# Patient Record
Sex: FEMALE | Race: WHITE | NOT HISPANIC OR LATINO | Employment: UNEMPLOYED | URBAN - METROPOLITAN AREA
[De-identification: names, ages, dates, MRNs, and addresses within clinical notes are randomized per-mention and may not be internally consistent; named-entity substitution may affect disease eponyms.]

---

## 2017-09-07 DIAGNOSIS — H92.03 EAR PAIN, BILATERAL: Primary | ICD-10-CM

## 2018-10-24 PROBLEM — M79.89 SOFT TISSUE MASS: Status: ACTIVE | Noted: 2018-10-24

## 2018-11-06 ENCOUNTER — HOSPITAL ENCOUNTER (OUTPATIENT)
Dept: RADIOLOGY | Facility: HOSPITAL | Age: 48
Discharge: HOME | End: 2018-11-06
Attending: NURSE PRACTITIONER
Payer: COMMERCIAL

## 2018-11-06 ENCOUNTER — OFFICE VISIT (OUTPATIENT)
Dept: OTOLARYNGOLOGY | Facility: CLINIC | Age: 48
End: 2018-11-06
Payer: COMMERCIAL

## 2018-11-06 VITALS
HEART RATE: 89 BPM | DIASTOLIC BLOOD PRESSURE: 84 MMHG | SYSTOLIC BLOOD PRESSURE: 134 MMHG | WEIGHT: 234.81 LBS | HEIGHT: 67 IN | BODY MASS INDEX: 36.85 KG/M2

## 2018-11-06 DIAGNOSIS — R42 DISEQUILIBRIUM: ICD-10-CM

## 2018-11-06 DIAGNOSIS — J34.89 SINUS PAIN: Primary | ICD-10-CM

## 2018-11-06 DIAGNOSIS — R51.9 SINUS HEADACHE: ICD-10-CM

## 2018-11-06 DIAGNOSIS — J34.89 SINUS PAIN: ICD-10-CM

## 2018-11-06 PROCEDURE — 70220 X-RAY EXAM OF SINUSES: CPT | Mod: 26,,, | Performed by: RADIOLOGY

## 2018-11-06 PROCEDURE — 3008F BODY MASS INDEX DOCD: CPT | Mod: CPTII,S$GLB,, | Performed by: NURSE PRACTITIONER

## 2018-11-06 PROCEDURE — 99214 OFFICE O/P EST MOD 30 MIN: CPT | Mod: S$GLB,,, | Performed by: NURSE PRACTITIONER

## 2018-11-06 PROCEDURE — 70220 X-RAY EXAM OF SINUSES: CPT | Mod: TC,FY,PO

## 2018-11-06 PROCEDURE — 99999 PR PBB SHADOW E&M-EST. PATIENT-LVL III: CPT | Mod: PBBFAC,,, | Performed by: NURSE PRACTITIONER

## 2018-11-06 RX ORDER — ERGOCALCIFEROL 1.25 MG/1
CAPSULE ORAL
Refills: 3 | COMMUNITY
Start: 2018-10-19 | End: 2021-09-01 | Stop reason: SDUPTHER

## 2018-11-06 NOTE — PROGRESS NOTES
"Subjective:       Patient ID: Siobhan Richards is a 48 y.o. female.    Chief Complaint: Headache; sinus pressure; and Dizziness (" lightheaded")    HPI   Patient states her sinuses were flared up last week, better now except for some continued pressure and headaches. She would like sinus x-rays today.   Patient's chief concern at this point is dizziness ongoing for months. When she looks up, she feels suddenly like she is going to fall over, like an overwhelming panic feeling, like she has to hurry up and grab hold of something or she will fall. Sensation is very quick and relieved by bringing her head back down to level with the floor. No spinning but feels like she is falling. She never feels this with laying back in bed or turning over in bed. She feels it with looking down while walking such as navigating stairs. She is frustrated as this makes her feel like she is an 78-year-old. She describes it as a similar feeling to riding escalator or elevator which she has always had difficulty tolerating.     Review of Systems   Constitutional: Negative.    HENT: Positive for congestion (at times), dental problem, postnasal drip (at night) and sinus pain.         Itchy nose  Frequent throat clearing   Eyes: Negative.    Respiratory: Positive for choking (at times).    Cardiovascular: Negative.    Gastrointestinal: Negative.    Musculoskeletal: Negative.    Skin: Negative.    Neurological: Positive for headaches.   Hematological: Negative.    Psychiatric/Behavioral: Negative.        Objective:      Physical Exam   Constitutional: She is oriented to person, place, and time. Vital signs are normal. She appears well-developed and well-nourished. She is cooperative. She does not appear ill. No distress.   HENT:   Head: Normocephalic and atraumatic.   Right Ear: Hearing, tympanic membrane, external ear and ear canal normal. Tympanic membrane is not erythematous. No middle ear effusion.   Left Ear: Hearing, tympanic " membrane, external ear and ear canal normal. Tympanic membrane is not erythematous.  No middle ear effusion.   Nose: Nose normal. No mucosal edema or rhinorrhea. Right sinus exhibits no maxillary sinus tenderness and no frontal sinus tenderness. Left sinus exhibits no maxillary sinus tenderness and no frontal sinus tenderness.   Mouth/Throat: Uvula is midline, oropharynx is clear and moist and mucous membranes are normal. Mucous membranes are not pale, not dry and not cyanotic. No oral lesions. No oropharyngeal exudate, posterior oropharyngeal edema or posterior oropharyngeal erythema.   Eyes: Conjunctivae, EOM and lids are normal. Pupils are equal, round, and reactive to light. Right eye exhibits no discharge. Left eye exhibits no discharge. No scleral icterus.   Neck: Trachea normal and normal range of motion. Neck supple. No tracheal deviation present. No thyroid mass and no thyromegaly present.   Cardiovascular: Normal rate.   Pulmonary/Chest: Effort normal. No stridor. No respiratory distress. She has no wheezes.   Musculoskeletal: Normal range of motion.   Lymphadenopathy:        Head (right side): No submental, no submandibular, no tonsillar, no preauricular and no posterior auricular adenopathy present.        Head (left side): No submental, no submandibular, no tonsillar, no preauricular and no posterior auricular adenopathy present.     She has no cervical adenopathy.        Right cervical: No superficial cervical and no posterior cervical adenopathy present.       Left cervical: No superficial cervical and no posterior cervical adenopathy present.   Neurological: She is alert and oriented to person, place, and time. She has normal strength. Coordination and gait normal.   Skin: Skin is warm, dry and intact. No lesion and no rash noted. She is not diaphoretic. No cyanosis. No pallor.   Psychiatric: She has a normal mood and affect. Her speech is normal and behavior is normal. Judgment and thought content  normal. Cognition and memory are normal.   Nursing note and vitals reviewed.      Assessment:     Sinus pressure/headaches -- imaging needed to rule out sinusitis    Disequilibrium, rule out vestibular etiology  Plan:     Sinus x-rays today -- will notify patient of results as soon as available    Schedule DHP/VNG for vestibular system diagnostic testing.   If sinuses are negative and if vestibular testing is negative, will need referral to neurology.

## 2018-11-06 NOTE — PATIENT INSTRUCTIONS
Ponaris Nasal Emollient is used for the relief of: nasal congestion due to colds, nasal irritation, allergy exacerbations, nasal crusting. Specifically prepared iodized organic oils of pine, eucalyptus, peppermint, cajeput, and cottonseed. To order Ponaris: ask your pharmacist to order it for you or we carry it in our pharmacy downstairs on the first floor.     Dr. Jesusita Vergara for pain, TMJ, headaches.

## 2018-11-09 ENCOUNTER — CLINICAL SUPPORT (OUTPATIENT)
Dept: AUDIOLOGY | Facility: CLINIC | Age: 48
End: 2018-11-09
Payer: COMMERCIAL

## 2018-11-09 DIAGNOSIS — H91.90 HEARING DIFFICULTY, UNSPECIFIED LATERALITY: Primary | ICD-10-CM

## 2018-11-09 DIAGNOSIS — R42 DIZZINESS: Primary | ICD-10-CM

## 2018-11-09 PROCEDURE — 92557 COMPREHENSIVE HEARING TEST: CPT | Mod: S$GLB,,, | Performed by: AUDIOLOGIST-HEARING AID FITTER

## 2018-11-09 PROCEDURE — 92567 TYMPANOMETRY: CPT | Mod: S$GLB,,, | Performed by: AUDIOLOGIST-HEARING AID FITTER

## 2018-11-09 NOTE — PROGRESS NOTES
Siobhan Richards was seen 11/09/2018 for an audiological evaluation. Patient complains of dizziness. 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    Results reveal normal hearing from 250-8000Hz bilaterally.    Speech Reception Thresholds were  5 dBHL for the right ear and 5 dBHL for the left ear.    Word recognition scores were excellent bilaterally.   Tympanograms were Type A for the right ear and Type A for the left ear.    Audiogram results were reviewed in detail with patient and all questions were answered. Results will be reviewed by ENT at the completion of this note. Recommend hearing testing if problems arise and hearing protection when around loud noises.

## 2018-11-15 ENCOUNTER — CLINICAL SUPPORT (OUTPATIENT)
Dept: AUDIOLOGY | Facility: CLINIC | Age: 48
End: 2018-11-15
Payer: COMMERCIAL

## 2018-11-15 DIAGNOSIS — H81.4 CENTRAL VESTIBULAR VERTIGO: Primary | ICD-10-CM

## 2018-11-15 PROCEDURE — 92540 BASIC VESTIBULAR EVALUATION: CPT | Mod: S$GLB,,, | Performed by: AUDIOLOGIST

## 2018-11-15 PROCEDURE — 92537 CALORIC VSTBLR TEST W/REC: CPT | Mod: S$GLB,,, | Performed by: AUDIOLOGIST

## 2018-11-15 NOTE — PROGRESS NOTES
Referring provider: Macey Drake NP    Siobhan Richards was seen 11/15/2018 for Videonystagmography (VNG) testing.      Dizzy symptoms mostly consist of lightheadedness and disorientation.  It is worse when the lights are out or dim.  She feels this especially when she bends down and then comes up so she has to hold on to something to steady herself. This feeling lasts a few seconds and is better when her head is level to the floor.  She feels like she is riding in an elevator or on an escalator even when she is standing still when this feeling occurs, almost like she is falling or in motion.  She denies true vertigo and does not get dizzy with laying down in bed or rolling over in bed.     She does have a history of migraine headaches and has a Vitamin D deficiency.  Her migraine triggers are smells, weather changes, hormone changes and some foods (i.e., [place] semi-sweet chocolate, etc.) and alcohol.  She takes Topamax at times or Advil, Excedrin or other OTC medications. she also uses peppermint to help. She does not currently see a neurologist for her migraines. No vestibular suppressants, alcohol or caffeine within the past 24 hours. Pt had a normal audiogram on 11/9/18 and has bilateral tinnitus.      VNG Results:   Note: Vertical nystagmus observed during calibration with vision up stimulus  Oculomotor function tests (sinusoidal tracking, saccade and OPKs) were within normal limits and symmetric.   NOTE: Significant catch-up saccades and square wave jerk nystagmus were observed in sinusoidal tracking and saccades test which are abnormal and indicative of central involvement.   Spontaneous nystagmus test revealed square wave jerk nystagmus.   Gaze nystagmus was absent.   Head-shake test revealed square wave jerk nystagmus.   Abdulaziz-Hallpike Left was negative for BPPV.  Abdulaziz-Hallpike Right was negative for BPPV.  Static Positional nystagmus was absent.   Supine: WNL   Head Right: 2 d/s RB nystagmus (<6  d/s is clinically insignificant)    Head Left: WNL   Body Right: WNL    Body Left: WNL   Bi-thermal caloric irrigations revealed a 18% caloric weakness in the left ear, which is within normal limits, and 2% directional preponderance to the left, which is within normal limits.  Fixation suppression following caloric irrigations were normal.   RC: 12 d/s   RW: 17 d/s     d/s   LW: 13 d/s     Impressions:   Normal peripheral function with indication of central involvement.  Abnormal findings were:    1. Square wave jerk nystagmus and significant catch-up saccades during oculomotor testing   2. Vertical nystagmus observed during calibration with vision up stimulus    Recommendations:   1. ENT review of results   2. Neurology consult    Tracings will be scanned into computer.

## 2018-11-16 DIAGNOSIS — H81.4 VERTIGO OF CENTRAL ORIGIN, UNSPECIFIED LATERALITY: Primary | ICD-10-CM

## 2019-02-28 ENCOUNTER — TELEPHONE (OUTPATIENT)
Dept: NEUROLOGY | Facility: CLINIC | Age: 49
End: 2019-02-28

## 2019-02-28 NOTE — TELEPHONE ENCOUNTER
----- Message from Melania Wilcox sent at 2/28/2019  2:28 PM CST -----  Contact: self  Type: Needs Medical Advice    Who Called:  self  Symptoms (please be specific):    How long has patient had these symptoms:    Pharmacy name and phone #:    Best Call Back Number: [phone]  Additional Information: patient would like to know the cost of the Botox for her appointment tomorrow. Please call patient. Thanks!

## 2019-02-28 NOTE — TELEPHONE ENCOUNTER
Returned call and spoke with patient. Patient would like to know how much is the cost of her consult tomorrow. Patient has insurance. Informed patient that I am unable to tell her the cost. Patient would need to speak with the billing department and/or patient access representative. Patient verbalized understanding, but declined number.

## 2019-03-01 ENCOUNTER — OFFICE VISIT (OUTPATIENT)
Dept: NEUROLOGY | Facility: CLINIC | Age: 49
End: 2019-03-01
Payer: COMMERCIAL

## 2019-03-01 ENCOUNTER — HOSPITAL ENCOUNTER (OUTPATIENT)
Dept: RADIOLOGY | Facility: HOSPITAL | Age: 49
Discharge: HOME | End: 2019-03-01
Attending: PSYCHIATRY & NEUROLOGY
Payer: COMMERCIAL

## 2019-03-01 VITALS
BODY MASS INDEX: 35.88 KG/M2 | HEART RATE: 78 BPM | SYSTOLIC BLOOD PRESSURE: 124 MMHG | WEIGHT: 228.63 LBS | DIASTOLIC BLOOD PRESSURE: 79 MMHG | RESPIRATION RATE: 16 BRPM | HEIGHT: 67 IN

## 2019-03-01 DIAGNOSIS — M54.2 CERVICALGIA: ICD-10-CM

## 2019-03-01 DIAGNOSIS — G43.719 INTRACTABLE CHRONIC MIGRAINE WITHOUT AURA AND WITHOUT STATUS MIGRAINOSUS: ICD-10-CM

## 2019-03-01 PROCEDURE — 3008F BODY MASS INDEX DOCD: CPT | Mod: CPTII,S$GLB,, | Performed by: PSYCHIATRY & NEUROLOGY

## 2019-03-01 PROCEDURE — 72052 X-RAY EXAM NECK SPINE 6/>VWS: CPT | Mod: 26,,, | Performed by: RADIOLOGY

## 2019-03-01 PROCEDURE — 99205 OFFICE O/P NEW HI 60 MIN: CPT | Mod: S$GLB,,, | Performed by: PSYCHIATRY & NEUROLOGY

## 2019-03-01 PROCEDURE — 99999 PR PBB SHADOW E&M-EST. PATIENT-LVL III: ICD-10-PCS | Mod: PBBFAC,,, | Performed by: PSYCHIATRY & NEUROLOGY

## 2019-03-01 PROCEDURE — 99205 PR OFFICE/OUTPT VISIT, NEW, LEVL V, 60-74 MIN: ICD-10-PCS | Mod: S$GLB,,, | Performed by: PSYCHIATRY & NEUROLOGY

## 2019-03-01 PROCEDURE — 99999 PR PBB SHADOW E&M-EST. PATIENT-LVL III: CPT | Mod: PBBFAC,,, | Performed by: PSYCHIATRY & NEUROLOGY

## 2019-03-01 PROCEDURE — 72052 X-RAY EXAM NECK SPINE 6/>VWS: CPT | Mod: TC,FY,PO

## 2019-03-01 PROCEDURE — 72052 XR CERVICAL SPINE 5 VIEW WITH FLEX AND EXT: ICD-10-PCS | Mod: 26,,, | Performed by: RADIOLOGY

## 2019-03-01 PROCEDURE — 3008F PR BODY MASS INDEX (BMI) DOCUMENTED: ICD-10-PCS | Mod: CPTII,S$GLB,, | Performed by: PSYCHIATRY & NEUROLOGY

## 2019-03-01 RX ORDER — PROCHLORPERAZINE MALEATE 10 MG
10 TABLET ORAL EVERY 6 HOURS PRN
Qty: 60 TABLET | Refills: 11 | Status: SHIPPED | OUTPATIENT
Start: 2019-03-01 | End: 2021-03-23

## 2019-03-01 RX ORDER — SUMATRIPTAN SUCCINATE 4 MG/.5ML
4 INJECTION, SOLUTION SUBCUTANEOUS
Qty: 2 ML | Refills: 11 | Status: SHIPPED | OUTPATIENT
Start: 2019-03-01 | End: 2021-03-23

## 2019-03-01 RX ORDER — FUROSEMIDE 20 MG/1
TABLET ORAL
Refills: 12 | COMMUNITY
Start: 2019-01-21

## 2019-03-01 RX ORDER — VENLAFAXINE HYDROCHLORIDE 150 MG/1
150 CAPSULE, EXTENDED RELEASE ORAL DAILY
Qty: 30 CAPSULE | Refills: 11 | Status: SHIPPED | OUTPATIENT
Start: 2019-03-01 | End: 2021-03-23

## 2019-03-01 RX ORDER — RIZATRIPTAN BENZOATE 10 MG/1
TABLET ORAL
Qty: 18 TABLET | Refills: 11 | Status: SHIPPED | OUTPATIENT
Start: 2019-03-01 | End: 2021-03-23

## 2019-03-01 RX ORDER — VENLAFAXINE HYDROCHLORIDE 37.5 MG/1
CAPSULE, EXTENDED RELEASE ORAL
Qty: 21 CAPSULE | Refills: 0 | Status: SHIPPED | OUTPATIENT
Start: 2019-03-01 | End: 2021-03-23

## 2019-03-01 NOTE — PATIENT INSTRUCTIONS
TESTING:  -- cervical x ray     REFERRALS:  -- none     PREVENTION (use daily regardless of headache):  -- begin venlafaxine XR 37.5mg in the AM and raise according to the chart   -- next line would be Emgality (monthly injection) or Botox     AS-NEEDED TREATMENT (use total no more than 10 days per month unless otherwise stated):  -- for very fast onset migraines, take Imitrex shot, may repeat once in 1 hour  -- for all other headaches including sinus symptoms, take rizatriptan (maxalt) at onset, may repeat every 2 hours for max 3 in one day   -- may not use imitrex and maxalt in the same 24 hour period (if scalp tender, then take it with aleve or ibuprofen)  -- for headaches you are unsure on, take compazine (nausea pill that works for headaches) - may use daily       ------------------------------------------------------------------------------------------------------------------------------------------------------------------------------      Venlafaxine Long-Acting (Effexor XR) for Headaches    Venlafaxine is an antidepressant medication of the SNRI family (as opposed to the more commonly known SSRIs). It is a useful migraine preventive, although it is not FDA-approved for this purpose (it is approved for anxiety, depression, panic, and social phobia). The drug may also be helpful in certain other painful conditions. It is available as an inexpensive short-acting generic, but we do not recommend this formulation, because missing a single dose can sometimes cause unpleasant withdrawal symptoms. We therefore prescribe the long-acting capsules or tablets.    The drug should be taken in the morning, to reduce the chance of insomnia.  It is often better tolerated than the older tricyclic antidepressants (TCAs, including nortriptyline, amitriptyline, imipramine, and doxepin), without much in the way of weight-gain or sedation. Severe side effects are uncommon, but milder and more frequent ones  include:   Constipation   Dry mouth   Loss of appetite / weight loss / GI upset   Insomnia   Tremor   Increased blood pressure   Sweating and hot flashes   Sexual side effects   Yawning   Teeth grinding   Dizziness   It can sometimes cause a nagging headache   It can sometimes worsen anxiety   Suicidal thoughts (rare; a problem with all antidepressants)    There is a theoretical interaction of venlafaxine (and most other antidepressants) with the prescription migraine attack medications known as triptans. This interaction--called the serotonin syndrome--occurs extremely rarely, if at all (see separate handout) and ranges from barely noticeable to quite serious. Symptoms include fever, racing heart, tremulousness, sweating, twitching, agitation and confusion.     The drug is increased gradually, with most patients eventually reaching 150mg (some do well on 75mg, others require higher doses, up to 300mg). If you feel that 150mg is too strong, contact us to drop back down to 75mg.    Venlafaxine Long-Acting (Effexor XR)   Week # Morning Dose   1 37.5mg   2 37.5mg + 37.5mg = 75mg   3 and after 150mg     Like all headache prevention drugs, once a good dose is reached it can take several weeks before an effect is noticed. Benefits can increase over time. Stick with it as long as the side effects are tolerable.    If you need to stop the medicine and you have been taking 150mg or more we typically recommend gradually decreasing the drug rather than stopping cold turkey to avoid common and unpleasant withdrawal symptoms, including zapping feelings and dizziness.

## 2019-03-01 NOTE — PROGRESS NOTES
"Date of service: 3/1/2019  Referring provider: Macey Drake    Subjective:      Chief complaint: Migraine       Patient ID: Siobhan Richards is a 48 y.o. lady referred for the evaluation of migraine     History of Present Illness    ORIGINAL HEADACHE HISTORY - 3/1/19  Age at onset and course over time: headaches since childhood, worse with puberty, ovulation, and childbirth. She has 3 different headache types. One is triggered by smell and is acute, unilateral, with nausea and vomiting, resolves after a couple of hours (very quick progression). 2nd is one that occurs upon waking, from occipital to eye, lastrs one day, photo and nausea and vomiting but not always. She associated this with a lipoma that she had removed. This occurs 2-3 times per month. Third headache is a "sinus headache" that occurs with weather changes, sometimes escalates to the 2nd type, this is the most frequent type. She was referred by ENT. She also has dizziness but this is intermittent. She feels off balance. Gets dizzy when looking up 9 (in [employer]), sec to minutes. No other types of dizziness.     She is pain free 10 days a month    Diagnosed with erosive gastritis 12/18/18     Location: unilateral eye, neck, left more than right   Quality:  [x] pressure [] tight [x] throbbing [] sharp [] stabbing   Severity: 1 to 10  Duration: hours to days, lately longer   Frequency: 15 to 20 headache days per month and rapid 2-3 escalations per month    Headaches awaken at night?: 2 per month   Worst time of day:  Depends on the headache   Associated with: [x] photophobia [x]  phonophobia [x] osmophobia [x] blurred vision  [x] double vision [] loss of appetite [x] nausea [x] vomiting [x] dizziness [x] vertigo  [] tinnitus [] irritability [x] sinus pressure [] problems with concentration   [x] neck tightness   Alleviated by:  [x] sleep [x] darkness [x] massage [] heat [] ice [x] medication  Exacerbated by:  [x] fatigue [x] light [] noise [x] " smells [] coughing [] sneezing  [] bending over [] ovulation [] menses [] alcohol [] change in weather []  stress  Ipsilateral autonomic: [] nasal congestion [] lacrimation [] ptosis [] injection [] edema [] foreign body sensation [] ear fullness   ICP:   Sleep habits: falls asleep OK but has a hard time staying asleep (teenager related)   Caffeine intake: 1-2   Gyn status (if female):     Current acute treatment - previously 3 days per week; now approximately once a week   -- advil   -- excedrin  -- sudafed  -- massager     Current prevention:  -- topiramate 50mg daily since 2016 - has lead to some improvement (100mg made her foggy), paresthesias   (vit D)     Previously tried/failed acute treatment - would take too late   -- sumatriptan  -- zomig     Previously tried/failed preventative treatment:  -- none     Review of patient's allergies indicates:  No Known Allergies  Current Outpatient Medications   Medication Sig Dispense Refill    aspirin-acetaminophen-caffeine 250-250-65 mg (EXCEDRIN MIGRAINE) 250-250-65 mg per tablet Excedrin Migraine   prn      furosemide (LASIX) 20 MG tablet TAKE 1 TABLET ONCE A DAY AS NEEDED SWELLING  12    pantoprazole (PROTONIX) 40 MG tablet Take 40 mg by mouth once daily.  11    phentermine (ADIPEX-P) 37.5 mg tablet Take 37.5 mg by mouth once daily.   0    pravastatin (PRAVACHOL) 20 MG tablet Take 40 mg by mouth once daily.   3    topiramate (TOPAMAX) 50 MG tablet Take 50 mg by mouth once daily.  3    VITAMIN D2 50,000 unit capsule TAKE ONE CAPSULE BY MOUTH ONE TIME PER WEEK  3    prochlorperazine (COMPAZINE) 10 MG tablet Take 1 tablet (10 mg total) by mouth every 6 (six) hours as needed (migraine or nausea). 60 tablet 11    rizatriptan (MAXALT) 10 MG tablet 1 tab PO PRN migraine. May repeat every 2 hours for max 3 tabs in 24 hours. Use no more than 10 days per month. 18 tablet 11    SUMAtriptan succinate (IMITREX) 4 mg/0.5 mL PnIj Inject 4 mg into the skin every hour as  needed (severe migraine.). No more than 2 in one day, 2 days per week 2 mL 11    venlafaxine (EFFEXOR-XR) 150 MG Cp24 Take 1 capsule (150 mg total) by mouth once daily. 30 capsule 11    venlafaxine (EFFEXOR-XR) 37.5 MG 24 hr capsule 1 tab PO in AM x 1 week, then 2 tab PO in AM x 1 week 21 capsule 0     No current facility-administered medications for this visit.        Past Medical History  Past Medical History:   Diagnosis Date    Allergy     Arthritis     Dizziness     Headache(784.0)     Hyperlipidemia     Obesity     PONV (postoperative nausea and vomiting)     Rash     Sinusitis     Snoring        Past Surgical History  Past Surgical History:   Procedure Laterality Date    BREAST BIOPSY Right 2009    core bx.    BREAST BIOPSY Right 2009    removal of duct     SECTION      CHOLECYSTECTOMY      EXCISION, LIPOMA - Left Scapula Left 10/24/2018    Performed by Smith [facility]yon [facility]       Family History  Family History   Problem Relation Age of Onset    Heart disease Mother     Lung cancer Father     Thyroid cancer Sister     Heart attack Brother        Social History  Social History     Socioeconomic History    Marital status:      Spouse name: Not on file    Number of children: Not on file    Years of education: Not on file    Highest education level: Not on file   Social Needs    Financial resource strain: Not on file    Food insecurity - worry: Not on file    Food insecurity - inability: Not on file    Transportation needs - medical: Not on file    Transportation needs - non-medical: Not on file   Occupational History    Not on file   Tobacco Use    Smoking status: Former Smoker    Smokeless tobacco: Never Used   Substance and Sexual Activity    Alcohol use: No    Drug use: No    Sexual activity: Not on file   Other Topics Concern    Not on file   Social History Narrative    Not on file        Review of Systems  14-point review of systems as  follows:   No check darren indicates NEGATIVE response   Constitutional: [] weight loss, [] change to appetite   Eyes: [] change in vision, [] double vision   Ears, nose, mouth, throat: [] frequent nose bleeds, [x] ringing in the ears   Respiratory: [] cough, [] wheezing   Cardiovascular: [] chest pain, [] palpitations   Gastrointestinal: [] jaundice, [] nausea/vomiting   Genitourinary: [] incontinence, [] burning with urination   Hematologic/lymphatic: [] easy bruising/bleeding, [] night sweats   Neurological: [x] numbness, [x] weakness   Endocrine: [x] fatigue, [] heat/cold intolerance   Allergy/Immunologic: [] fevers, [] chills   Musculoskeletal: [] muscle pain, [x] joint pain   Psychiatric: [] thoughts of harming self/others, [] depression   Integumentary: [] rashes, [] sores that do not heal     Objective:        Vitals:    03/01/19 0801   BP: 124/79   Pulse: 78   Resp: 16     Body mass index is 35.81 kg/m².    Constitutional: appears in no acute distress, well-developed, well-nourished     Eyes: normal conjunctiva, PERRLA, optic discs are flat and sharp bilaterally     Ears, nose, mouth, throat: external appearance of ears and nose normal, hearing intact     Cardiovascular: regular rate and rhythm, no murmurs appreciated    Respiratory: unlabored respirations, breath sounds normal bilaterally    Gastrointestinal: no visible abdominal masses, no guarding, no visible hernia    Musculoskeletal: normal tone in all four extremities. No atrophy. No abnormal movements. No pronator drift. No orbit. Symmetric finger tapping. Normal gait and station. Digits and nails normal.      Spine:   CERVICAL SPINE:  ROM: normal   MUSCLE SPASM: mild bilateral   FACET LOADING: bilateral   SPURLING: no  PERRI / LEIGHTON tender: no     Psychiatric: normal judgment and insight. Oriented to person, place, and time.     Neurologic:   Cortical functions: recent and remote memory intact, normal attention span and concentration, speech fluent,  adequate fund of knowledge   Cranial nerves: visual fields full, PERRLA, EOMI, symmetric facial strength, hearing intact, palate elevates symmetrically, shoulder shrug 5/5, tongue protrudes midline   Reflexes: 2+ in the upper and lower extremities, no Jorge  Sensation: intact to temperature throughout   Coordination: normal finger to nose    Data Review:     I have personally reviewed the referring provider's notes, labs, & imaging made available to me today.      RADIOLOGY STUDIES:   I have personally reviewed the pertinent images performed.     Patient has a history of normal brain MRI    No results found for this or any previous visit.    Lab Results   Component Value Date    HGBA1C 5.1 10/24/2018       No results found for: WBC, HGB, HCT, MCV, PLT    No results found for: TSH        Assessment & Plan:       Problem List Items Addressed This Visit        Neuro    Intractable chronic migraine without aura and without status migrainosus    Overview     Strong family history of migraine onset in childhood with expected flares during typical hormonal milestones.  Gradually progressive to chronic migraine over time.  Very typical symptoms of weather sensitivity, osmophobia, nausea, light and noise sensitivity.  We discussed that all three headache types including the sinus headaches are actually migrainous in nature.  There was previously a tendency towards medication overuse with nonsteroidals but due to the development of erosive gastritis she has stopped overuse.  Triptans never worked because they were taken too late.    At this point she is only on topiramate prevention which is largely ineffective.  Higher doses were not tolerated due to mental fog.  I advised either beginning venlafaxine which is the only weight neutral option we have left versus a CGRP antagonist.  At this point we elected to start venlafaxine.  If this is failed week and then pursue Emgality or Botox depending on her choice.    As  occasionally she will have a headache with very rapid escalation upon waking up we will need to do injectable Imitrex.  On all other moderate days I would like her to treat with rizatriptan 1st to have a migraine specific therapy.  I have also provided Compazine to take early when she is not sure if migraines will escalate or not.         Relevant Medications    venlafaxine (EFFEXOR-XR) 37.5 MG 24 hr capsule    venlafaxine (EFFEXOR-XR) 150 MG Cp24    rizatriptan (MAXALT) 10 MG tablet    prochlorperazine (COMPAZINE) 10 MG tablet    SUMAtriptan succinate (IMITREX) 4 mg/0.5 mL PnIj       Orthopedic    Cervicalgia    Overview     Left more than right axial neck pain especially overnight.  Facet loading on exam.  Obtain x-ray.  I think bulk of her neck pain will improve if her migraines are better controlled but we may need to consider trigger points or CMBB in the future         Relevant Orders    X-Ray Cervical Spine 5 View W Flex Extxt              TESTING:  -- cervical x ray     REFERRALS:  -- none     PREVENTION (use daily regardless of headache):  -- begin venlafaxine XR 37.5mg in the AM and raise according to the chart   -- next line would be Emgality (monthly injection) or Botox     AS-NEEDED TREATMENT (use total no more than 10 days per month unless otherwise stated):  -- for very fast onset migraines, take Imitrex shot, may repeat once in 1 hour  -- for all other headaches including sinus symptoms, take rizatriptan (maxalt) at onset, may repeat every 2 hours for max 3 in one day   -- may not use imitrex and maxalt in the same 24 hour period (if scalp tender, then take it with aleve or ibuprofen)  -- for headaches you are unsure on, take compazine (nausea pill that works for headaches) - may use daily     Follow-up in about 2 months (around 5/1/2019).     A total of 60 minutes were spent face to face with the patient and more than half of this time was spent coordinating care and/or counseling.       Jesusita CORMIER  MD Urs[place]

## 2019-03-01 NOTE — LETTER
March 1, 2019      Macey Drake, ALEJANDRA  [address] [facility] [address] [place] [address]           [facility] [place]  [address] [facility] [address] [place] [address]  Phone: [phone]  Fax: [phone]          Patient: Siobhan Richards   MR Number: [id number]   YOB: 1970   Date of Visit: 3/1/2019       Dear Macey Drake:    Thank you for referring Siobhan Richards to me for evaluation. Attached you will find relevant portions of my assessment and plan of care.    If you have questions, please do not hesitate to call me. I look forward to following Siobhan Richards along with you.    Sincerely,    Jesusita Vergara MD    Enclosure  CC:  No Recipients    If you would like to receive this communication electronically, please contact [email or web address] or [phone] to request more information on [employer] Link access.    For providers and/or their staff who would like to refer a patient to [facility], please contact us through our one-stop-shop provider referral line, [facility], at [phone].    If you feel you have received this communication in error or would no longer like to receive these types of communications, please e-mail [email or web address]

## 2019-04-23 PROBLEM — S82.851A CLOSED TRIMALLEOLAR FRACTURE OF RIGHT ANKLE: Status: ACTIVE | Noted: 2019-04-23

## 2019-05-08 ENCOUNTER — TELEPHONE (OUTPATIENT)
Dept: NEUROLOGY | Facility: CLINIC | Age: 49
End: 2019-05-08

## 2019-05-08 NOTE — TELEPHONE ENCOUNTER
----- Message from Carole Malagon sent at 5/8/2019  2:30 PM CDT -----  Contact: Patient  Type: Needs Medical Advice    Who Called:  Patient  Symptoms (please be specific):  na  How long has patient had these symptoms:  harman  Pharmacy name and phone #:  [facility]  Best Call Back Number: [phone] (home)    Additional Information: Patient states that she cancelled her appointment on 5/7/19, states that she would like to have the missed appointment removed and replaced with cancelled. Requesting a call to discuss, would like to verify that there is no additional charge. Please call to advise, thank you!

## 2019-05-08 NOTE — TELEPHONE ENCOUNTER
Returned call and spoke with patient. All questions and concerns addressed. Patient verbalized understanding and will call back once her foot in healed.

## 2019-10-03 ENCOUNTER — TELEPHONE (OUTPATIENT)
Dept: OTOLARYNGOLOGY | Facility: CLINIC | Age: 49
End: 2019-10-03

## 2019-10-03 NOTE — TELEPHONE ENCOUNTER
----- Message from Helena Chapman sent at 10/3/2019 10:53 AM CDT -----  Contact: [phone]  Patient requesting same day appointment due to left ear pain.   Please call patient at [phone].   Thanks!

## 2019-10-03 NOTE — TELEPHONE ENCOUNTER
S/w pt that states she is having ear and jaw pain, would like to be seen today. Advised pt Macey has nothing available until 10/14/19. Booked appt, placed pt on wait list. Pt confirmed.

## 2019-11-13 LAB — NONINV COLON CA DNA+OCC BLD SCRN STL QL: NEGATIVE

## 2020-11-16 LAB
HUMAN PAPILLOMAVIRUS (HPV): NORMAL
PAP SMEAR: NORMAL

## 2021-03-23 ENCOUNTER — OFFICE VISIT (OUTPATIENT)
Dept: FAMILY MEDICINE | Facility: CLINIC | Age: 51
End: 2021-03-23
Payer: COMMERCIAL

## 2021-03-23 ENCOUNTER — TELEPHONE (OUTPATIENT)
Dept: FAMILY MEDICINE | Facility: CLINIC | Age: 51
End: 2021-03-23

## 2021-03-23 VITALS
OXYGEN SATURATION: 96 % | WEIGHT: 240 LBS | HEART RATE: 90 BPM | BODY MASS INDEX: 37.67 KG/M2 | HEIGHT: 67 IN | SYSTOLIC BLOOD PRESSURE: 118 MMHG | DIASTOLIC BLOOD PRESSURE: 84 MMHG | TEMPERATURE: 98 F

## 2021-03-23 DIAGNOSIS — Z00.00 WELLNESS EXAMINATION: ICD-10-CM

## 2021-03-23 DIAGNOSIS — E78.2 MIXED HYPERLIPIDEMIA: Primary | ICD-10-CM

## 2021-03-23 DIAGNOSIS — M83.9 VITAMIN D DEFICIENT OSTEOMALACIA: ICD-10-CM

## 2021-03-23 DIAGNOSIS — E66.9 OBESITY (BMI 30-39.9): ICD-10-CM

## 2021-03-23 DIAGNOSIS — E88.810 METABOLIC SYNDROME: ICD-10-CM

## 2021-03-23 DIAGNOSIS — F98.8 ADD (ATTENTION DEFICIT DISORDER) WITHOUT HYPERACTIVITY: ICD-10-CM

## 2021-03-23 DIAGNOSIS — G43.909 MIGRAINE WITHOUT STATUS MIGRAINOSUS, NOT INTRACTABLE, UNSPECIFIED MIGRAINE TYPE: ICD-10-CM

## 2021-03-23 PROCEDURE — 99999 PR PBB SHADOW E&M-EST. PATIENT-LVL III: ICD-10-PCS | Mod: PBBFAC,,, | Performed by: INTERNAL MEDICINE

## 2021-03-23 PROCEDURE — 99999 PR PBB SHADOW E&M-EST. PATIENT-LVL III: CPT | Mod: PBBFAC,,, | Performed by: INTERNAL MEDICINE

## 2021-03-23 PROCEDURE — 99214 PR OFFICE/OUTPT VISIT, EST, LEVL IV, 30-39 MIN: ICD-10-PCS | Mod: S$GLB,,, | Performed by: INTERNAL MEDICINE

## 2021-03-23 PROCEDURE — 99214 OFFICE O/P EST MOD 30 MIN: CPT | Mod: S$GLB,,, | Performed by: INTERNAL MEDICINE

## 2021-03-23 PROCEDURE — 3008F BODY MASS INDEX DOCD: CPT | Mod: CPTII,S$GLB,, | Performed by: INTERNAL MEDICINE

## 2021-03-23 PROCEDURE — 3008F PR BODY MASS INDEX (BMI) DOCUMENTED: ICD-10-PCS | Mod: CPTII,S$GLB,, | Performed by: INTERNAL MEDICINE

## 2021-03-23 RX ORDER — TOPIRAMATE 50 MG/1
50 TABLET, FILM COATED ORAL DAILY
Qty: 90 TABLET | Refills: 2 | Status: SHIPPED | OUTPATIENT
Start: 2021-03-23 | End: 2021-06-17 | Stop reason: SDUPTHER

## 2021-03-23 RX ORDER — CYCLOBENZAPRINE HCL 10 MG
10 TABLET ORAL 3 TIMES DAILY PRN
Qty: 90 TABLET | Refills: 0 | Status: SHIPPED | OUTPATIENT
Start: 2021-03-23 | End: 2021-04-02

## 2021-03-23 RX ORDER — PHENTERMINE HYDROCHLORIDE 37.5 MG/1
37.5 TABLET ORAL DAILY
Qty: 30 TABLET | Refills: 0 | Status: SHIPPED | OUTPATIENT
Start: 2021-03-23 | End: 2021-06-17 | Stop reason: SDUPTHER

## 2021-03-23 RX ORDER — ACETAMINOPHEN 160 MG/5ML
SUSPENSION, ORAL (FINAL DOSE FORM) ORAL
COMMUNITY
Start: 2020-12-01

## 2021-03-23 RX ORDER — PANTOPRAZOLE SODIUM 40 MG/1
40 TABLET, DELAYED RELEASE ORAL DAILY
Qty: 90 TABLET | Refills: 3 | Status: SHIPPED | OUTPATIENT
Start: 2021-03-23 | End: 2022-06-26

## 2021-03-30 DIAGNOSIS — Z12.11 COLON CANCER SCREENING: ICD-10-CM

## 2021-04-06 ENCOUNTER — PATIENT MESSAGE (OUTPATIENT)
Dept: ADMINISTRATIVE | Facility: HOSPITAL | Age: 51
End: 2021-04-06

## 2021-04-16 ENCOUNTER — PATIENT OUTREACH (OUTPATIENT)
Dept: ADMINISTRATIVE | Facility: HOSPITAL | Age: 51
End: 2021-04-16

## 2021-04-16 ENCOUNTER — TELEPHONE (OUTPATIENT)
Dept: FAMILY MEDICINE | Facility: CLINIC | Age: 51
End: 2021-04-16

## 2021-04-16 DIAGNOSIS — E78.2 MIXED HYPERLIPIDEMIA: ICD-10-CM

## 2021-04-16 DIAGNOSIS — M83.9 VITAMIN D DEFICIENT OSTEOMALACIA: Primary | ICD-10-CM

## 2021-04-16 DIAGNOSIS — Z00.00 WELLNESS EXAMINATION: ICD-10-CM

## 2021-05-06 ENCOUNTER — PATIENT MESSAGE (OUTPATIENT)
Dept: RESEARCH | Facility: HOSPITAL | Age: 51
End: 2021-05-06

## 2021-06-17 ENCOUNTER — OFFICE VISIT (OUTPATIENT)
Dept: FAMILY MEDICINE | Facility: CLINIC | Age: 51
End: 2021-06-17
Payer: COMMERCIAL

## 2021-06-17 VITALS
DIASTOLIC BLOOD PRESSURE: 80 MMHG | WEIGHT: 238.13 LBS | HEIGHT: 67 IN | BODY MASS INDEX: 37.37 KG/M2 | SYSTOLIC BLOOD PRESSURE: 128 MMHG

## 2021-06-17 DIAGNOSIS — E78.2 MIXED HYPERLIPIDEMIA: Primary | ICD-10-CM

## 2021-06-17 DIAGNOSIS — M83.9 VITAMIN D DEFICIENT OSTEOMALACIA: ICD-10-CM

## 2021-06-17 DIAGNOSIS — R73.02 GLUCOSE INTOLERANCE (IMPAIRED GLUCOSE TOLERANCE): ICD-10-CM

## 2021-06-17 DIAGNOSIS — G43.909 MIGRAINE WITHOUT STATUS MIGRAINOSUS, NOT INTRACTABLE, UNSPECIFIED MIGRAINE TYPE: ICD-10-CM

## 2021-06-17 DIAGNOSIS — E88.810 METABOLIC SYNDROME: ICD-10-CM

## 2021-06-17 PROCEDURE — 99214 OFFICE O/P EST MOD 30 MIN: CPT | Mod: S$GLB,,, | Performed by: INTERNAL MEDICINE

## 2021-06-17 PROCEDURE — 3008F PR BODY MASS INDEX (BMI) DOCUMENTED: ICD-10-PCS | Mod: CPTII,S$GLB,, | Performed by: INTERNAL MEDICINE

## 2021-06-17 PROCEDURE — 3008F BODY MASS INDEX DOCD: CPT | Mod: CPTII,S$GLB,, | Performed by: INTERNAL MEDICINE

## 2021-06-17 PROCEDURE — 1126F AMNT PAIN NOTED NONE PRSNT: CPT | Mod: S$GLB,,, | Performed by: INTERNAL MEDICINE

## 2021-06-17 PROCEDURE — 1126F PR PAIN SEVERITY QUANTIFIED, NO PAIN PRESENT: ICD-10-PCS | Mod: S$GLB,,, | Performed by: INTERNAL MEDICINE

## 2021-06-17 PROCEDURE — 99999 PR PBB SHADOW E&M-EST. PATIENT-LVL III: ICD-10-PCS | Mod: PBBFAC,,, | Performed by: INTERNAL MEDICINE

## 2021-06-17 PROCEDURE — 99999 PR PBB SHADOW E&M-EST. PATIENT-LVL III: CPT | Mod: PBBFAC,,, | Performed by: INTERNAL MEDICINE

## 2021-06-17 PROCEDURE — 99214 PR OFFICE/OUTPT VISIT, EST, LEVL IV, 30-39 MIN: ICD-10-PCS | Mod: S$GLB,,, | Performed by: INTERNAL MEDICINE

## 2021-06-17 RX ORDER — TOPIRAMATE 50 MG/1
50 TABLET, FILM COATED ORAL 2 TIMES DAILY
Qty: 180 TABLET | Refills: 2 | Status: SHIPPED | OUTPATIENT
Start: 2021-06-17 | End: 2022-04-07

## 2021-06-17 RX ORDER — PHENTERMINE HYDROCHLORIDE 37.5 MG/1
37.5 TABLET ORAL DAILY
Qty: 30 TABLET | Refills: 0 | Status: SHIPPED | OUTPATIENT
Start: 2021-06-17 | End: 2021-07-15 | Stop reason: SDUPTHER

## 2021-06-17 RX ORDER — METFORMIN HYDROCHLORIDE 500 MG/1
500 TABLET, EXTENDED RELEASE ORAL
Qty: 30 TABLET | Refills: 1 | Status: SHIPPED | OUTPATIENT
Start: 2021-06-17 | End: 2021-10-28

## 2021-06-28 PROBLEM — R73.02 GLUCOSE INTOLERANCE (IMPAIRED GLUCOSE TOLERANCE): Status: ACTIVE | Noted: 2021-06-28

## 2021-07-07 ENCOUNTER — PATIENT MESSAGE (OUTPATIENT)
Dept: ADMINISTRATIVE | Facility: HOSPITAL | Age: 51
End: 2021-07-07

## 2021-07-15 ENCOUNTER — OFFICE VISIT (OUTPATIENT)
Dept: FAMILY MEDICINE | Facility: CLINIC | Age: 51
End: 2021-07-15
Payer: COMMERCIAL

## 2021-07-15 VITALS
SYSTOLIC BLOOD PRESSURE: 118 MMHG | HEIGHT: 67 IN | DIASTOLIC BLOOD PRESSURE: 74 MMHG | WEIGHT: 225.44 LBS | BODY MASS INDEX: 35.38 KG/M2

## 2021-07-15 DIAGNOSIS — E88.810 METABOLIC SYNDROME: ICD-10-CM

## 2021-07-15 DIAGNOSIS — R73.02 GLUCOSE INTOLERANCE (IMPAIRED GLUCOSE TOLERANCE): ICD-10-CM

## 2021-07-15 DIAGNOSIS — E78.2 MIXED HYPERLIPIDEMIA: ICD-10-CM

## 2021-07-15 DIAGNOSIS — I83.893 VARICOSE VEINS OF BOTH LEGS WITH EDEMA: Primary | ICD-10-CM

## 2021-07-15 DIAGNOSIS — E66.9 OBESITY (BMI 30-39.9): ICD-10-CM

## 2021-07-15 PROCEDURE — 3008F PR BODY MASS INDEX (BMI) DOCUMENTED: ICD-10-PCS | Mod: CPTII,S$GLB,, | Performed by: INTERNAL MEDICINE

## 2021-07-15 PROCEDURE — 99213 OFFICE O/P EST LOW 20 MIN: CPT | Mod: S$GLB,,, | Performed by: INTERNAL MEDICINE

## 2021-07-15 PROCEDURE — 99213 PR OFFICE/OUTPT VISIT, EST, LEVL III, 20-29 MIN: ICD-10-PCS | Mod: S$GLB,,, | Performed by: INTERNAL MEDICINE

## 2021-07-15 PROCEDURE — 99999 PR PBB SHADOW E&M-EST. PATIENT-LVL III: CPT | Mod: PBBFAC,,, | Performed by: INTERNAL MEDICINE

## 2021-07-15 PROCEDURE — 1126F AMNT PAIN NOTED NONE PRSNT: CPT | Mod: S$GLB,,, | Performed by: INTERNAL MEDICINE

## 2021-07-15 PROCEDURE — 1126F PR PAIN SEVERITY QUANTIFIED, NO PAIN PRESENT: ICD-10-PCS | Mod: S$GLB,,, | Performed by: INTERNAL MEDICINE

## 2021-07-15 PROCEDURE — 99999 PR PBB SHADOW E&M-EST. PATIENT-LVL III: ICD-10-PCS | Mod: PBBFAC,,, | Performed by: INTERNAL MEDICINE

## 2021-07-15 PROCEDURE — 3008F BODY MASS INDEX DOCD: CPT | Mod: CPTII,S$GLB,, | Performed by: INTERNAL MEDICINE

## 2021-07-15 RX ORDER — PHENTERMINE HYDROCHLORIDE 37.5 MG/1
37.5 TABLET ORAL DAILY
Qty: 30 TABLET | Refills: 0 | Status: SHIPPED | OUTPATIENT
Start: 2021-07-15 | End: 2021-09-03 | Stop reason: SDUPTHER

## 2021-09-01 ENCOUNTER — PATIENT MESSAGE (OUTPATIENT)
Dept: FAMILY MEDICINE | Facility: CLINIC | Age: 51
End: 2021-09-01

## 2021-09-01 RX ORDER — PHENTERMINE HYDROCHLORIDE 37.5 MG/1
37.5 TABLET ORAL DAILY
Qty: 30 TABLET | Refills: 0 | Status: CANCELLED | OUTPATIENT
Start: 2021-09-01

## 2021-09-01 RX ORDER — ERGOCALCIFEROL 1.25 MG/1
CAPSULE ORAL
Qty: 12 CAPSULE | Refills: 0 | Status: SHIPPED | OUTPATIENT
Start: 2021-09-01 | End: 2021-09-03 | Stop reason: SDUPTHER

## 2021-09-02 ENCOUNTER — PATIENT MESSAGE (OUTPATIENT)
Dept: FAMILY MEDICINE | Facility: CLINIC | Age: 51
End: 2021-09-02

## 2021-09-03 ENCOUNTER — PATIENT MESSAGE (OUTPATIENT)
Dept: FAMILY MEDICINE | Facility: CLINIC | Age: 51
End: 2021-09-03

## 2021-09-03 RX ORDER — ERGOCALCIFEROL 1.25 MG/1
CAPSULE ORAL
Qty: 12 CAPSULE | Refills: 0 | Status: SHIPPED | OUTPATIENT
Start: 2021-09-03 | End: 2021-11-21 | Stop reason: SDUPTHER

## 2021-09-03 RX ORDER — PHENTERMINE HYDROCHLORIDE 37.5 MG/1
37.5 TABLET ORAL DAILY
Qty: 30 TABLET | Refills: 0 | Status: SHIPPED | OUTPATIENT
Start: 2021-09-03 | End: 2021-09-27 | Stop reason: SDUPTHER

## 2021-09-09 ENCOUNTER — PATIENT MESSAGE (OUTPATIENT)
Dept: FAMILY MEDICINE | Facility: CLINIC | Age: 51
End: 2021-09-09

## 2021-09-10 ENCOUNTER — PATIENT OUTREACH (OUTPATIENT)
Dept: ADMINISTRATIVE | Facility: HOSPITAL | Age: 51
End: 2021-09-10

## 2021-09-22 ENCOUNTER — PATIENT MESSAGE (OUTPATIENT)
Dept: FAMILY MEDICINE | Facility: CLINIC | Age: 51
End: 2021-09-22

## 2021-09-23 ENCOUNTER — PATIENT MESSAGE (OUTPATIENT)
Dept: FAMILY MEDICINE | Facility: CLINIC | Age: 51
End: 2021-09-23

## 2021-09-27 ENCOUNTER — OFFICE VISIT (OUTPATIENT)
Dept: FAMILY MEDICINE | Facility: CLINIC | Age: 51
End: 2021-09-27
Payer: COMMERCIAL

## 2021-09-27 VITALS
DIASTOLIC BLOOD PRESSURE: 74 MMHG | HEIGHT: 67 IN | HEART RATE: 76 BPM | WEIGHT: 211.31 LBS | BODY MASS INDEX: 33.17 KG/M2 | SYSTOLIC BLOOD PRESSURE: 112 MMHG

## 2021-09-27 DIAGNOSIS — E88.810 METABOLIC SYNDROME: ICD-10-CM

## 2021-09-27 DIAGNOSIS — G43.909 MIGRAINE WITHOUT STATUS MIGRAINOSUS, NOT INTRACTABLE, UNSPECIFIED MIGRAINE TYPE: ICD-10-CM

## 2021-09-27 DIAGNOSIS — R73.02 GLUCOSE INTOLERANCE (IMPAIRED GLUCOSE TOLERANCE): ICD-10-CM

## 2021-09-27 DIAGNOSIS — Z00.00 WELLNESS EXAMINATION: ICD-10-CM

## 2021-09-27 DIAGNOSIS — E78.2 MIXED HYPERLIPIDEMIA: Primary | ICD-10-CM

## 2021-09-27 PROCEDURE — 99214 OFFICE O/P EST MOD 30 MIN: CPT | Mod: S$GLB,,, | Performed by: INTERNAL MEDICINE

## 2021-09-27 PROCEDURE — 3008F PR BODY MASS INDEX (BMI) DOCUMENTED: ICD-10-PCS | Mod: CPTII,S$GLB,, | Performed by: INTERNAL MEDICINE

## 2021-09-27 PROCEDURE — 99999 PR PBB SHADOW E&M-EST. PATIENT-LVL IV: ICD-10-PCS | Mod: PBBFAC,,, | Performed by: INTERNAL MEDICINE

## 2021-09-27 PROCEDURE — 3078F DIAST BP <80 MM HG: CPT | Mod: CPTII,S$GLB,, | Performed by: INTERNAL MEDICINE

## 2021-09-27 PROCEDURE — 1160F PR REVIEW ALL MEDS BY PRESCRIBER/CLIN PHARMACIST DOCUMENTED: ICD-10-PCS | Mod: CPTII,S$GLB,, | Performed by: INTERNAL MEDICINE

## 2021-09-27 PROCEDURE — 3008F BODY MASS INDEX DOCD: CPT | Mod: CPTII,S$GLB,, | Performed by: INTERNAL MEDICINE

## 2021-09-27 PROCEDURE — 99999 PR PBB SHADOW E&M-EST. PATIENT-LVL IV: CPT | Mod: PBBFAC,,, | Performed by: INTERNAL MEDICINE

## 2021-09-27 PROCEDURE — 1159F MED LIST DOCD IN RCRD: CPT | Mod: CPTII,S$GLB,, | Performed by: INTERNAL MEDICINE

## 2021-09-27 PROCEDURE — 1159F PR MEDICATION LIST DOCUMENTED IN MEDICAL RECORD: ICD-10-PCS | Mod: CPTII,S$GLB,, | Performed by: INTERNAL MEDICINE

## 2021-09-27 PROCEDURE — 3074F PR MOST RECENT SYSTOLIC BLOOD PRESSURE < 130 MM HG: ICD-10-PCS | Mod: CPTII,S$GLB,, | Performed by: INTERNAL MEDICINE

## 2021-09-27 PROCEDURE — 3074F SYST BP LT 130 MM HG: CPT | Mod: CPTII,S$GLB,, | Performed by: INTERNAL MEDICINE

## 2021-09-27 PROCEDURE — 99214 PR OFFICE/OUTPT VISIT, EST, LEVL IV, 30-39 MIN: ICD-10-PCS | Mod: S$GLB,,, | Performed by: INTERNAL MEDICINE

## 2021-09-27 PROCEDURE — 1160F RVW MEDS BY RX/DR IN RCRD: CPT | Mod: CPTII,S$GLB,, | Performed by: INTERNAL MEDICINE

## 2021-09-27 PROCEDURE — 3078F PR MOST RECENT DIASTOLIC BLOOD PRESSURE < 80 MM HG: ICD-10-PCS | Mod: CPTII,S$GLB,, | Performed by: INTERNAL MEDICINE

## 2021-09-27 RX ORDER — PHENTERMINE HYDROCHLORIDE 37.5 MG/1
37.5 TABLET ORAL DAILY
Qty: 30 TABLET | Refills: 0 | Status: SHIPPED | OUTPATIENT
Start: 2021-09-27 | End: 2021-10-28 | Stop reason: SDUPTHER

## 2021-10-14 ENCOUNTER — PATIENT MESSAGE (OUTPATIENT)
Dept: FAMILY MEDICINE | Facility: CLINIC | Age: 51
End: 2021-10-14
Payer: COMMERCIAL

## 2021-10-28 ENCOUNTER — OFFICE VISIT (OUTPATIENT)
Dept: FAMILY MEDICINE | Facility: CLINIC | Age: 51
End: 2021-10-28
Payer: COMMERCIAL

## 2021-10-28 VITALS
HEART RATE: 78 BPM | BODY MASS INDEX: 33.17 KG/M2 | DIASTOLIC BLOOD PRESSURE: 86 MMHG | OXYGEN SATURATION: 98 % | SYSTOLIC BLOOD PRESSURE: 114 MMHG | HEIGHT: 67 IN | WEIGHT: 211.31 LBS

## 2021-10-28 DIAGNOSIS — G43.909 MIGRAINE WITHOUT STATUS MIGRAINOSUS, NOT INTRACTABLE, UNSPECIFIED MIGRAINE TYPE: ICD-10-CM

## 2021-10-28 DIAGNOSIS — Z00.00 WELLNESS EXAMINATION: Primary | ICD-10-CM

## 2021-10-28 DIAGNOSIS — E88.810 METABOLIC SYNDROME: ICD-10-CM

## 2021-10-28 DIAGNOSIS — E55.9 VITAMIN D DEFICIENCY: ICD-10-CM

## 2021-10-28 DIAGNOSIS — R73.02 GLUCOSE INTOLERANCE (IMPAIRED GLUCOSE TOLERANCE): ICD-10-CM

## 2021-10-28 DIAGNOSIS — E66.9 OBESITY (BMI 30-39.9): ICD-10-CM

## 2021-10-28 DIAGNOSIS — E83.52 SERUM CALCIUM ELEVATED: ICD-10-CM

## 2021-10-28 DIAGNOSIS — E78.2 MIXED HYPERLIPIDEMIA: ICD-10-CM

## 2021-10-28 DIAGNOSIS — R79.89 ELEVATED TSH: ICD-10-CM

## 2021-10-28 PROCEDURE — 1160F RVW MEDS BY RX/DR IN RCRD: CPT | Mod: CPTII,S$GLB,, | Performed by: INTERNAL MEDICINE

## 2021-10-28 PROCEDURE — 99999 PR PBB SHADOW E&M-EST. PATIENT-LVL III: ICD-10-PCS | Mod: PBBFAC,,, | Performed by: INTERNAL MEDICINE

## 2021-10-28 PROCEDURE — 99396 PR PREVENTIVE VISIT,EST,40-64: ICD-10-PCS | Mod: 25,S$GLB,, | Performed by: INTERNAL MEDICINE

## 2021-10-28 PROCEDURE — 90471 IMMUNIZATION ADMIN: CPT | Mod: S$GLB,,, | Performed by: INTERNAL MEDICINE

## 2021-10-28 PROCEDURE — 3074F PR MOST RECENT SYSTOLIC BLOOD PRESSURE < 130 MM HG: ICD-10-PCS | Mod: CPTII,S$GLB,, | Performed by: INTERNAL MEDICINE

## 2021-10-28 PROCEDURE — 1159F PR MEDICATION LIST DOCUMENTED IN MEDICAL RECORD: ICD-10-PCS | Mod: CPTII,S$GLB,, | Performed by: INTERNAL MEDICINE

## 2021-10-28 PROCEDURE — 99396 PREV VISIT EST AGE 40-64: CPT | Mod: 25,S$GLB,, | Performed by: INTERNAL MEDICINE

## 2021-10-28 PROCEDURE — 3044F HG A1C LEVEL LT 7.0%: CPT | Mod: CPTII,S$GLB,, | Performed by: INTERNAL MEDICINE

## 2021-10-28 PROCEDURE — 90686 IIV4 VACC NO PRSV 0.5 ML IM: CPT | Mod: S$GLB,,, | Performed by: INTERNAL MEDICINE

## 2021-10-28 PROCEDURE — 90686 FLU VACCINE (QUAD) GREATER THAN OR EQUAL TO 3YO PRESERVATIVE FREE IM: ICD-10-PCS | Mod: S$GLB,,, | Performed by: INTERNAL MEDICINE

## 2021-10-28 PROCEDURE — 3074F SYST BP LT 130 MM HG: CPT | Mod: CPTII,S$GLB,, | Performed by: INTERNAL MEDICINE

## 2021-10-28 PROCEDURE — 90471 FLU VACCINE (QUAD) GREATER THAN OR EQUAL TO 3YO PRESERVATIVE FREE IM: ICD-10-PCS | Mod: S$GLB,,, | Performed by: INTERNAL MEDICINE

## 2021-10-28 PROCEDURE — 3079F DIAST BP 80-89 MM HG: CPT | Mod: CPTII,S$GLB,, | Performed by: INTERNAL MEDICINE

## 2021-10-28 PROCEDURE — 99999 PR PBB SHADOW E&M-EST. PATIENT-LVL III: CPT | Mod: PBBFAC,,, | Performed by: INTERNAL MEDICINE

## 2021-10-28 PROCEDURE — 1160F PR REVIEW ALL MEDS BY PRESCRIBER/CLIN PHARMACIST DOCUMENTED: ICD-10-PCS | Mod: CPTII,S$GLB,, | Performed by: INTERNAL MEDICINE

## 2021-10-28 PROCEDURE — 3044F PR MOST RECENT HEMOGLOBIN A1C LEVEL <7.0%: ICD-10-PCS | Mod: CPTII,S$GLB,, | Performed by: INTERNAL MEDICINE

## 2021-10-28 PROCEDURE — 3008F BODY MASS INDEX DOCD: CPT | Mod: CPTII,S$GLB,, | Performed by: INTERNAL MEDICINE

## 2021-10-28 PROCEDURE — 1159F MED LIST DOCD IN RCRD: CPT | Mod: CPTII,S$GLB,, | Performed by: INTERNAL MEDICINE

## 2021-10-28 PROCEDURE — 3008F PR BODY MASS INDEX (BMI) DOCUMENTED: ICD-10-PCS | Mod: CPTII,S$GLB,, | Performed by: INTERNAL MEDICINE

## 2021-10-28 PROCEDURE — 3079F PR MOST RECENT DIASTOLIC BLOOD PRESSURE 80-89 MM HG: ICD-10-PCS | Mod: CPTII,S$GLB,, | Performed by: INTERNAL MEDICINE

## 2021-10-28 RX ORDER — PRAVASTATIN SODIUM 20 MG/1
40 TABLET ORAL DAILY
Qty: 90 TABLET | Refills: 3 | Status: SHIPPED | OUTPATIENT
Start: 2021-10-28 | End: 2021-10-28

## 2021-10-28 RX ORDER — PRAVASTATIN SODIUM 40 MG/1
40 TABLET ORAL DAILY
Qty: 90 TABLET | Refills: 3 | Status: SHIPPED | OUTPATIENT
Start: 2021-10-28 | End: 2023-01-17

## 2021-10-28 RX ORDER — LEVOTHYROXINE SODIUM 25 UG/1
25 TABLET ORAL
Qty: 90 TABLET | Refills: 1 | Status: SHIPPED | OUTPATIENT
Start: 2021-10-28 | End: 2022-04-28

## 2021-10-28 RX ORDER — PHENTERMINE HYDROCHLORIDE 37.5 MG/1
37.5 TABLET ORAL DAILY
Qty: 30 TABLET | Refills: 0 | Status: SHIPPED | OUTPATIENT
Start: 2021-10-28 | End: 2022-01-27 | Stop reason: SDUPTHER

## 2021-11-01 PROBLEM — E55.9 VITAMIN D DEFICIENCY: Status: ACTIVE | Noted: 2021-11-01

## 2021-11-04 ENCOUNTER — PATIENT MESSAGE (OUTPATIENT)
Dept: FAMILY MEDICINE | Facility: CLINIC | Age: 51
End: 2021-11-04
Payer: COMMERCIAL

## 2021-11-08 ENCOUNTER — PATIENT MESSAGE (OUTPATIENT)
Dept: FAMILY MEDICINE | Facility: CLINIC | Age: 51
End: 2021-11-08
Payer: COMMERCIAL

## 2021-12-01 ENCOUNTER — TELEPHONE (OUTPATIENT)
Dept: OTOLARYNGOLOGY | Facility: CLINIC | Age: 51
End: 2021-12-01
Payer: COMMERCIAL

## 2021-12-05 ENCOUNTER — PATIENT MESSAGE (OUTPATIENT)
Dept: OTOLARYNGOLOGY | Facility: CLINIC | Age: 51
End: 2021-12-05
Payer: COMMERCIAL

## 2021-12-06 ENCOUNTER — TELEPHONE (OUTPATIENT)
Dept: OTOLARYNGOLOGY | Facility: CLINIC | Age: 51
End: 2021-12-06
Payer: COMMERCIAL

## 2021-12-07 ENCOUNTER — OFFICE VISIT (OUTPATIENT)
Dept: OTOLARYNGOLOGY | Facility: CLINIC | Age: 51
End: 2021-12-07
Payer: COMMERCIAL

## 2021-12-07 VITALS — WEIGHT: 211.88 LBS | BODY MASS INDEX: 33.26 KG/M2 | HEIGHT: 67 IN

## 2021-12-07 DIAGNOSIS — G43.109 VERTIGINOUS MIGRAINE: ICD-10-CM

## 2021-12-07 DIAGNOSIS — J30.9 ALLERGIC RHINITIS, UNSPECIFIED SEASONALITY, UNSPECIFIED TRIGGER: Primary | ICD-10-CM

## 2021-12-07 DIAGNOSIS — H69.91 DYSFUNCTION OF RIGHT EUSTACHIAN TUBE: ICD-10-CM

## 2021-12-07 PROCEDURE — 99203 PR OFFICE/OUTPT VISIT, NEW, LEVL III, 30-44 MIN: ICD-10-PCS | Mod: S$GLB,,, | Performed by: NURSE PRACTITIONER

## 2021-12-07 PROCEDURE — 99999 PR PBB SHADOW E&M-EST. PATIENT-LVL IV: CPT | Mod: PBBFAC,,, | Performed by: NURSE PRACTITIONER

## 2021-12-07 PROCEDURE — 99203 OFFICE O/P NEW LOW 30 MIN: CPT | Mod: S$GLB,,, | Performed by: NURSE PRACTITIONER

## 2021-12-07 PROCEDURE — 99999 PR PBB SHADOW E&M-EST. PATIENT-LVL IV: ICD-10-PCS | Mod: PBBFAC,,, | Performed by: NURSE PRACTITIONER

## 2021-12-07 RX ORDER — FLUTICASONE PROPIONATE 50 MCG
1 SPRAY, SUSPENSION (ML) NASAL 2 TIMES DAILY
Qty: 16 G | Refills: 12 | Status: SHIPPED | OUTPATIENT
Start: 2021-12-07 | End: 2022-12-07

## 2021-12-08 ENCOUNTER — TELEPHONE (OUTPATIENT)
Dept: NEUROLOGY | Facility: CLINIC | Age: 51
End: 2021-12-08
Payer: COMMERCIAL

## 2021-12-27 ENCOUNTER — PATIENT MESSAGE (OUTPATIENT)
Dept: FAMILY MEDICINE | Facility: CLINIC | Age: 51
End: 2021-12-27
Payer: COMMERCIAL

## 2022-01-05 ENCOUNTER — PATIENT MESSAGE (OUTPATIENT)
Dept: FAMILY MEDICINE | Facility: CLINIC | Age: 52
End: 2022-01-05
Payer: COMMERCIAL

## 2022-01-27 ENCOUNTER — OFFICE VISIT (OUTPATIENT)
Dept: FAMILY MEDICINE | Facility: CLINIC | Age: 52
End: 2022-01-27
Payer: COMMERCIAL

## 2022-01-27 VITALS
DIASTOLIC BLOOD PRESSURE: 84 MMHG | HEIGHT: 67 IN | RESPIRATION RATE: 18 BRPM | BODY MASS INDEX: 33.94 KG/M2 | HEART RATE: 83 BPM | SYSTOLIC BLOOD PRESSURE: 136 MMHG | WEIGHT: 216.25 LBS

## 2022-01-27 DIAGNOSIS — E88.810 METABOLIC SYNDROME: ICD-10-CM

## 2022-01-27 DIAGNOSIS — E66.9 OBESITY (BMI 30-39.9): ICD-10-CM

## 2022-01-27 DIAGNOSIS — Z86.16 HISTORY OF COVID-19: ICD-10-CM

## 2022-01-27 DIAGNOSIS — F98.8 ADD (ATTENTION DEFICIT DISORDER) WITHOUT HYPERACTIVITY: ICD-10-CM

## 2022-01-27 DIAGNOSIS — G43.909 MIGRAINE WITHOUT STATUS MIGRAINOSUS, NOT INTRACTABLE, UNSPECIFIED MIGRAINE TYPE: Primary | ICD-10-CM

## 2022-01-27 PROCEDURE — 3008F BODY MASS INDEX DOCD: CPT | Mod: CPTII,S$GLB,, | Performed by: INTERNAL MEDICINE

## 2022-01-27 PROCEDURE — 3079F DIAST BP 80-89 MM HG: CPT | Mod: CPTII,S$GLB,, | Performed by: INTERNAL MEDICINE

## 2022-01-27 PROCEDURE — 3075F SYST BP GE 130 - 139MM HG: CPT | Mod: CPTII,S$GLB,, | Performed by: INTERNAL MEDICINE

## 2022-01-27 PROCEDURE — 1159F MED LIST DOCD IN RCRD: CPT | Mod: CPTII,S$GLB,, | Performed by: INTERNAL MEDICINE

## 2022-01-27 PROCEDURE — 99999 PR PBB SHADOW E&M-EST. PATIENT-LVL IV: ICD-10-PCS | Mod: PBBFAC,,, | Performed by: INTERNAL MEDICINE

## 2022-01-27 PROCEDURE — 1160F RVW MEDS BY RX/DR IN RCRD: CPT | Mod: CPTII,S$GLB,, | Performed by: INTERNAL MEDICINE

## 2022-01-27 PROCEDURE — 3008F PR BODY MASS INDEX (BMI) DOCUMENTED: ICD-10-PCS | Mod: CPTII,S$GLB,, | Performed by: INTERNAL MEDICINE

## 2022-01-27 PROCEDURE — 99214 OFFICE O/P EST MOD 30 MIN: CPT | Mod: S$GLB,,, | Performed by: INTERNAL MEDICINE

## 2022-01-27 PROCEDURE — 1160F PR REVIEW ALL MEDS BY PRESCRIBER/CLIN PHARMACIST DOCUMENTED: ICD-10-PCS | Mod: CPTII,S$GLB,, | Performed by: INTERNAL MEDICINE

## 2022-01-27 PROCEDURE — 99999 PR PBB SHADOW E&M-EST. PATIENT-LVL IV: CPT | Mod: PBBFAC,,, | Performed by: INTERNAL MEDICINE

## 2022-01-27 PROCEDURE — 99214 PR OFFICE/OUTPT VISIT, EST, LEVL IV, 30-39 MIN: ICD-10-PCS | Mod: S$GLB,,, | Performed by: INTERNAL MEDICINE

## 2022-01-27 PROCEDURE — 3075F PR MOST RECENT SYSTOLIC BLOOD PRESS GE 130-139MM HG: ICD-10-PCS | Mod: CPTII,S$GLB,, | Performed by: INTERNAL MEDICINE

## 2022-01-27 PROCEDURE — 3079F PR MOST RECENT DIASTOLIC BLOOD PRESSURE 80-89 MM HG: ICD-10-PCS | Mod: CPTII,S$GLB,, | Performed by: INTERNAL MEDICINE

## 2022-01-27 PROCEDURE — 1159F PR MEDICATION LIST DOCUMENTED IN MEDICAL RECORD: ICD-10-PCS | Mod: CPTII,S$GLB,, | Performed by: INTERNAL MEDICINE

## 2022-01-27 RX ORDER — NEOMYCIN SULFATE, POLYMYXIN B SULFATE AND HYDROCORTISONE 10; 3.5; 1 MG/ML; MG/ML; [USP'U]/ML
3 SUSPENSION/ DROPS AURICULAR (OTIC) 3 TIMES DAILY
Qty: 10 ML | Refills: 1 | Status: SHIPPED | OUTPATIENT
Start: 2022-01-27 | End: 2022-10-18

## 2022-01-27 RX ORDER — PHENTERMINE HYDROCHLORIDE 37.5 MG/1
37.5 TABLET ORAL DAILY
Qty: 30 TABLET | Refills: 0 | Status: SHIPPED | OUTPATIENT
Start: 2022-01-27 | End: 2022-05-11 | Stop reason: SDUPTHER

## 2022-01-27 RX ORDER — METHYLPHENIDATE HYDROCHLORIDE 20 MG/1
20 TABLET ORAL DAILY
Qty: 30 TABLET | Refills: 0 | Status: SHIPPED | OUTPATIENT
Start: 2022-01-27 | End: 2023-05-25

## 2022-01-27 NOTE — PROGRESS NOTES
Subjective:   .  Get note    Patient ID: Siobhan Richards is a 51 y.o. female.    Gyn:  Dr. Camilo   MM2020  Dexa:  2020 normal  Colonoscopy:  19 cologaurd negative   Immunizations: Flu: yes Tdap: check old note Pneumonia: next visit Shingles: no  Covid: yes   Smoker:  Former    Chief Complaint: Follow-up (meds)    Follow-up  Pertinent negatives include no arthralgias, chest pain, fatigue, headaches, joint swelling, neck pain, vomiting or weakness.     Recently at [facility] had covid and most of her kids.  Feeling better ? When to get booster.     Metbolic syndrome: BMI started 37 now 33. Prev lbs 225 & now 216.  Rx; 1/2 Adipex and Topamax 100. Tolerating meds. Improved headaches.   Changed diet and  doing weight loss too.    GERD: small HH Rx: Protonix 40     Mild low TSH 3.1 has steadily increased over last few checks.-trail of meds did notice diff w energy. R/c TSH 2.2. may or may not restart rx.     Migraines: improved w Topamax 100 mg//ear pain is s/s //  p.r.n. medicines  Leg swelling: Rx prn lasix   HLD: controlled : Rx pravastatin 40.  //,      ADD: off meds currently-wants restart something that doesn't make her tired next day.  prev Adderall 20 and maybe Vyvanse   Vitamin-D deficiency: OTC supplement  Glucose intolerance:  Recheck glucose 94.  Never took Rx metformin -trying weight loss first. //   A1C 5.1 & G 107       Review of Systems:  Review of Systems   Constitutional: Negative for activity change, appetite change, fatigue and unexpected weight change.   HENT: Negative for hearing loss, nosebleeds, rhinorrhea and trouble swallowing.    Eyes: Negative for pain, discharge and visual disturbance.   Respiratory: Negative for choking, chest tightness and wheezing.    Cardiovascular: Negative for chest pain and palpitations.   Gastrointestinal: Negative for blood in stool, constipation, diarrhea and vomiting.   Endocrine: Negative for polydipsia and polyuria.  "  Genitourinary: Negative for difficulty urinating, dysuria, hematuria and menstrual problem.   Musculoskeletal: Negative for arthralgias, joint swelling and neck pain.   Skin: Negative for pallor.   Neurological: Negative for facial asymmetry, weakness and headaches.   Hematological: Does not bruise/bleed easily.   Psychiatric/Behavioral: Negative for confusion and dysphoric mood.       Objective:     Vitals:    01/27/22 1515   BP: 136/84   BP Location: Right arm   Patient Position: Sitting   BP Method: Large (Manual)   Pulse: 83   Resp: 18   Weight: 98.1 kg (216 lb 4.3 oz)   Height: 5' 7" (1.702 m)          Physical Exam  Vitals reviewed.   Constitutional:       Appearance: Normal appearance.   HENT:      Head: Normocephalic and atraumatic.      Mouth/Throat:      Pharynx: Oropharynx is clear.   Eyes:      Extraocular Movements: Extraocular movements intact.      Conjunctiva/sclera: Conjunctivae normal.      Pupils: Pupils are equal, round, and reactive to light.   Cardiovascular:      Rate and Rhythm: Normal rate and regular rhythm.      Heart sounds: Normal heart sounds.   Pulmonary:      Effort: Pulmonary effort is normal.      Breath sounds: Normal breath sounds.   Abdominal:      General: Bowel sounds are normal.      Palpations: Abdomen is soft.   Musculoskeletal:         General: Normal range of motion.      Cervical back: Normal range of motion and neck supple.   Skin:     General: Skin is warm and dry.   Neurological:      General: No focal deficit present.      Mental Status: She is alert and oriented to person, place, and time.   Psychiatric:         Mood and Affect: Mood normal.         Medication List with Changes/Refills   New Medications    METHYLPHENIDATE HCL (RITALIN) 20 MG TABLET    Take 1 tablet (20 mg total) by mouth once daily.    NEOMYCIN-POLYMYXIN-HYDROCORTISONE (CORTISPORIN) 3.5-10,000-1 MG/ML-UNIT/ML-% OTIC SUSPENSION    Place 3 drops into the right ear 3 (three) times daily.   Current " Medications    ASPIRIN-ACETAMINOPHEN-CAFFEINE 250-250-65 MG (EXCEDRIN MIGRAINE) 250-250-65 MG PER TABLET    Excedrin Migraine   prn    COENZYME Q10 200 MG CAPSULE        ERGOCALCIFEROL (ERGOCALCIFEROL) 50,000 UNIT CAP    TAKE ONE CAPSULE BY MOUTH ONE TIME PER WEEK    FLUTICASONE PROPIONATE (FLONASE) 50 MCG/ACTUATION NASAL SPRAY    1 spray (50 mcg total) by Each Nostril route 2 (two) times daily.    FUROSEMIDE (LASIX) 20 MG TABLET    TAKE 1 TABLET ONCE A DAY AS NEEDED SWELLING    LEVOTHYROXINE (SYNTHROID) 25 MCG TABLET    Take 1 tablet (25 mcg total) by mouth before breakfast.    MULTIVITAMIN (DAILY VITAMIN ORAL)        PANTOPRAZOLE (PROTONIX) 40 MG TABLET    Take 1 tablet (40 mg total) by mouth once daily.    PRAVASTATIN (PRAVACHOL) 40 MG TABLET    Take 1 tablet (40 mg total) by mouth once daily.    PROMETHAZINE (PHENERGAN) 25 MG TABLET    Take 1 tablet (25 mg total) by mouth every 6 (six) hours as needed for Nausea.    TOPIRAMATE (TOPAMAX) 50 MG TABLET    Take 1 tablet (50 mg total) by mouth 2 (two) times daily.    TURMERIC ORAL    Take by mouth.    VITAMIN E ACETATE (VITAMIN E ORAL)    Take by mouth.   Changed and/or Refilled Medications    Modified Medication Previous Medication    PHENTERMINE (ADIPEX-P) 37.5 MG TABLET phentermine (ADIPEX-P) 37.5 mg tablet       Take 1 tablet (37.5 mg total) by mouth once daily.    Take 1 tablet (37.5 mg total) by mouth once daily.       Assessment & Plan:  1. Migraine without status migrainosus, not intractable, unspecified migraine type    2. ADD (attention deficit disorder) without hyperactivity    3. Metabolic syndrome    4. History of COVID-19    5. Obesity (BMI 30-39.9)     Migraine without status migrainosus, not intractable, unspecified migraine type    ADD (attention deficit disorder) without hyperactivity  Comments:  trial Ritalin - ? if less SE of next day fatigue     Metabolic syndrome    History of COVID-19    Obesity (BMI 30-39.9)    Other orders  -     phentermine  (ADIPEX-P) 37.5 mg tablet; Take 1 tablet (37.5 mg total) by mouth once daily.  Dispense: 30 tablet; Refill: 0  -     neomycin-polymyxin-hydrocortisone (CORTISPORIN) 3.5-10,000-1 mg/mL-unit/mL-% otic suspension; Place 3 drops into the right ear 3 (three) times daily.  Dispense: 10 mL; Refill: 1  -     methylphenidate HCl (RITALIN) 20 MG tablet; Take 1 tablet (20 mg total) by mouth once daily.  Dispense: 30 tablet; Refill: 0        Continue to work on regular exercise, maintain healthy weight, balanced diet. Avoid unhealthy habits: smoking, excessive alcohol intake.

## 2022-01-28 ENCOUNTER — PATIENT MESSAGE (OUTPATIENT)
Dept: FAMILY MEDICINE | Facility: CLINIC | Age: 52
End: 2022-01-28
Payer: COMMERCIAL

## 2022-01-29 ENCOUNTER — IMMUNIZATION (OUTPATIENT)
Dept: FAMILY MEDICINE | Facility: CLINIC | Age: 52
End: 2022-01-29
Payer: COMMERCIAL

## 2022-01-29 DIAGNOSIS — Z23 NEED FOR VACCINATION: Primary | ICD-10-CM

## 2022-01-29 PROCEDURE — 91300 COVID-19, MRNA, LNP-S, PF, 30 MCG/0.3 ML DOSE VACCINE: CPT | Mod: PBBFAC | Performed by: FAMILY MEDICINE

## 2022-02-01 ENCOUNTER — OFFICE VISIT (OUTPATIENT)
Dept: NEUROLOGY | Facility: CLINIC | Age: 52
End: 2022-02-01
Payer: COMMERCIAL

## 2022-02-01 VITALS
TEMPERATURE: 99 F | BODY MASS INDEX: 34.01 KG/M2 | HEART RATE: 80 BPM | RESPIRATION RATE: 17 BRPM | SYSTOLIC BLOOD PRESSURE: 135 MMHG | WEIGHT: 216.69 LBS | HEIGHT: 67 IN | DIASTOLIC BLOOD PRESSURE: 84 MMHG

## 2022-02-01 DIAGNOSIS — G43.109 VERTIGINOUS MIGRAINE: ICD-10-CM

## 2022-02-01 DIAGNOSIS — K44.9 HIATAL HERNIA: ICD-10-CM

## 2022-02-01 DIAGNOSIS — E78.5 HYPERLIPIDEMIA, UNSPECIFIED HYPERLIPIDEMIA TYPE: ICD-10-CM

## 2022-02-01 DIAGNOSIS — M47.812 CERVICAL SPONDYLOSIS: ICD-10-CM

## 2022-02-01 DIAGNOSIS — G43.719 INTRACTABLE CHRONIC MIGRAINE WITHOUT AURA AND WITHOUT STATUS MIGRAINOSUS: Primary | ICD-10-CM

## 2022-02-01 PROCEDURE — 1159F MED LIST DOCD IN RCRD: CPT | Mod: CPTII,S$GLB,, | Performed by: PSYCHIATRY & NEUROLOGY

## 2022-02-01 PROCEDURE — 99215 PR OFFICE/OUTPT VISIT, EST, LEVL V, 40-54 MIN: ICD-10-PCS | Mod: S$GLB,,, | Performed by: PSYCHIATRY & NEUROLOGY

## 2022-02-01 PROCEDURE — 1159F PR MEDICATION LIST DOCUMENTED IN MEDICAL RECORD: ICD-10-PCS | Mod: CPTII,S$GLB,, | Performed by: PSYCHIATRY & NEUROLOGY

## 2022-02-01 PROCEDURE — 99215 OFFICE O/P EST HI 40 MIN: CPT | Mod: S$GLB,,, | Performed by: PSYCHIATRY & NEUROLOGY

## 2022-02-01 PROCEDURE — 3079F DIAST BP 80-89 MM HG: CPT | Mod: CPTII,S$GLB,, | Performed by: PSYCHIATRY & NEUROLOGY

## 2022-02-01 PROCEDURE — 3079F PR MOST RECENT DIASTOLIC BLOOD PRESSURE 80-89 MM HG: ICD-10-PCS | Mod: CPTII,S$GLB,, | Performed by: PSYCHIATRY & NEUROLOGY

## 2022-02-01 PROCEDURE — 3075F PR MOST RECENT SYSTOLIC BLOOD PRESS GE 130-139MM HG: ICD-10-PCS | Mod: CPTII,S$GLB,, | Performed by: PSYCHIATRY & NEUROLOGY

## 2022-02-01 PROCEDURE — 1160F PR REVIEW ALL MEDS BY PRESCRIBER/CLIN PHARMACIST DOCUMENTED: ICD-10-PCS | Mod: CPTII,S$GLB,, | Performed by: PSYCHIATRY & NEUROLOGY

## 2022-02-01 PROCEDURE — 3075F SYST BP GE 130 - 139MM HG: CPT | Mod: CPTII,S$GLB,, | Performed by: PSYCHIATRY & NEUROLOGY

## 2022-02-01 PROCEDURE — 3008F BODY MASS INDEX DOCD: CPT | Mod: CPTII,S$GLB,, | Performed by: PSYCHIATRY & NEUROLOGY

## 2022-02-01 PROCEDURE — 1160F RVW MEDS BY RX/DR IN RCRD: CPT | Mod: CPTII,S$GLB,, | Performed by: PSYCHIATRY & NEUROLOGY

## 2022-02-01 PROCEDURE — 99999 PR PBB SHADOW E&M-EST. PATIENT-LVL V: CPT | Mod: PBBFAC,,, | Performed by: PSYCHIATRY & NEUROLOGY

## 2022-02-01 PROCEDURE — 3008F PR BODY MASS INDEX (BMI) DOCUMENTED: ICD-10-PCS | Mod: CPTII,S$GLB,, | Performed by: PSYCHIATRY & NEUROLOGY

## 2022-02-01 PROCEDURE — 99999 PR PBB SHADOW E&M-EST. PATIENT-LVL V: ICD-10-PCS | Mod: PBBFAC,,, | Performed by: PSYCHIATRY & NEUROLOGY

## 2022-02-01 RX ORDER — ONDANSETRON 4 MG/1
4 TABLET, ORALLY DISINTEGRATING ORAL EVERY 8 HOURS PRN
Qty: 14 TABLET | Refills: 3 | Status: SHIPPED | OUTPATIENT
Start: 2022-02-01 | End: 2023-03-23 | Stop reason: SDUPTHER

## 2022-02-01 RX ORDER — RIMEGEPANT SULFATE 75 MG/75MG
75 TABLET, ORALLY DISINTEGRATING ORAL ONCE AS NEEDED
Qty: 8 TABLET | Refills: 11 | Status: SHIPPED | OUTPATIENT
Start: 2022-02-01 | End: 2022-02-15

## 2022-02-01 RX ORDER — ELETRIPTAN HYDROBROMIDE 40 MG/1
40 TABLET, FILM COATED ORAL
Qty: 12 TABLET | Refills: 3 | Status: SHIPPED | OUTPATIENT
Start: 2022-02-01 | End: 2023-03-20

## 2022-02-01 NOTE — PROGRESS NOTES
[facility]    Chief complaint: migraine   Referred by: Macey Drake NP  [address] [place] [address]     History of Present Illness:    51Y RHF with chronic migraine, cervical spondylosis, HL, hiatal hernia who presents for initial evaluation of headache. Of note she had seen Dr. Vergara in 3/19 at which time she was diagnosed with chronic migraine and vestibular migraine and prescribed venlafaxine for prevention, for acute management rizatriptan po and sumatriptan sc. She mentions that venlafaxine did not work.  She is here to re establish care.      She mentions that she has a neck positional headache which is more when she bends her neck to the right maybe or she wakes up from sleep, it's mainly neck pain and then from there progresses and into the right side , they can be triggered for 2 minutes of laying her head on 's shoulder, the neck ones typically progress into the nausea/vomiting. She will have light/sound sensitivity with it.      The sinus headaches she describes as more fullness around the eyes, into the eyes,  frontally, top of head, variable locations, these tend to get moderate to severe. She has light/sound sensitivity as well, nausea/vomiting with these as well. She treats these with excedrin, mucinex, tylenol and improves it. She is not taking imitrex currently. She uses excedrin about 2 days/week only. She will use mucinex about 2 days a week. She tries to limit her OTC use.      She is currently on TPX 50mg bid and feels it's helping her with the intensity/frequency and had been initially on it for weight loss. She did not ice that headaches were not as severe as they had been before. When she was off it headaches worsened.  She has been having more recently fullness on Rt ear and went to ENT where she was told it could be ETD vs. Vestibular migraine.     Headache history confirmed on today's visit:  Age at onset and course over time:  "headaches since childhood, worse with puberty, ovulation, and childbirth. She reported multiple headache types.   History from Dr. Vergara (prior headache specialist): "One is triggered by smell and is acute, unilateral, with nausea and vomiting, resolves after a couple of hours (very quick progression). 2nd is one that occurs upon waking, from occipital to eye, lastrs one day, photo and nausea and vomiting but not always. She associated this with a lipoma that she had removed. This occurs 2-3 times per month. Third headache is a "sinus headache" that occurs with weather changes, sometimes escalates to the 2nd type, this is the most frequent type. She was referred by ENT. She also has dizziness but this is intermittent. She feels off balance. Gets dizzy when looking up 9 (in [employer]), sec to minutes. No other types of dizziness."  She was diagnosed with chronic migraine and venlafaxine was prescribed which was ineffective. Currently she is on TPX 50 mg bid with improvement intensity/frequency. Feels either related to sinus or neck movements and continues to find she has different heaache types depending on trigger.      Location: unilateral eye, neck, left more than right   Quality:  [x]? pressure []? tight [x]? throbbing []? sharp []? stabbing   Severity: 1 to 10  Duration: hours to days, lately longer   Frequency: 15-20 headache days per month   Headaches awaken at night?: 2 per month   Worst time of day:  Depends on the headache   Associated with: [x]? photophobia [x]?  phonophobia [x]? osmophobia [x]? blurred vision  [x]? double vision []? loss of appetite [x]? nausea [x]? vomiting [x]? dizziness [x]? vertigo  []? tinnitus []? irritability [x]? sinus pressure []? problems with concentration   [x]? neck tightness   Alleviated by:  [x]? sleep [x]? darkness [x]? massage []? heat []? ice [x]? medication  Exacerbated by:  [x]? fatigue [x]? light []? noise [x]? smells []? coughing []? sneezing  []? bending over []? " ovulation []? menses []? alcohol []? change in weather []?  stress  Ipsilateral autonomic: []? nasal congestion []? lacrimation []? ptosis []? injection []? edema []? foreign body sensation []? ear fullness   Family history: mother had migraine, sister has migraine, her own son had migraine as well as a child, then early adulthood  ICP:   Sleep habits: falls asleep OK but has a hard time staying asleep (teenager related)   Caffeine intake: 1-2   Gyn status (if female):      Current acute treatment    -- excedrin about 2 days a week.   --rizatriptan ineffective     Current prevention:   topiramate 50mg bid - help with intensity/frequency    Previously tried/failed acute treatment - would take too late   -- sumatriptan   -- zomig   -- rizatriptan 10mg   -- sumatriptan 4mg SC injection     Previously tried/failed preventative treatment:  --venlafaxine   -- amitriptyline   -- topiramate 50mg bid - since 2016   -- mag/coq10    MIDAS:      Neuroimaging and other studies:   XRAY 4/19:  FINDINGS:  There is reversal of the normal cervical lordosis which could relate to neck muscle spasm.  There is a 3 mm retrolisthesis of C3 on C4 with extension positioning of the neck which corrects with neutral and flexion positioning of the neck.  There is multilevel degenerative change of the cervical spine in the form of marginal osteophyte formation and disc space narrowing, most pronounced at C4-C5, C5-C6, and C6-C7.  No acute fracture or osseous destructive process.  There is right neuroforaminal stenosis present at C3-C4, C4-C5, and C5-C6.  No prevertebral soft tissue swelling.  The visualized lung apices are unremarkable.     IMPRESSION:      As above    ROS: The fourteen point review of system was performed.   Constitutional:  Denies fevers/cold or heat intolerance, weight loss/gain or fatigue.  Eyes:                        Denies diplopia, ptosis, visual field defects or ocular disease   excepting any listed above.  ENT:   +ear  pressure, Denies hearing loss, infection, carcinoma or other diseases excepting any listed above.   Cardiovascular:  Denies stroke, CAD, arrhythmia, CHF or other disease excepting any listed above.  Pulmonary:  Denies dyspnea, COPD, RAD or infection or neoplasm excepting any listed above.  Gastrointestinal: Denies ulcer disease, liver or other disease excepting any listed above.  Skin:   Denies rash, skin cancer, or other cutaneous disorder excepting any listed above.  Neurological:  See HPI  Musculoskeletal: Denies RA, fractures or other joint or skeletal problems excepting any listed above.  Heme/Lymphatic: Denies any blood or lymph system neoplasm or disorder excepting any listed above.  Endocrine:  Denies any thyroid or other disorders, excepting any listed above.  Allergic/Immuno: Denies any autoimmune disease or allergy excepting any listed above.  Psychiatric:  Denies any disorder or treatment excepting any listed above.  Urologic:  Denies any difficulties with the urinary system or sexual function except noted above.    Past Medical History:   Diagnosis Date    Allergy     Arthritis     COVID-19 virus infection     2021    Dizziness     GERD (gastroesophageal reflux disease)     Headache(784.0)     Hiatal hernia     Hyperlipidemia     Obesity     PONV (postoperative nausea and vomiting)     Rash     Sinusitis     Snoring        Past Surgical History:   Procedure Laterality Date    BREAST BIOPSY Right 2009    core bx.    BREAST BIOPSY Right 2009    removal of duct    BREAST SURGERY   (?)    Rt duct     SECTION      CHOLECYSTECTOMY      FIXATION OF SYNDESMOSIS OF ANKLE Right 2019    Procedure: FIXATION, SYNDESMOSIS, ANKLE;  Surgeon: Fuentes Devries MD;  Location: [facility];  Service: Orthopedics;  Laterality: Right;    FRACTURE SURGERY  19    Rt ankle    LIPOMA RESECTION Left 10/24/2018    Procedure: EXCISION, LIPOMA - Left Scapula;  Surgeon: Smith Kenyon,  MD;  Location: [facility] OR;  Service: General;  Laterality: Left;    OPEN REDUCTION AND INTERNAL FIXATION (ORIF) OF INJURY OF ANKLE Right 4/23/2019    Procedure: ORIF, ANKLE;  Surgeon: Fuentes Devries MD;  [facility];  Service: Orthopedics;  Laterality: Right;         Family History   Problem Relation Age of Onset    Heart disease Mother     Lung cancer Father     Goiter Father     Thyroid cancer Sister     Heart attack Brother     Cancer Paternal Aunt         Pancreatic    Cancer Maternal Uncle         Colon    Thyroid nodules Sister      Social History     Tobacco Use    Smoking status: Former Smoker     Packs/day: 0.00     Years: 0.00     Pack years: 0.00    Smokeless tobacco: Never Used   Substance Use Topics    Alcohol use: No    Drug use: No     Review of patient's allergies indicates:  No Known Allergies      Current Outpatient Medications:     aspirin-acetaminophen-caffeine 250-250-65 mg (EXCEDRIN MIGRAINE) 250-250-65 mg per tablet, Excedrin Migraine  prn, Disp: , Rfl:     coenzyme Q10 200 mg capsule, , Disp: , Rfl:     ergocalciferol (ERGOCALCIFEROL) 50,000 unit Cap, TAKE ONE CAPSULE BY MOUTH ONE TIME PER WEEK, Disp: 12 capsule, Rfl: 0    fluticasone propionate (FLONASE) 50 mcg/actuation nasal spray, 1 spray (50 mcg total) by Each Nostril route 2 (two) times daily., Disp: 16 g, Rfl: 12    furosemide (LASIX) 20 MG tablet, TAKE 1 TABLET ONCE A DAY AS NEEDED SWELLING, Disp: , Rfl: 12    levothyroxine (SYNTHROID) 25 MCG tablet, Take 1 tablet (25 mcg total) by mouth before breakfast., Disp: 90 tablet, Rfl: 1    methylphenidate HCl (RITALIN) 20 MG tablet, Take 1 tablet (20 mg total) by mouth once daily., Disp: 30 tablet, Rfl: 0    multivitamin (DAILY VITAMIN ORAL), , Disp: , Rfl:     neomycin-polymyxin-hydrocortisone (CORTISPORIN) 3.5-10,000-1 mg/mL-unit/mL-% otic suspension, Place 3 drops into the right ear 3 (three) times daily., Disp: 10 mL, Rfl: 1    pantoprazole (PROTONIX) 40 MG  tablet, Take 1 tablet (40 mg total) by mouth once daily., Disp: 90 tablet, Rfl: 3    phentermine (ADIPEX-P) 37.5 mg tablet, Take 1 tablet (37.5 mg total) by mouth once daily., Disp: 30 tablet, Rfl: 0    pravastatin (PRAVACHOL) 40 MG tablet, Take 1 tablet (40 mg total) by mouth once daily., Disp: 90 tablet, Rfl: 3    promethazine (PHENERGAN) 25 MG tablet, Take 1 tablet (25 mg total) by mouth every 6 (six) hours as needed for Nausea., Disp: 15 tablet, Rfl: 0    topiramate (TOPAMAX) 50 MG tablet, Take 1 tablet (50 mg total) by mouth 2 (two) times daily., Disp: 180 tablet, Rfl: 2    TURMERIC ORAL, Take by mouth., Disp: , Rfl:     vitamin E acetate (VITAMIN E ORAL), Take by mouth., Disp: , Rfl:     PHYSICAL EXAMINATION  Vitals:    02/01/22 1538   BP: 135/84   Pulse: 80   Resp: 17   Temp: 98.6 °F (37 °C)   General: The patient is a well-developed, well-nourished looking of stated age in no acute distress.  Head: Normocephalic, atraumatic  Eyes, ears, nose and throat: normal.  Neck: Supple  Cardiovascular: No carotid bruits.  Regular rate and rhythm.   NEUROLOGIC EXAM:  MENTAL: The patient is awake, alert and oriented times to time, place, location and situation. Intact recent memory, attention, concentration. Language and speech are normal. No aphasia, no dysarthria  CRANIAL NERVES:   CN II: visual fields are intact to confrontation with no defects;  funduscopic examination is within normal limits without evidence of papilledema and signs of venous pulsations.  Pupils are equal, round and reactive to light and accommodation.    III, IV and VI: extraocular movements are intact to all directions of gaze with normal convergence.  V: facial sensation is intact to light touch in divisions V1 through V3.    VII: Facial muscle strength is intact to eyebrow raising, forceful eyelid closure, and cheek puffing.    VIII: Hearing acuity is intact to finger rub.  IX, X: palate rises symmetrically and uvula midline.    XI:  Sternocleidomastoid and trapezius strength are 5/5 bilaterally.    XII: Tongue protrudes midline without atrophy or fasciculations.   MOTOR EXAM: No atrophy or fasciculations are present. No pronator drift,  shows normal strength ( 5/5 strength )in all 4 extremities. Tone is normal.   SENSORY EXAM: intact light touch, vibration, and position bilaterally.  CEREBELLAR EXAM: shows normal finger-to-nose and heel-to-shin.   DEEP TENDON REFLEXES:  +2 in brachioradialis, biceps, triceps, knee jerks, ankle jerks, downgoing toes b/l. No abnormal reflexes are present.  GAIT/STANCE: Romberg Negative.  Standard gait was normal with normal stride, arm swing and turning.  Normal heel and toe walking and tandem gait.       Impression:  Chronic migraine without aura - she has strong family history of migraine, she has had migraine since childhood, worsening with puberty. I discussed today that the different triggers and headache types she has describe are part of the spectrum of migraine. She has tried 2 oral preventive where topiramate has continued to help intensity/frequency, but still having over 15 days a month over 4 hours each attack and at least 8 are migraine. At this point we will apply for Botox.   In today's clinic visit we provided patient with education on their headache diagnosis, pathophysiology, triggers, aggravating and improving factors, risk factors for chronification of migraine, preventive management, non-pharmacological management and proper use of acute management medications.     Cervical spondylosis without radiculopathy or myelopathy     Vestibular migraine- discussed vesibular migraine classification criteria, ear pressure is not part of the criteria. I discussed this with patient.     Comorbidities:  HL- on pravastatin   hiatal hernia - history of erosive gastritis,  Currently PPI, avoid nsaids.  Vitamin D deficiency - on supplemntation    Plan:   1- For preventive management: a. The patient has chronic  migraines (G43.719). Patient suffers from headaches more than 15 days a month lasting more than 4 hours a day with no relief of symptoms despite trying multiple medications including but not limited to anti- epileptics,  antihypertensives,  and antidepressants. Botox treatment was approved for chronic migraines in October 2010. The patient will be an ideal candidate for Botox. We are planning for 3 treatments 3 months apart and aiming for atleast 50% improvement in the symptoms. If we see no improvement after 3 treatments, we will discontinue the injections.      Muscles to be injected:  total of 155 units of botulinum toxin type A were  injected in the following muscles: Procerus 5 units,  5 units bilaterally, frontalis 20 units, temporalis 20 units bilaterally, occipitalis 15 units, upper cervical paraspinals 10 units bilaterally and trapezius 15 units bilaterally. Total of 155 units in 31 sites.    2- Acute management: Eletriptan 40 mg at onset of escalation, can repeat after 2 hours. Max 2/day.   With or without   Nurtec ODT 75mg at onset of escalation . Max 1/day . Sent to [facility] for prior authorization.     For nausea: phenergan if going to sleep or zofran if staying awake    3- Keep headache diary     RTC in 3 weeks for Botox.         Edna Gillis MD   Board Certified Neurologist  [facility] Certified Headache Medicine     I spent  43   minutes on day of this encounter preparing, treating, and managing this patient with chronic migraine, vestibular migraine, cervical spondylosis, HL, hiatal hernia.

## 2022-02-01 NOTE — PATIENT INSTRUCTIONS
1- For preventive management: A.  Botox for chronic migraine     2- Acute management: Eletriptan 40 mg at onset of escalation, can repeat after 2 hours. Max 2/day.   With or without   Nurtec ODT 75mg at onset of escalation . Max 1/day . Sent to [facility] for prior authorization.     For nausea: phenergan if going to sleep or zofran if staying awake

## 2022-02-14 ENCOUNTER — TELEPHONE (OUTPATIENT)
Dept: PHARMACY | Facility: CLINIC | Age: 52
End: 2022-02-14
Payer: COMMERCIAL

## 2022-02-14 RX ORDER — ERGOCALCIFEROL 1.25 MG/1
CAPSULE ORAL
Qty: 12 CAPSULE | Refills: 0 | Status: SHIPPED | OUTPATIENT
Start: 2022-02-14 | End: 2022-07-01 | Stop reason: SDUPTHER

## 2022-02-14 NOTE — TELEPHONE ENCOUNTER
Nurtec prescription has been APPROVED FROM 2/11/2022 TO 5/11/2022 with copayment of $0.       [facility] [place] @[phone] will reach out to patient for further correspondence.

## 2022-03-17 ENCOUNTER — PATIENT MESSAGE (OUTPATIENT)
Dept: FAMILY MEDICINE | Facility: CLINIC | Age: 52
End: 2022-03-17
Payer: COMMERCIAL

## 2022-04-07 RX ORDER — TOPIRAMATE 50 MG/1
TABLET, FILM COATED ORAL
Qty: 180 TABLET | Refills: 2 | Status: SHIPPED | OUTPATIENT
Start: 2022-04-07 | End: 2023-05-25 | Stop reason: SDUPTHER

## 2022-04-28 RX ORDER — LEVOTHYROXINE SODIUM 25 UG/1
TABLET ORAL
Qty: 90 TABLET | Refills: 2 | Status: SHIPPED | OUTPATIENT
Start: 2022-04-28 | End: 2022-10-18

## 2022-04-28 NOTE — TELEPHONE ENCOUNTER
No new care gaps identified.  Powered by [employer] Reference number: [id number].   4/28/2022 12:13:04 AM CDT

## 2022-04-28 NOTE — TELEPHONE ENCOUNTER
Refill Authorization Note   Siobhan Susie  is requesting a refill authorization.  Brief Assessment and Rationale for Refill:  Approve     Medication Therapy Plan:       Medication Reconciliation Completed: No   Comments:     No Care Gaps recommended.     Note composed:9:35 AM 04/28/2022

## 2022-05-09 ENCOUNTER — PATIENT MESSAGE (OUTPATIENT)
Dept: FAMILY MEDICINE | Facility: CLINIC | Age: 52
End: 2022-05-09
Payer: COMMERCIAL

## 2022-05-11 ENCOUNTER — LAB VISIT (OUTPATIENT)
Dept: LAB | Facility: HOSPITAL | Age: 52
End: 2022-05-11
Attending: INTERNAL MEDICINE
Payer: COMMERCIAL

## 2022-05-11 ENCOUNTER — OFFICE VISIT (OUTPATIENT)
Dept: FAMILY MEDICINE | Facility: CLINIC | Age: 52
End: 2022-05-11
Payer: COMMERCIAL

## 2022-05-11 VITALS
HEIGHT: 67 IN | SYSTOLIC BLOOD PRESSURE: 128 MMHG | WEIGHT: 218.38 LBS | HEART RATE: 88 BPM | OXYGEN SATURATION: 98 % | DIASTOLIC BLOOD PRESSURE: 76 MMHG | BODY MASS INDEX: 34.28 KG/M2

## 2022-05-11 DIAGNOSIS — R30.0 DYSURIA: ICD-10-CM

## 2022-05-11 DIAGNOSIS — E78.2 MIXED HYPERLIPIDEMIA: ICD-10-CM

## 2022-05-11 DIAGNOSIS — E66.9 OBESITY (BMI 30-39.9): ICD-10-CM

## 2022-05-11 DIAGNOSIS — D80.3 IGG3 SUBCLASS DEFICIENCY: ICD-10-CM

## 2022-05-11 DIAGNOSIS — Z91.018 FOOD ALLERGY: ICD-10-CM

## 2022-05-11 DIAGNOSIS — J30.89 ENVIRONMENTAL AND SEASONAL ALLERGIES: ICD-10-CM

## 2022-05-11 DIAGNOSIS — R30.0 DYSURIA: Primary | ICD-10-CM

## 2022-05-11 PROCEDURE — 3074F SYST BP LT 130 MM HG: CPT | Mod: CPTII,S$GLB,, | Performed by: INTERNAL MEDICINE

## 2022-05-11 PROCEDURE — 3008F BODY MASS INDEX DOCD: CPT | Mod: CPTII,S$GLB,, | Performed by: INTERNAL MEDICINE

## 2022-05-11 PROCEDURE — 3078F PR MOST RECENT DIASTOLIC BLOOD PRESSURE < 80 MM HG: ICD-10-PCS | Mod: CPTII,S$GLB,, | Performed by: INTERNAL MEDICINE

## 2022-05-11 PROCEDURE — 99999 PR PBB SHADOW E&M-EST. PATIENT-LVL IV: ICD-10-PCS | Mod: PBBFAC,,, | Performed by: INTERNAL MEDICINE

## 2022-05-11 PROCEDURE — 1159F PR MEDICATION LIST DOCUMENTED IN MEDICAL RECORD: ICD-10-PCS | Mod: CPTII,S$GLB,, | Performed by: INTERNAL MEDICINE

## 2022-05-11 PROCEDURE — 99214 OFFICE O/P EST MOD 30 MIN: CPT | Mod: 25,S$GLB,, | Performed by: INTERNAL MEDICINE

## 2022-05-11 PROCEDURE — 3074F PR MOST RECENT SYSTOLIC BLOOD PRESSURE < 130 MM HG: ICD-10-PCS | Mod: CPTII,S$GLB,, | Performed by: INTERNAL MEDICINE

## 2022-05-11 PROCEDURE — 90471 PNEUMOCOCCAL POLYSACCHARIDE VACCINE 23-VALENT =>2YO SQ IM: ICD-10-PCS | Mod: S$GLB,,, | Performed by: INTERNAL MEDICINE

## 2022-05-11 PROCEDURE — 99999 PR PBB SHADOW E&M-EST. PATIENT-LVL IV: CPT | Mod: PBBFAC,,, | Performed by: INTERNAL MEDICINE

## 2022-05-11 PROCEDURE — 3008F PR BODY MASS INDEX (BMI) DOCUMENTED: ICD-10-PCS | Mod: CPTII,S$GLB,, | Performed by: INTERNAL MEDICINE

## 2022-05-11 PROCEDURE — 99214 PR OFFICE/OUTPT VISIT, EST, LEVL IV, 30-39 MIN: ICD-10-PCS | Mod: 25,S$GLB,, | Performed by: INTERNAL MEDICINE

## 2022-05-11 PROCEDURE — 90732 PNEUMOCOCCAL POLYSACCHARIDE VACCINE 23-VALENT =>2YO SQ IM: ICD-10-PCS | Mod: S$GLB,,, | Performed by: INTERNAL MEDICINE

## 2022-05-11 PROCEDURE — 90732 PPSV23 VACC 2 YRS+ SUBQ/IM: CPT | Mod: S$GLB,,, | Performed by: INTERNAL MEDICINE

## 2022-05-11 PROCEDURE — 1160F PR REVIEW ALL MEDS BY PRESCRIBER/CLIN PHARMACIST DOCUMENTED: ICD-10-PCS | Mod: CPTII,S$GLB,, | Performed by: INTERNAL MEDICINE

## 2022-05-11 PROCEDURE — 1159F MED LIST DOCD IN RCRD: CPT | Mod: CPTII,S$GLB,, | Performed by: INTERNAL MEDICINE

## 2022-05-11 PROCEDURE — 1160F RVW MEDS BY RX/DR IN RCRD: CPT | Mod: CPTII,S$GLB,, | Performed by: INTERNAL MEDICINE

## 2022-05-11 PROCEDURE — 3078F DIAST BP <80 MM HG: CPT | Mod: CPTII,S$GLB,, | Performed by: INTERNAL MEDICINE

## 2022-05-11 PROCEDURE — 87086 URINE CULTURE/COLONY COUNT: CPT | Performed by: INTERNAL MEDICINE

## 2022-05-11 PROCEDURE — 81001 URINALYSIS AUTO W/SCOPE: CPT | Performed by: INTERNAL MEDICINE

## 2022-05-11 PROCEDURE — 90471 IMMUNIZATION ADMIN: CPT | Mod: S$GLB,,, | Performed by: INTERNAL MEDICINE

## 2022-05-11 RX ORDER — PHENTERMINE HYDROCHLORIDE 37.5 MG/1
37.5 TABLET ORAL DAILY
Qty: 30 TABLET | Refills: 0 | Status: SHIPPED | OUTPATIENT
Start: 2022-05-11 | End: 2022-07-21 | Stop reason: SDUPTHER

## 2022-05-11 NOTE — PROGRESS NOTES
Subjective:   .  Get note    Patient ID: Siobhan Richards is a 51 y.o. female.    Gyn:  Dr. Camilo   MM2021  Dexa:  2020 normal  Colonoscopy:  19 cologaurd negative / will due colon this year   Immunizations: Flu: yes Tdap: check old note Pneumonia: 22 Shingles: no  Covid: yes   Smoker:  Former    Chief Complaint: Follow-up (Medication refill)    Follow-up  Pertinent negatives include no arthralgias, chest pain, fatigue, headaches, joint swelling, neck pain, vomiting or weakness.     Allergies: reports No allergy to mold,dust//  + tree, grass.- Dr Dominguez recommended shots - hasnt gone back yet. Pnx titers all low -will boost today w vaccine and rc       C/o bladder irritation  Recently tx UTI rx bactrium but doesn't feel resolved.  Like to r/c urine.     Metbolic syndrome: BMI started 37 maintained now 34. Lbs 218.   Wants to restart # 1 Rx; 1/2 Adipex and Topamax 100. Tolerating meds. Improved headaches.   Changed diet and  doing weight loss too.    GERD: small HH Rx: Protonix 40   Mild low TSH 3.1 has steadily increased over last few checks.-trail of meds did notice diff w energy, stopped.   Migraines: improved w Topamax 100 mg//ear pain is s/s //  p.r.n. medicines  Leg swelling: Rx prn lasix   HLD: controlled : Rx pravastatin 40.  //,      ADD: off meds currently-wants restart something that doesn't make her tired next day.  prev Adderall 20 and maybe Vyvanse   Vitamin-D deficiency: OTC supplement  Glucose intolerance:  G 91  Never took Rx metformin -trying weight loss first. //   A1C 5.1 & G 107       Review of Systems:  Review of Systems   Constitutional: Negative for activity change, appetite change, fatigue and unexpected weight change.   HENT: Negative for hearing loss, nosebleeds, rhinorrhea and trouble swallowing.    Eyes: Negative for pain, discharge and visual disturbance.   Respiratory: Negative for choking, chest tightness and wheezing.   "  Cardiovascular: Negative for chest pain and palpitations.   Gastrointestinal: Negative for blood in stool, constipation, diarrhea and vomiting.   Endocrine: Negative for polydipsia and polyuria.   Genitourinary: Negative for difficulty urinating, dysuria, hematuria and menstrual problem.   Musculoskeletal: Negative for arthralgias, joint swelling and neck pain.   Skin: Negative for pallor.   Neurological: Negative for facial asymmetry, weakness and headaches.   Hematological: Does not bruise/bleed easily.   Psychiatric/Behavioral: Negative for confusion and dysphoric mood.       Objective:     Vitals:    05/11/22 1338   BP: 128/76   Pulse: 88   SpO2: 98%   Weight: 99 kg (218 lb 5.9 oz)   Height: 5' 7" (1.702 m)          Physical Exam  Vitals reviewed.   Constitutional:       Appearance: Normal appearance.   HENT:      Head: Normocephalic and atraumatic.      Mouth/Throat:      Pharynx: Oropharynx is clear.   Eyes:      Extraocular Movements: Extraocular movements intact.      Conjunctiva/sclera: Conjunctivae normal.      Pupils: Pupils are equal, round, and reactive to light.   Cardiovascular:      Rate and Rhythm: Normal rate and regular rhythm.      Heart sounds: Normal heart sounds.   Pulmonary:      Effort: Pulmonary effort is normal.      Breath sounds: Normal breath sounds.   Abdominal:      General: Bowel sounds are normal.      Palpations: Abdomen is soft.   Musculoskeletal:         General: Normal range of motion.      Cervical back: Normal range of motion and neck supple.   Skin:     General: Skin is warm and dry.   Neurological:      General: No focal deficit present.      Mental Status: She is alert and oriented to person, place, and time.   Psychiatric:         Mood and Affect: Mood normal.         Medication List with Changes/Refills   Current Medications    ASPIRIN-ACETAMINOPHEN-CAFFEINE 250-250-65 MG (EXCEDRIN MIGRAINE) 250-250-65 MG PER TABLET    Excedrin Migraine   prn    COENZYME Q10 200 MG " CAPSULE        ELETRIPTAN (RELPAX) 40 MG TABLET    Take 1 tablet (40 mg total) by mouth as needed (migraine). may repeat in 2 hours if necessary. Max is 2/day.    ERGOCALCIFEROL (ERGOCALCIFEROL) 50,000 UNIT CAP    TAKE ONE CAPSULE BY MOUTH ONE TIME PER WEEK    FLUTICASONE PROPIONATE (FLONASE) 50 MCG/ACTUATION NASAL SPRAY    1 spray (50 mcg total) by Each Nostril route 2 (two) times daily.    FUROSEMIDE (LASIX) 20 MG TABLET    TAKE 1 TABLET ONCE A DAY AS NEEDED SWELLING    LEVOTHYROXINE (SYNTHROID) 25 MCG TABLET    TAKE 1 TABLET BY MOUTH BEFORE BREAKFAST.    METHYLPHENIDATE HCL (RITALIN) 20 MG TABLET    Take 1 tablet (20 mg total) by mouth once daily.    MULTIVITAMIN (DAILY VITAMIN ORAL)        NEOMYCIN-POLYMYXIN-HYDROCORTISONE (CORTISPORIN) 3.5-10,000-1 MG/ML-UNIT/ML-% OTIC SUSPENSION    Place 3 drops into the right ear 3 (three) times daily.    ONDANSETRON (ZOFRAN-ODT) 4 MG TBDL    Take 1 tablet (4 mg total) by mouth every 8 (eight) hours as needed (nausea).    PANTOPRAZOLE (PROTONIX) 40 MG TABLET    Take 1 tablet (40 mg total) by mouth once daily.    PRAVASTATIN (PRAVACHOL) 40 MG TABLET    Take 1 tablet (40 mg total) by mouth once daily.    PROMETHAZINE (PHENERGAN) 25 MG TABLET    Take 1 tablet (25 mg total) by mouth every 6 (six) hours as needed for Nausea.    TOPIRAMATE (TOPAMAX) 50 MG TABLET    TAKE 1 TABLET BY MOUTH TWICE A DAY    TURMERIC ORAL    Take by mouth.    VITAMIN E ACETATE (VITAMIN E ORAL)    Take by mouth.   Changed and/or Refilled Medications    Modified Medication Previous Medication    PHENTERMINE (ADIPEX-P) 37.5 MG TABLET phentermine (ADIPEX-P) 37.5 mg tablet       Take 1 tablet (37.5 mg total) by mouth once daily.    Take 1 tablet (37.5 mg total) by mouth once daily.       Assessment & Plan:  1. Dysuria  - Urine culture  - Urinalysis; Future    2. IgG3 subclass deficiency    3. Food allergy    4. Environmental and seasonal allergies    5. Obesity (BMI 30-39.9)    6. Mixed hyperlipidemia      Dysuria  -     Urine culture  -     Urinalysis; Future; Expected date: 05/11/2022    IgG3 subclass deficiency    Food allergy    Environmental and seasonal allergies    Obesity (BMI 30-39.9)    Mixed hyperlipidemia    Other orders  -     (In Office Administered) Pneumococcal Polysaccharide Vaccine (23 Valent) (SQ/IM)  -     phentermine (ADIPEX-P) 37.5 mg tablet; Take 1 tablet (37.5 mg total) by mouth once daily.  Dispense: 30 tablet; Refill: 0        Continue to work on regular exercise, maintain healthy weight, balanced diet. Avoid unhealthy habits: smoking, excessive alcohol intake.

## 2022-05-12 ENCOUNTER — PATIENT MESSAGE (OUTPATIENT)
Dept: FAMILY MEDICINE | Facility: CLINIC | Age: 52
End: 2022-05-12
Payer: COMMERCIAL

## 2022-05-12 LAB
BACTERIA #/AREA URNS AUTO: ABNORMAL /HPF
BILIRUB UR QL STRIP: NEGATIVE
CAOX CRY UR QL COMP ASSIST: ABNORMAL
CLARITY UR REFRACT.AUTO: CLEAR
COLOR UR AUTO: YELLOW
GLUCOSE UR QL STRIP: NEGATIVE
HGB UR QL STRIP: NEGATIVE
KETONES UR QL STRIP: NEGATIVE
LEUKOCYTE ESTERASE UR QL STRIP: ABNORMAL
MICROSCOPIC COMMENT: ABNORMAL
NITRITE UR QL STRIP: NEGATIVE
PH UR STRIP: 5 [PH] (ref 5–8)
PROT UR QL STRIP: NEGATIVE
RBC #/AREA URNS AUTO: 0 /HPF (ref 0–4)
SP GR UR STRIP: 1.01 (ref 1–1.03)
SQUAMOUS #/AREA URNS AUTO: 0 /HPF
URN SPEC COLLECT METH UR: ABNORMAL
WBC #/AREA URNS AUTO: 2 /HPF (ref 0–5)

## 2022-05-13 LAB — BACTERIA UR CULT: NORMAL

## 2022-05-15 PROBLEM — J30.89 ENVIRONMENTAL AND SEASONAL ALLERGIES: Status: ACTIVE | Noted: 2022-05-15

## 2022-05-15 PROBLEM — D80.3 IGG3 SUBCLASS DEFICIENCY: Status: ACTIVE | Noted: 2022-05-15

## 2022-07-21 ENCOUNTER — OFFICE VISIT (OUTPATIENT)
Dept: FAMILY MEDICINE | Facility: CLINIC | Age: 52
End: 2022-07-21
Payer: COMMERCIAL

## 2022-07-21 VITALS
HEART RATE: 85 BPM | HEIGHT: 67 IN | SYSTOLIC BLOOD PRESSURE: 124 MMHG | DIASTOLIC BLOOD PRESSURE: 74 MMHG | BODY MASS INDEX: 35.29 KG/M2 | WEIGHT: 224.88 LBS | OXYGEN SATURATION: 98 %

## 2022-07-21 DIAGNOSIS — E88.810 METABOLIC SYNDROME: ICD-10-CM

## 2022-07-21 DIAGNOSIS — R73.02 GLUCOSE INTOLERANCE (IMPAIRED GLUCOSE TOLERANCE): ICD-10-CM

## 2022-07-21 DIAGNOSIS — F41.9 ANXIETY: Primary | ICD-10-CM

## 2022-07-21 DIAGNOSIS — Z78.0 MENOPAUSE: ICD-10-CM

## 2022-07-21 DIAGNOSIS — R79.89 ELEVATED TSH: ICD-10-CM

## 2022-07-21 DIAGNOSIS — Z00.00 WELLNESS EXAMINATION: ICD-10-CM

## 2022-07-21 DIAGNOSIS — F48.8 BRAIN FAG: ICD-10-CM

## 2022-07-21 PROCEDURE — 99999 PR PBB SHADOW E&M-EST. PATIENT-LVL IV: ICD-10-PCS | Mod: PBBFAC,,, | Performed by: INTERNAL MEDICINE

## 2022-07-21 PROCEDURE — 3008F BODY MASS INDEX DOCD: CPT | Mod: CPTII,S$GLB,, | Performed by: INTERNAL MEDICINE

## 2022-07-21 PROCEDURE — 1160F PR REVIEW ALL MEDS BY PRESCRIBER/CLIN PHARMACIST DOCUMENTED: ICD-10-PCS | Mod: CPTII,S$GLB,, | Performed by: INTERNAL MEDICINE

## 2022-07-21 PROCEDURE — 99214 PR OFFICE/OUTPT VISIT, EST, LEVL IV, 30-39 MIN: ICD-10-PCS | Mod: S$GLB,,, | Performed by: INTERNAL MEDICINE

## 2022-07-21 PROCEDURE — 1159F MED LIST DOCD IN RCRD: CPT | Mod: CPTII,S$GLB,, | Performed by: INTERNAL MEDICINE

## 2022-07-21 PROCEDURE — 1160F RVW MEDS BY RX/DR IN RCRD: CPT | Mod: CPTII,S$GLB,, | Performed by: INTERNAL MEDICINE

## 2022-07-21 PROCEDURE — 3078F DIAST BP <80 MM HG: CPT | Mod: CPTII,S$GLB,, | Performed by: INTERNAL MEDICINE

## 2022-07-21 PROCEDURE — 1159F PR MEDICATION LIST DOCUMENTED IN MEDICAL RECORD: ICD-10-PCS | Mod: CPTII,S$GLB,, | Performed by: INTERNAL MEDICINE

## 2022-07-21 PROCEDURE — 99214 OFFICE O/P EST MOD 30 MIN: CPT | Mod: S$GLB,,, | Performed by: INTERNAL MEDICINE

## 2022-07-21 PROCEDURE — 3074F SYST BP LT 130 MM HG: CPT | Mod: CPTII,S$GLB,, | Performed by: INTERNAL MEDICINE

## 2022-07-21 PROCEDURE — 3008F PR BODY MASS INDEX (BMI) DOCUMENTED: ICD-10-PCS | Mod: CPTII,S$GLB,, | Performed by: INTERNAL MEDICINE

## 2022-07-21 PROCEDURE — 3078F PR MOST RECENT DIASTOLIC BLOOD PRESSURE < 80 MM HG: ICD-10-PCS | Mod: CPTII,S$GLB,, | Performed by: INTERNAL MEDICINE

## 2022-07-21 PROCEDURE — 3074F PR MOST RECENT SYSTOLIC BLOOD PRESSURE < 130 MM HG: ICD-10-PCS | Mod: CPTII,S$GLB,, | Performed by: INTERNAL MEDICINE

## 2022-07-21 PROCEDURE — 99999 PR PBB SHADOW E&M-EST. PATIENT-LVL IV: CPT | Mod: PBBFAC,,, | Performed by: INTERNAL MEDICINE

## 2022-07-21 RX ORDER — PHENTERMINE HYDROCHLORIDE 37.5 MG/1
37.5 TABLET ORAL DAILY
Qty: 30 TABLET | Refills: 0 | Status: SHIPPED | OUTPATIENT
Start: 2022-07-21 | End: 2022-10-18

## 2022-07-21 RX ORDER — BUSPIRONE HYDROCHLORIDE 5 MG/1
5 TABLET ORAL DAILY
Qty: 30 TABLET | Refills: 1 | Status: SHIPPED | OUTPATIENT
Start: 2022-07-21 | End: 2022-08-25

## 2022-07-21 NOTE — PROGRESS NOTES
Subjective:   .  Get note    Patient ID: Siobhan Richards is a 51 y.o. female.    Gyn:  Dr. Camilo / last menses year ago   MM2021  Dexa:  2020 normal  Colonoscopy:  19 cologaurd negative / will due colon this year   Immunizations: Flu: yes Tdap: check old note Pneumonia: 22 Shingles: no  Covid: yes   Smoker:  Former    Chief Complaint: Follow-up (Medication refill)    Travel anxiety and fear heights. Seems worse w traveling w  this summer and son being home from college.      Anxiety  Presents for initial visit. Episode onset: 1-2 month  Symptoms include excessive worry, nervous/anxious behavior and restlessness. Patient reports no confusion or palpitations.           Anxiety: out of blue mild panic feeling.  Driving anxiety. Not sure why. Prev took wellbutrin doesn't want anything take causes weight gain.          Metbolic syndrome: BMI started 37 maintained now 34. Lbs 218. Just started Adipex few days ago.  Was traveling w summer.   # 1 Rx; 1/2 Adipex and Topamax 100. Tolerating meds. Improved headaches.   Changed diet and  doing weight loss too.    GERD: small HH Rx: Protonix 40   Mild low TSH 3.1 has steadily increased over last few checks.-trail of meds did notice diff w energy, stopped.   Migraines: improved w Topamax 100 mg//ear pain is s/s //  p.r.n. medicines  Leg swelling: Rx prn lasix   HLD: controlled : Rx pravastatin 40.  //,      ADD: off meds currently-wants restart something that doesn't make her tired next day.  prev Adderall 20 and maybe Vyvanse   Vitamin-D deficiency: OTC supplement  Glucose intolerance:  G 91  Never took Rx metformin -trying weight loss first. //   A1C 5.1 & G 107   Allergies:  + tree, grass.- Dr Dominguez recommended shots - hasnt gone back yet. Pnx titers all low -will boost today w vaccine and rc    Review of Systems:  Review of Systems   Constitutional: Negative for activity change, appetite change and unexpected  "weight change.   HENT: Negative for hearing loss, nosebleeds, rhinorrhea and trouble swallowing.    Eyes: Negative for pain, discharge and visual disturbance.   Respiratory: Negative for choking, chest tightness and wheezing.    Cardiovascular: Negative for palpitations.   Gastrointestinal: Negative for blood in stool, constipation and diarrhea.   Endocrine: Negative for polydipsia and polyuria.   Genitourinary: Negative for difficulty urinating, dysuria, hematuria and menstrual problem.   Skin: Negative for pallor.   Neurological: Negative for facial asymmetry.   Hematological: Does not bruise/bleed easily.   Psychiatric/Behavioral: Negative for confusion and dysphoric mood. The patient is nervous/anxious.        Objective:     Vitals:    07/21/22 1409   BP: 124/74   Pulse: 85   SpO2: 98%   Weight: 102 kg (224 lb 13.9 oz)   Height: 5' 7" (1.702 m)          Physical Exam  Vitals reviewed.   Constitutional:       Appearance: Normal appearance.   HENT:      Head: Normocephalic and atraumatic.      Mouth/Throat:      Pharynx: Oropharynx is clear.   Eyes:      Extraocular Movements: Extraocular movements intact.      Conjunctiva/sclera: Conjunctivae normal.      Pupils: Pupils are equal, round, and reactive to light.   Cardiovascular:      Rate and Rhythm: Normal rate and regular rhythm.      Heart sounds: Normal heart sounds.   Pulmonary:      Effort: Pulmonary effort is normal.      Breath sounds: Normal breath sounds.   Abdominal:      General: Bowel sounds are normal.      Palpations: Abdomen is soft.   Musculoskeletal:         General: Normal range of motion.      Cervical back: Normal range of motion and neck supple.   Skin:     General: Skin is warm and dry.   Neurological:      General: No focal deficit present.      Mental Status: She is alert and oriented to person, place, and time.   Psychiatric:         Mood and Affect: Mood normal.         Medication List with Changes/Refills   New Medications    BUSPIRONE " (BUSPAR) 5 MG TAB    Take 1 tablet (5 mg total) by mouth once daily. Prn anxiety   Current Medications    ASPIRIN-ACETAMINOPHEN-CAFFEINE 250-250-65 MG (EXCEDRIN MIGRAINE) 250-250-65 MG PER TABLET    Excedrin Migraine   prn    COENZYME Q10 200 MG CAPSULE        ELETRIPTAN (RELPAX) 40 MG TABLET    Take 1 tablet (40 mg total) by mouth as needed (migraine). may repeat in 2 hours if necessary. Max is 2/day.    ERGOCALCIFEROL (ERGOCALCIFEROL) 50,000 UNIT CAP    TAKE 1 CAPSULE BY MOUTH ONE TIME PER WEEK    FLUTICASONE PROPIONATE (FLONASE) 50 MCG/ACTUATION NASAL SPRAY    1 spray (50 mcg total) by Each Nostril route 2 (two) times daily.    FUROSEMIDE (LASIX) 20 MG TABLET    TAKE 1 TABLET ONCE A DAY AS NEEDED SWELLING    LEVOTHYROXINE (SYNTHROID) 25 MCG TABLET    TAKE 1 TABLET BY MOUTH BEFORE BREAKFAST.    METHYLPHENIDATE HCL (RITALIN) 20 MG TABLET    Take 1 tablet (20 mg total) by mouth once daily.    MULTIVITAMIN (DAILY VITAMIN ORAL)        NEOMYCIN-POLYMYXIN-HYDROCORTISONE (CORTISPORIN) 3.5-10,000-1 MG/ML-UNIT/ML-% OTIC SUSPENSION    Place 3 drops into the right ear 3 (three) times daily.    ONDANSETRON (ZOFRAN-ODT) 4 MG TBDL    Take 1 tablet (4 mg total) by mouth every 8 (eight) hours as needed (nausea).    PANTOPRAZOLE (PROTONIX) 40 MG TABLET    TAKE 1 TABLET BY MOUTH EVERY DAY    PRAVASTATIN (PRAVACHOL) 40 MG TABLET    Take 1 tablet (40 mg total) by mouth once daily.    PROMETHAZINE (PHENERGAN) 25 MG TABLET    Take 1 tablet (25 mg total) by mouth every 6 (six) hours as needed for Nausea.    TOPIRAMATE (TOPAMAX) 50 MG TABLET    TAKE 1 TABLET BY MOUTH TWICE A DAY    TURMERIC ORAL    Take by mouth.    VITAMIN E ACETATE (VITAMIN E ORAL)    Take by mouth.   Changed and/or Refilled Medications    Modified Medication Previous Medication    PHENTERMINE (ADIPEX-P) 37.5 MG TABLET phentermine (ADIPEX-P) 37.5 mg tablet       Take 1 tablet (37.5 mg total) by mouth once daily.    Take 1 tablet (37.5 mg total) by mouth once daily.        Assessment & Plan:  1. Anxiety    2. Elevated TSH  - TSH; Future  - T4, Free; Future    3. Menopause  - Follicle Stimulating Hormone; Future  - Luteinizing Hormone; Future  - Estradiol; Future  - Progesterone; Future  - TESTOSTERONE, FREE (DIALYSIS) AND TOTAL, LC/MS/MS; Future    4. Metabolic syndrome    5. Brain fag    6. Wellness examination    7. Glucose intolerance (impaired glucose tolerance)     Anxiety    Elevated TSH  -     TSH; Future; Expected date: 07/21/2022  -     T4, Free; Future; Expected date: 07/21/2022    Menopause  -     Follicle Stimulating Hormone; Future; Expected date: 07/21/2022  -     Luteinizing Hormone; Future; Expected date: 07/21/2022  -     Estradiol; Future; Expected date: 07/21/2022  -     Progesterone; Future; Expected date: 07/21/2022  -     TESTOSTERONE, FREE (DIALYSIS) AND TOTAL, LC/MS/MS; Future; Expected date: 07/21/2022    Metabolic syndrome    Brain fag    Wellness examination    Glucose intolerance (impaired glucose tolerance)    Other orders  -     busPIRone (BUSPAR) 5 MG Tab; Take 1 tablet (5 mg total) by mouth once daily. Prn anxiety  Dispense: 30 tablet; Refill: 1  -     phentermine (ADIPEX-P) 37.5 mg tablet; Take 1 tablet (37.5 mg total) by mouth once daily.  Dispense: 30 tablet; Refill: 0        Continue to work on regular exercise, maintain healthy weight, balanced diet. Avoid unhealthy habits: smoking, excessive alcohol intake.

## 2022-08-01 ENCOUNTER — PATIENT MESSAGE (OUTPATIENT)
Dept: FAMILY MEDICINE | Facility: CLINIC | Age: 52
End: 2022-08-01
Payer: COMMERCIAL

## 2022-08-15 DIAGNOSIS — H91.90 HEARING DIFFICULTY, UNSPECIFIED LATERALITY: Primary | ICD-10-CM

## 2022-10-06 ENCOUNTER — PATIENT MESSAGE (OUTPATIENT)
Dept: FAMILY MEDICINE | Facility: CLINIC | Age: 52
End: 2022-10-06
Payer: COMMERCIAL

## 2022-10-11 ENCOUNTER — OFFICE VISIT (OUTPATIENT)
Dept: OTOLARYNGOLOGY | Facility: CLINIC | Age: 52
End: 2022-10-11
Payer: COMMERCIAL

## 2022-10-11 VITALS — HEIGHT: 67 IN | BODY MASS INDEX: 35.29 KG/M2 | WEIGHT: 224.88 LBS | TEMPERATURE: 99 F

## 2022-10-11 DIAGNOSIS — H93.8X1 SENSATION OF FULLNESS IN RIGHT EAR: Primary | ICD-10-CM

## 2022-10-11 DIAGNOSIS — H92.01 REFERRED OTALGIA OF RIGHT EAR: ICD-10-CM

## 2022-10-11 PROCEDURE — 99999 PR PBB SHADOW E&M-EST. PATIENT-LVL IV: ICD-10-PCS | Mod: PBBFAC,,, | Performed by: NURSE PRACTITIONER

## 2022-10-11 PROCEDURE — 3008F PR BODY MASS INDEX (BMI) DOCUMENTED: ICD-10-PCS | Mod: CPTII,S$GLB,, | Performed by: NURSE PRACTITIONER

## 2022-10-11 PROCEDURE — 99213 PR OFFICE/OUTPT VISIT, EST, LEVL III, 20-29 MIN: ICD-10-PCS | Mod: S$GLB,,, | Performed by: NURSE PRACTITIONER

## 2022-10-11 PROCEDURE — 99213 OFFICE O/P EST LOW 20 MIN: CPT | Mod: S$GLB,,, | Performed by: NURSE PRACTITIONER

## 2022-10-11 PROCEDURE — 99999 PR PBB SHADOW E&M-EST. PATIENT-LVL IV: CPT | Mod: PBBFAC,,, | Performed by: NURSE PRACTITIONER

## 2022-10-11 PROCEDURE — 1159F MED LIST DOCD IN RCRD: CPT | Mod: CPTII,S$GLB,, | Performed by: NURSE PRACTITIONER

## 2022-10-11 PROCEDURE — 1159F PR MEDICATION LIST DOCUMENTED IN MEDICAL RECORD: ICD-10-PCS | Mod: CPTII,S$GLB,, | Performed by: NURSE PRACTITIONER

## 2022-10-11 PROCEDURE — 3008F BODY MASS INDEX DOCD: CPT | Mod: CPTII,S$GLB,, | Performed by: NURSE PRACTITIONER

## 2022-10-11 NOTE — PROGRESS NOTES
Subjective:       Patient ID: Siobhan Richards is a 52 y.o. female.    Chief Complaint: No chief complaint on file.    Otalgia   Associated symptoms include headaches.      Patient last saw me 10 months ago for allergic rhinitis, ETD, migrainous dysequilibrium. Sinus imaging has been negative in the past. VNG showed normal peripheral function with indication of central involvement. Audiogram WNL AU. Patient was referred to neurology. Patient returns today for right aural fullness with intermittent brief sudden pains. (+)Bruxism. She had a mouth guard but her dog ate it. She is under a lot of stress. She denies any nasosinal or allergy symptoms.     Review of Systems   Constitutional: Negative.    HENT:  Positive for ear pain and sinus pressure.    Eyes: Negative.    Respiratory: Negative.     Cardiovascular: Negative.    Gastrointestinal: Negative.    Endocrine: Negative.    Genitourinary: Negative.    Musculoskeletal: Negative.    Skin: Negative.    Allergic/Immunologic: Negative.    Neurological:  Positive for headaches.   Hematological: Negative.    Psychiatric/Behavioral: Negative.       Objective:      Physical Exam  Vitals and nursing note reviewed.   Constitutional:       General: She is not in acute distress.     Appearance: She is well-developed. She is not ill-appearing or diaphoretic.   HENT:      Head: Normocephalic and atraumatic.      Right Ear: Hearing, tympanic membrane, ear canal and external ear normal. No middle ear effusion. Tympanic membrane is not erythematous.      Left Ear: Hearing, tympanic membrane, ear canal and external ear normal.  No middle ear effusion. Tympanic membrane is not erythematous.      Nose: Nose normal. No mucosal edema or rhinorrhea.      Mouth/Throat:      Mouth: Mucous membranes are not pale, not dry and not cyanotic. No oral lesions.      Pharynx: Uvula midline. No oropharyngeal exudate or posterior oropharyngeal erythema.   Eyes:      General: Lids are  normal. No scleral icterus.        Right eye: No discharge.         Left eye: No discharge.   Neck:      Thyroid: No thyroid mass or thyromegaly.      Trachea: Trachea normal. No tracheal deviation.   Cardiovascular:      Rate and Rhythm: Normal rate.   Pulmonary:      Effort: Pulmonary effort is normal. No respiratory distress.      Breath sounds: No stridor. No wheezing.   Musculoskeletal:         General: Normal range of motion.      Cervical back: Normal range of motion and neck supple.   Lymphadenopathy:      Head:      Right side of head: No submental, submandibular, tonsillar, preauricular or posterior auricular adenopathy.      Left side of head: No submental, submandibular, tonsillar, preauricular or posterior auricular adenopathy.      Cervical: No cervical adenopathy.      Right cervical: No superficial or posterior cervical adenopathy.     Left cervical: No superficial or posterior cervical adenopathy.   Skin:     General: Skin is warm and dry.      Coloration: Skin is not pale.      Findings: No lesion or rash.   Neurological:      Mental Status: She is alert and oriented to person, place, and time.      Coordination: Coordination normal.      Gait: Gait normal.   Psychiatric:         Speech: Speech normal.         Behavior: Behavior normal. Behavior is cooperative.         Thought Content: Thought content normal.         Judgment: Judgment normal.       Assessment:     Negative aural exam    TMJ Syndrome  Plan:     Reassured negative aural exam. Discussed TMJ as most likely etiology.   Flying precautions for ears given.   Patient encouraged to return to clinic if symptoms worsen/persist and as needed for further ENT symptoms or concerns.         Answers for HPI/ROS submitted by the patient on 12/7/2021  Ear infection(s)?: Yes  Seasonal Allergies?: Yes      Answers submitted by the patient for this visit:  Review of Symptoms Questionnaire  (Submitted on 10/11/2022)

## 2022-10-11 NOTE — PATIENT INSTRUCTIONS
"When flying:  Take Sudafed 60 mg 1 hour prior to flying. Afrin nasal spray 30 minutes before take-off. Chew gum/pop ears gently when taking off and coming down. Drink water. Use "Airplanes" or [employer]'s "Flight Mates" ear plugs designed for flying (available on [employer]).     EUSTACHIAN TUBE INSTRUCTIONS:  Nasal valsalva:  Pinch nose and with closed mouth try to "pop" air into ears through the back of the nose. Attempt this several times a day. The more popping you have, the more likely the fluid will resolve.     Flonase / fluticasone (steroid spray) is best for stuffy, pressure, fullness.  Astelin / azelastine (antihistamine spray) is best for itchy, drippy, sneezy.    Use as directed, spraying 1-2 times in each nostril each day. It may take 2-3 days to 2-3 weeks to begin seeing improvement. This medication needs to be taken consistently to see results. Overall, this is a well-tolerated medication with low side effects. The benefit of nasal steroids as opposed to oral steroids is that the nasal steroid spray works primarily in the nose. Common side effects can include: headache, nasal dryness, minor nose bleed.  Rare side effects may include:  septal perforation, elevation in eye pressure, dry eyes, change in smell, allergic reaction.  Notify your provider if you have any concerns or experience these symptoms.     Nasal spray instructions:  Blow nose first gently to clean. Keep chin level with the floor (do not tilt head forward or back). Insert nasal spray taking caution to direct it AWAY from the middle wall inside the nose (septum) to avoid irritating nasal septum which could cause nosebleed.  Do not tilt spray up but rather flat and out along the roof of your mouth to spray. Angle the tip of the spray out slightly toward the direction of the ears; then spray. Do not take quick vigorous sniff but rather slow gentle inhalation while waiting for medication to absorb into nasal passages. Then administer second spray " in same way.       TMJ SYNDROME     This is a condition with chronic or recurrent pain in the joint of the jaw (in front of the ear). The pain may cause limited motion of the jaw, a locking or catching sensation, clicking, popping or grinding sounds from the joint with movement. It may also lead to headache, earache or neck pain. It is sometimes caused by inflammation in the joint, injury or wear-and-tear of the cartilage in the joint, involuntary grinding of the teeth or poorly fitting dentures. Emotional stress and tension are often a factor. Most cases resolve completely within a few months with proper treatment.     HOME CARE:   1) Rest the jaw by avoiding crunchy or hard foods to chew. Do not eat hard or sticky candies. Soft foods and liquids are easier on the jaw. Protect your jaw while yawning.   2) Hot packs (small towel soaked in hot water) applied to the jaw may give relief by reducing muscle spasm. You may use a heating pad or a towel soaked in hot water. Some people get relief with cold packs, so try both and see which one works best for you.   3) You may use acetaminophen (Tylenol) or ibuprofen (Motrin, Advil) to control pain, unless another medicine was prescribed. [ NOTE : If you have chronic liver or kidney disease or ever had a stomach ulcer or GI bleeding, talk with your doctor before using these medicines.]   4) If you suspect emotional stress is related to your condition.   a) Try to identify the sources of stress in your life. It may not be obvious! These may include:   -- Daily hassles of life that pile up (traffic jams, missed appointments, car troubles)   -- Major life changes, both good (new baby, job promotion) and bad (loss of job, loss of loved one)   -- Overload: feeling that you have too many responsibilities and can't take care of everything at once   -- Helplessness: feeling like your problems are more than you can solve   b) When possible, do something about the source of your  "stress: avoid hassles, limit the amount of change that is happening in your life at one time and take a break when you feel overloaded.   c) Unfortunately, many stressful situations cannot be avoided. Therefore, it is necessary to learn HOW TO MANAGE STRESS better. There are many proven methods that work and will reduce your anxiety. These include simple things like exercise, good nutrition and adequate rest. Also, there are certain techniques that are helpful: relaxation and breathing exercises, visualization, biofeedback, meditation or simply taking some time-out to clear your mind. For more information about this, consult your doctor or go to a local bookstore and review the many books and tapes available on this subject.     FOLLOW-UP as directed with a dentist or oral surgeon. Further testing and additional treatment may be required. If you grind your teeth at night, a custom-made "bite guard" may help you. If stress is an important factor and does not respond to the above simple measures, talk to your doctor about a referral for stress management.     GET PROMPT MEDICAL ATTENTION if any of the following occur:   -- Your face becomes swollen or red   -- Pain worsens   -- 100.0°F (37.8°C)  -- Increasing neck, mouth, tooth or throat pain    SmartGuard Elite   "

## 2022-10-18 ENCOUNTER — OFFICE VISIT (OUTPATIENT)
Dept: FAMILY MEDICINE | Facility: CLINIC | Age: 52
End: 2022-10-18
Payer: COMMERCIAL

## 2022-10-18 VITALS
HEIGHT: 67 IN | SYSTOLIC BLOOD PRESSURE: 120 MMHG | WEIGHT: 219.56 LBS | RESPIRATION RATE: 18 BRPM | BODY MASS INDEX: 34.46 KG/M2 | DIASTOLIC BLOOD PRESSURE: 80 MMHG

## 2022-10-18 DIAGNOSIS — R79.89 ELEVATED TSH: ICD-10-CM

## 2022-10-18 DIAGNOSIS — E88.810 METABOLIC SYNDROME: ICD-10-CM

## 2022-10-18 DIAGNOSIS — R73.02 GLUCOSE INTOLERANCE (IMPAIRED GLUCOSE TOLERANCE): ICD-10-CM

## 2022-10-18 DIAGNOSIS — R05.9 COUGH, UNSPECIFIED TYPE: Primary | ICD-10-CM

## 2022-10-18 DIAGNOSIS — J10.1 INFLUENZA A: ICD-10-CM

## 2022-10-18 DIAGNOSIS — Z01.89 ENCOUNTER FOR ROUTINE LABORATORY TESTING: ICD-10-CM

## 2022-10-18 DIAGNOSIS — Z11.59 NEED FOR HEPATITIS C SCREENING TEST: ICD-10-CM

## 2022-10-18 DIAGNOSIS — E66.01 SEVERE OBESITY (BMI 35.0-39.9) WITH COMORBIDITY: ICD-10-CM

## 2022-10-18 PROCEDURE — 1159F MED LIST DOCD IN RCRD: CPT | Mod: CPTII,S$GLB,, | Performed by: INTERNAL MEDICINE

## 2022-10-18 PROCEDURE — 3074F PR MOST RECENT SYSTOLIC BLOOD PRESSURE < 130 MM HG: ICD-10-PCS | Mod: CPTII,S$GLB,, | Performed by: INTERNAL MEDICINE

## 2022-10-18 PROCEDURE — 99214 OFFICE O/P EST MOD 30 MIN: CPT | Mod: S$GLB,,, | Performed by: INTERNAL MEDICINE

## 2022-10-18 PROCEDURE — 3079F PR MOST RECENT DIASTOLIC BLOOD PRESSURE 80-89 MM HG: ICD-10-PCS | Mod: CPTII,S$GLB,, | Performed by: INTERNAL MEDICINE

## 2022-10-18 PROCEDURE — 1160F RVW MEDS BY RX/DR IN RCRD: CPT | Mod: CPTII,S$GLB,, | Performed by: INTERNAL MEDICINE

## 2022-10-18 PROCEDURE — 1160F PR REVIEW ALL MEDS BY PRESCRIBER/CLIN PHARMACIST DOCUMENTED: ICD-10-PCS | Mod: CPTII,S$GLB,, | Performed by: INTERNAL MEDICINE

## 2022-10-18 PROCEDURE — 1159F PR MEDICATION LIST DOCUMENTED IN MEDICAL RECORD: ICD-10-PCS | Mod: CPTII,S$GLB,, | Performed by: INTERNAL MEDICINE

## 2022-10-18 PROCEDURE — 99214 PR OFFICE/OUTPT VISIT, EST, LEVL IV, 30-39 MIN: ICD-10-PCS | Mod: S$GLB,,, | Performed by: INTERNAL MEDICINE

## 2022-10-18 PROCEDURE — 3074F SYST BP LT 130 MM HG: CPT | Mod: CPTII,S$GLB,, | Performed by: INTERNAL MEDICINE

## 2022-10-18 PROCEDURE — 99999 PR PBB SHADOW E&M-EST. PATIENT-LVL IV: ICD-10-PCS | Mod: PBBFAC,,, | Performed by: INTERNAL MEDICINE

## 2022-10-18 PROCEDURE — 99999 PR PBB SHADOW E&M-EST. PATIENT-LVL IV: CPT | Mod: PBBFAC,,, | Performed by: INTERNAL MEDICINE

## 2022-10-18 PROCEDURE — 3079F DIAST BP 80-89 MM HG: CPT | Mod: CPTII,S$GLB,, | Performed by: INTERNAL MEDICINE

## 2022-10-18 RX ORDER — PREDNISONE 10 MG/1
10 TABLET ORAL DAILY
Qty: 5 TABLET | Refills: 0 | Status: SHIPPED | OUTPATIENT
Start: 2022-10-18 | End: 2022-11-09

## 2022-10-18 RX ORDER — HYDROCODONE POLISTIREX AND CHLORPHENIRAMINE POLISTIREX 10; 8 MG/5ML; MG/5ML
5 SUSPENSION, EXTENDED RELEASE ORAL EVERY 12 HOURS PRN
Qty: 70 ML | Refills: 0 | Status: SHIPPED | OUTPATIENT
Start: 2022-10-18 | End: 2022-11-09

## 2022-10-18 RX ORDER — TIRZEPATIDE 2.5 MG/.5ML
2.5 INJECTION, SOLUTION SUBCUTANEOUS
Qty: 4 PEN | Refills: 0 | Status: SHIPPED | OUTPATIENT
Start: 2022-10-18 | End: 2022-11-09 | Stop reason: SDUPTHER

## 2022-10-18 RX ORDER — BENZONATATE 200 MG/1
200 CAPSULE ORAL 2 TIMES DAILY PRN
Qty: 30 CAPSULE | Refills: 0 | Status: SHIPPED | OUTPATIENT
Start: 2022-10-18

## 2022-10-18 NOTE — PROGRESS NOTES
Subjective:   .  Get note    Patient ID: Siobhan Richards is a 52 y.o. female.    Gyn:  Dr. Camilo / last menses year ago   MM2021  Dexa:  2020 normal  Colonoscopy:  19 cologaurd negative / will due colon this year   Immunizations:  Tdap: check old note Pneumonia:  Shingles: no  Covid: yes   Smoker:  Former    Chief Complaint: Follow-up (Flu f/u, no fever, cough/congestion- since friday)    Dx w flu 10/15 with flu.  Went to  Sat w fever, body aches.  Hasnt had this years flu shot.   Given prophylactic flu treatment.  Did not take  Complains of continue cough keeping her up at night, mild wheezing last night.  No shortness of breath, fever has resolved, still having mild body aches but feeling much better.    Deep breath will cause cough during the day.  Cough is deep but clear.  Having clear sinus congestion, pressure.     Taking over-the-counter Mucinex DM, Sudafed.       Cough  This is a new problem. The current episode started in the past 7 days. The problem occurs every few hours. The cough is Non-productive. Associated symptoms include ear congestion and nasal congestion. The symptoms are aggravated by lying down. Her past medical history is significant for bronchitis.     Anxiety: out of blue mild panic feeling.  Driving anxiety. Rx  prn Buspar.  Prev took wellbutrin doesn't want anything take causes weight gain.     Glucose intolerance:  G 91  Never took Rx metformin -trying weight loss first. Interested in new shots  //   A1C 5.1 & G 107      Metbolic syndrome: BMI started 37 @ 34 maintaining 219 lbs. Prev Rx Adipex. &  Topamax 100. Improved headaches.    GERD: small HH Rx: Protonix 40   Mild low TSH 3.1 has steadily increased over last few checks.-trail of meds did notice diff w energy, stopped.   Migraines: Rx Topamax 100 mg//ear pain is s/s //  p.r.n. medicines  Leg swelling: Rx prn lasix   Mixed HLD: controlled : Rx pravastatin 40.    ADD: off meds currently-wants  "restart something that doesn't make her tired next day.  prev Adderall 20 and maybe Vyvanse   Vitamin-D deficiency: OTC supplement    Allergies:  + tree, grass.- Dr Dominguez recommended shots - hasnt gone back yet. Pnx titers all low -will boost today w vaccine and rc    Review of Systems:  Review of Systems    Objective:     Vitals:    10/18/22 1223   BP: 120/80   Resp: 18   Weight: 99.6 kg (219 lb 9.3 oz)   Height: 5' 7" (1.702 m)          Physical Exam  Vitals reviewed.   Constitutional:       Appearance: Normal appearance.   HENT:      Head: Normocephalic and atraumatic.      Mouth/Throat:      Pharynx: Oropharynx is clear.   Eyes:      Extraocular Movements: Extraocular movements intact.      Conjunctiva/sclera: Conjunctivae normal.      Pupils: Pupils are equal, round, and reactive to light.   Cardiovascular:      Rate and Rhythm: Regular rhythm.      Heart sounds: Normal heart sounds.   Pulmonary:      Effort: Pulmonary effort is normal.      Breath sounds: Normal breath sounds.   Abdominal:      General: Bowel sounds are normal.      Palpations: Abdomen is soft.   Musculoskeletal:         General: Normal range of motion.      Cervical back: Normal range of motion and neck supple.   Skin:     General: Skin is warm and dry.   Neurological:      General: No focal deficit present.      Mental Status: She is alert and oriented to person, place, and time.   Psychiatric:         Mood and Affect: Mood normal.       Medication List with Changes/Refills   New Medications    BENZONATATE (TESSALON) 200 MG CAPSULE    Take 1 capsule (200 mg total) by mouth 2 (two) times daily as needed for Cough.    HYDROCODONE-CHLORPHENIRAMINE (TUSSIONEX) 10-8 MG/5 ML SUSPENSION    Take 5 mLs by mouth every 12 (twelve) hours as needed for Cough.    PREDNISONE (DELTASONE) 10 MG TABLET    Take 1 tablet (10 mg total) by mouth once daily.    TIRZEPATIDE (MOUNJARO) 2.5 MG/0.5 ML PNIJ    Inject 2.5 mg into the skin every 7 days. Call office for " refill   Current Medications    ASPIRIN-ACETAMINOPHEN-CAFFEINE 250-250-65 MG (EXCEDRIN MIGRAINE) 250-250-65 MG PER TABLET    Excedrin Migraine   prn    BUSPIRONE (BUSPAR) 5 MG TAB    TAKE 1 TABLET (5 MG TOTAL) BY MOUTH ONCE DAY AS NEEDED FOR ANXIETY    COENZYME Q10 200 MG CAPSULE        ELETRIPTAN (RELPAX) 40 MG TABLET    Take 1 tablet (40 mg total) by mouth as needed (migraine). may repeat in 2 hours if necessary. Max is 2/day.    ERGOCALCIFEROL (ERGOCALCIFEROL) 50,000 UNIT CAP    TAKE 1 CAPSULE BY MOUTH ONE TIME PER WEEK    FLUTICASONE PROPIONATE (FLONASE) 50 MCG/ACTUATION NASAL SPRAY    1 spray (50 mcg total) by Each Nostril route 2 (two) times daily.    FUROSEMIDE (LASIX) 20 MG TABLET    TAKE 1 TABLET ONCE A DAY AS NEEDED SWELLING    LEVOTHYROXINE (SYNTHROID) 25 MCG TABLET    TAKE 1 TABLET BY MOUTH BEFORE BREAKFAST.    METHYLPHENIDATE HCL (RITALIN) 20 MG TABLET    Take 1 tablet (20 mg total) by mouth once daily.    MULTIVITAMIN (DAILY VITAMIN ORAL)        NEOMYCIN-POLYMYXIN-HYDROCORTISONE (CORTISPORIN) 3.5-10,000-1 MG/ML-UNIT/ML-% OTIC SUSPENSION    Place 3 drops into the right ear 3 (three) times daily.    ONDANSETRON (ZOFRAN-ODT) 4 MG TBDL    Take 1 tablet (4 mg total) by mouth every 8 (eight) hours as needed (nausea).    PANTOPRAZOLE (PROTONIX) 40 MG TABLET    TAKE 1 TABLET BY MOUTH EVERY DAY    PHENTERMINE (ADIPEX-P) 37.5 MG TABLET    Take 1 tablet (37.5 mg total) by mouth once daily.    PRAVASTATIN (PRAVACHOL) 40 MG TABLET    Take 1 tablet (40 mg total) by mouth once daily.    PROMETHAZINE (PHENERGAN) 25 MG TABLET    Take 1 tablet (25 mg total) by mouth every 6 (six) hours as needed for Nausea.    TOPIRAMATE (TOPAMAX) 50 MG TABLET    TAKE 1 TABLET BY MOUTH TWICE A DAY    TURMERIC ORAL    Take by mouth.    VITAMIN E ACETATE (VITAMIN E ORAL)    Take by mouth.       Assessment & Plan:  1. Severe obesity (BMI 35.0-39.9) with comorbidity    2. Influenza A  - hydrocodone-chlorpheniramine (TUSSIONEX) 10-8 mg/5 mL  suspension; Take 5 mLs by mouth every 12 (twelve) hours as needed for Cough.  Dispense: 70 mL; Refill: 0    3. Cough, unspecified type  - hydrocodone-chlorpheniramine (TUSSIONEX) 10-8 mg/5 mL suspension; Take 5 mLs by mouth every 12 (twelve) hours as needed for Cough.  Dispense: 70 mL; Refill: 0    4. Metabolic syndrome  - tirzepatide (MOUNJARO) 2.5 mg/0.5 mL PnIj; Inject 2.5 mg into the skin every 7 days. Call office for refill  Dispense: 4 pen; Refill: 0    5. Obesity (BMI 30-39.9)  - tirzepatide (MOUNJARO) 2.5 mg/0.5 mL PnIj; Inject 2.5 mg into the skin every 7 days. Call office for refill  Dispense: 4 pen; Refill: 0    6. Encounter for routine laboratory testing  - CBC Without Differential; Future  - Comprehensive Metabolic Panel; Future  - Hemoglobin A1C; Future  - Lipid Panel; Future  - TSH; Future  - Urinalysis; Future  - Hepatitis C Antibody; Future    7. Elevated TSH    8. Glucose intolerance (impaired glucose tolerance)  - Hemoglobin A1C; Future    9. Need for hepatitis C screening test  - Hepatitis C Antibody; Future     Severe obesity (BMI 35.0-39.9) with comorbidity    Influenza A  -     hydrocodone-chlorpheniramine (TUSSIONEX) 10-8 mg/5 mL suspension; Take 5 mLs by mouth every 12 (twelve) hours as needed for Cough.  Dispense: 70 mL; Refill: 0    Cough, unspecified type  -     hydrocodone-chlorpheniramine (TUSSIONEX) 10-8 mg/5 mL suspension; Take 5 mLs by mouth every 12 (twelve) hours as needed for Cough.  Dispense: 70 mL; Refill: 0    Metabolic syndrome  -     tirzepatide (MOUNJARO) 2.5 mg/0.5 mL PnIj; Inject 2.5 mg into the skin every 7 days. Call office for refill  Dispense: 4 pen; Refill: 0    Obesity (BMI 30-39.9)  -     tirzepatide (MOUNJARO) 2.5 mg/0.5 mL PnIj; Inject 2.5 mg into the skin every 7 days. Call office for refill  Dispense: 4 pen; Refill: 0    Encounter for routine laboratory testing  -     CBC Without Differential; Future; Expected date: 10/18/2022  -     Comprehensive Metabolic  Panel; Future; Expected date: 10/18/2022  -     Hemoglobin A1C; Future; Expected date: 10/18/2022  -     Lipid Panel; Future; Expected date: 10/18/2022  -     TSH; Future; Expected date: 10/18/2022  -     Urinalysis; Future; Expected date: 10/18/2022  -     Hepatitis C Antibody; Future; Expected date: 10/18/2022    Elevated TSH    Glucose intolerance (impaired glucose tolerance)  -     Hemoglobin A1C; Future; Expected date: 10/18/2022    Need for hepatitis C screening test  -     Hepatitis C Antibody; Future; Expected date: 10/18/2022    Other orders  -     predniSONE (DELTASONE) 10 MG tablet; Take 1 tablet (10 mg total) by mouth once daily.  Dispense: 5 tablet; Refill: 0  -     benzonatate (TESSALON) 200 MG capsule; Take 1 capsule (200 mg total) by mouth 2 (two) times daily as needed for Cough.  Dispense: 30 capsule; Refill: 0      Continue to work on regular exercise, maintain healthy weight, balanced diet. Avoid unhealthy habits: smoking, excessive alcohol intake.

## 2022-10-19 ENCOUNTER — PATIENT MESSAGE (OUTPATIENT)
Dept: FAMILY MEDICINE | Facility: CLINIC | Age: 52
End: 2022-10-19
Payer: COMMERCIAL

## 2022-11-06 ENCOUNTER — PATIENT MESSAGE (OUTPATIENT)
Dept: FAMILY MEDICINE | Facility: CLINIC | Age: 52
End: 2022-11-06
Payer: COMMERCIAL

## 2022-11-09 ENCOUNTER — OFFICE VISIT (OUTPATIENT)
Dept: FAMILY MEDICINE | Facility: CLINIC | Age: 52
End: 2022-11-09
Payer: COMMERCIAL

## 2022-11-09 VITALS
BODY MASS INDEX: 33.72 KG/M2 | HEIGHT: 67 IN | SYSTOLIC BLOOD PRESSURE: 102 MMHG | WEIGHT: 214.81 LBS | DIASTOLIC BLOOD PRESSURE: 74 MMHG

## 2022-11-09 DIAGNOSIS — E88.810 METABOLIC SYNDROME: ICD-10-CM

## 2022-11-09 DIAGNOSIS — K58.0 IRRITABLE BOWEL SYNDROME WITH DIARRHEA: ICD-10-CM

## 2022-11-09 DIAGNOSIS — Z00.00 WELLNESS EXAMINATION: Primary | ICD-10-CM

## 2022-11-09 DIAGNOSIS — E66.9 OBESITY (BMI 30-39.9): ICD-10-CM

## 2022-11-09 DIAGNOSIS — R10.9 STOMACH CRAMPS: ICD-10-CM

## 2022-11-09 DIAGNOSIS — G43.909 MIGRAINE WITHOUT STATUS MIGRAINOSUS, NOT INTRACTABLE, UNSPECIFIED MIGRAINE TYPE: ICD-10-CM

## 2022-11-09 DIAGNOSIS — R73.02 GLUCOSE INTOLERANCE (IMPAIRED GLUCOSE TOLERANCE): ICD-10-CM

## 2022-11-09 PROCEDURE — 3044F HG A1C LEVEL LT 7.0%: CPT | Mod: CPTII,S$GLB,, | Performed by: INTERNAL MEDICINE

## 2022-11-09 PROCEDURE — 99999 PR PBB SHADOW E&M-EST. PATIENT-LVL III: CPT | Mod: PBBFAC,,, | Performed by: INTERNAL MEDICINE

## 2022-11-09 PROCEDURE — 3074F PR MOST RECENT SYSTOLIC BLOOD PRESSURE < 130 MM HG: ICD-10-PCS | Mod: CPTII,S$GLB,, | Performed by: INTERNAL MEDICINE

## 2022-11-09 PROCEDURE — 1159F MED LIST DOCD IN RCRD: CPT | Mod: CPTII,S$GLB,, | Performed by: INTERNAL MEDICINE

## 2022-11-09 PROCEDURE — 3044F PR MOST RECENT HEMOGLOBIN A1C LEVEL <7.0%: ICD-10-PCS | Mod: CPTII,S$GLB,, | Performed by: INTERNAL MEDICINE

## 2022-11-09 PROCEDURE — 3078F DIAST BP <80 MM HG: CPT | Mod: CPTII,S$GLB,, | Performed by: INTERNAL MEDICINE

## 2022-11-09 PROCEDURE — 99396 PR PREVENTIVE VISIT,EST,40-64: ICD-10-PCS | Mod: S$GLB,,, | Performed by: INTERNAL MEDICINE

## 2022-11-09 PROCEDURE — 3074F SYST BP LT 130 MM HG: CPT | Mod: CPTII,S$GLB,, | Performed by: INTERNAL MEDICINE

## 2022-11-09 PROCEDURE — 99999 PR PBB SHADOW E&M-EST. PATIENT-LVL III: ICD-10-PCS | Mod: PBBFAC,,, | Performed by: INTERNAL MEDICINE

## 2022-11-09 PROCEDURE — 3008F BODY MASS INDEX DOCD: CPT | Mod: CPTII,S$GLB,, | Performed by: INTERNAL MEDICINE

## 2022-11-09 PROCEDURE — 99396 PREV VISIT EST AGE 40-64: CPT | Mod: S$GLB,,, | Performed by: INTERNAL MEDICINE

## 2022-11-09 PROCEDURE — 3008F PR BODY MASS INDEX (BMI) DOCUMENTED: ICD-10-PCS | Mod: CPTII,S$GLB,, | Performed by: INTERNAL MEDICINE

## 2022-11-09 PROCEDURE — 1159F PR MEDICATION LIST DOCUMENTED IN MEDICAL RECORD: ICD-10-PCS | Mod: CPTII,S$GLB,, | Performed by: INTERNAL MEDICINE

## 2022-11-09 PROCEDURE — 3078F PR MOST RECENT DIASTOLIC BLOOD PRESSURE < 80 MM HG: ICD-10-PCS | Mod: CPTII,S$GLB,, | Performed by: INTERNAL MEDICINE

## 2022-11-09 PROCEDURE — 1160F PR REVIEW ALL MEDS BY PRESCRIBER/CLIN PHARMACIST DOCUMENTED: ICD-10-PCS | Mod: CPTII,S$GLB,, | Performed by: INTERNAL MEDICINE

## 2022-11-09 PROCEDURE — 1160F RVW MEDS BY RX/DR IN RCRD: CPT | Mod: CPTII,S$GLB,, | Performed by: INTERNAL MEDICINE

## 2022-11-09 RX ORDER — COLESEVELAM 180 1/1
625 TABLET ORAL 2 TIMES DAILY WITH MEALS
Qty: 60 TABLET | Refills: 1 | Status: SHIPPED | OUTPATIENT
Start: 2022-11-09 | End: 2023-11-09

## 2022-11-09 RX ORDER — PHENTERMINE HYDROCHLORIDE 37.5 MG/1
37.5 TABLET ORAL
Qty: 30 TABLET | Refills: 0 | Status: SHIPPED | OUTPATIENT
Start: 2022-11-09 | End: 2022-12-09

## 2022-11-09 RX ORDER — TIRZEPATIDE 2.5 MG/.5ML
2.5 INJECTION, SOLUTION SUBCUTANEOUS
Qty: 4 PEN | Refills: 3 | Status: SHIPPED | OUTPATIENT
Start: 2022-11-09 | End: 2022-11-23

## 2022-11-09 RX ORDER — DICYCLOMINE HYDROCHLORIDE 10 MG/1
10 CAPSULE ORAL
Qty: 60 CAPSULE | Refills: 0 | Status: SHIPPED | OUTPATIENT
Start: 2022-11-09 | End: 2022-11-28

## 2022-11-09 NOTE — PROGRESS NOTES
Subjective:   .  Get note    Patient ID: Siobhan Richards is a 52 y.o. female.    Gyn:  Dr. Camilo / last menses year ago   MM2021  Dexa:  2020 normal  Colonoscopy:  19 cologaurd negative / due    Immunizations:  Tdap: check old note Pneumonia:  Shingles: no  Covid: yes   Smoker:  Former    Chief Complaint: Annual Exam    HPI    Wellness       Anxiety: hx of out of blue mild panic, Driving anxiety. Rx  prn Buspar.  Prev took wellbutrin doesn't want anything take causes weight gain.      Glucose intolerance:  G 95 // Rx metformin (on hold) Mounjaro gas nausea   //   A1C 5.1     IBS D: urgency/diarrhea -since GB out.   Tired Welchol ? If helped     Metabolic syndrome: BMI started 37 @ 34 maintaining 219 lbs. - Mounjaro 2.5 nausea and gas bloating.  // BMI 33 & 214 // Prev Rx Adipex. &  Topamax 100. Improved headaches.      GERD: small HH Rx: Protonix 40     Mild low TSH 3.1 has steadily increased over last few checks.-trail of meds did notice diff w energy, stopped.   Migraines: Rx Topamax 100 mg//ear pain is s/s //  p.r.n. medicines  Leg swelling: Rx prn lasix   Mixed HLD: controlled : Rx pravastatin 40.    ADD: off meds currently-wants restart something that doesn't make her tired next day.  prev Adderall 20 and maybe Vyvanse   Vitamin-D deficiency: OTC supplement    Allergies:  + tree, grass.- Dr Dominguez recommended shots - hasnt gone back yet. Pnx titers all low -will boost today w vaccine and rc    Review of Systems:  Review of Systems   Constitutional:  Negative for appetite change.   HENT:  Negative for nosebleeds.    Eyes:  Negative for pain.   Respiratory:  Positive for cough. Negative for choking.    Cardiovascular:  Negative for chest pain.   Gastrointestinal:  Negative for blood in stool.   Genitourinary:  Negative for hematuria.   Musculoskeletal:  Negative for joint swelling.   Skin:  Negative for pallor.   Neurological:  Negative for facial asymmetry.  "  Hematological:  Does not bruise/bleed easily.   Psychiatric/Behavioral:  Negative for confusion.      Objective:     Vitals:    11/09/22 0758   BP: 102/74   BP Location: Left arm   Weight: 97.4 kg (214 lb 13.4 oz)   Height: 5' 7" (1.702 m)          Physical Exam  Vitals reviewed.   Constitutional:       Appearance: Normal appearance.   HENT:      Head: Normocephalic and atraumatic.      Mouth/Throat:      Pharynx: Oropharynx is clear.   Eyes:      Extraocular Movements: Extraocular movements intact.      Conjunctiva/sclera: Conjunctivae normal.      Pupils: Pupils are equal, round, and reactive to light.   Cardiovascular:      Rate and Rhythm: Normal rate and regular rhythm.      Heart sounds: Normal heart sounds.   Pulmonary:      Effort: Pulmonary effort is normal.      Breath sounds: Normal breath sounds.   Abdominal:      General: Bowel sounds are normal.      Palpations: Abdomen is soft.   Musculoskeletal:         General: Normal range of motion.      Cervical back: Normal range of motion and neck supple.   Skin:     General: Skin is warm and dry.   Neurological:      General: No focal deficit present.      Mental Status: She is alert and oriented to person, place, and time.   Psychiatric:         Mood and Affect: Mood normal.       Medication List with Changes/Refills   New Medications    COLESEVELAM (WELCHOL) 625 MG TABLET    Take 1 tablet (625 mg total) by mouth 2 (two) times daily with meals. Diarrhea    DICYCLOMINE (BENTYL) 10 MG CAPSULE    Take 1 capsule (10 mg total) by mouth 4 (four) times daily before meals and nightly. Prn stomach pains    PHENTERMINE (ADIPEX-P) 37.5 MG TABLET    Take 1 tablet (37.5 mg total) by mouth before breakfast.   Current Medications    ASPIRIN-ACETAMINOPHEN-CAFFEINE 250-250-65 MG (EXCEDRIN MIGRAINE) 250-250-65 MG PER TABLET    Excedrin Migraine   prn    BENZONATATE (TESSALON) 200 MG CAPSULE    Take 1 capsule (200 mg total) by mouth 2 (two) times daily as needed for Cough.    " COENZYME Q10 200 MG CAPSULE        ELETRIPTAN (RELPAX) 40 MG TABLET    Take 1 tablet (40 mg total) by mouth as needed (migraine). may repeat in 2 hours if necessary. Max is 2/day.    ERGOCALCIFEROL (ERGOCALCIFEROL) 50,000 UNIT CAP    TAKE 1 CAPSULE BY MOUTH ONE TIME PER WEEK    FLUTICASONE PROPIONATE (FLONASE) 50 MCG/ACTUATION NASAL SPRAY    1 spray (50 mcg total) by Each Nostril route 2 (two) times daily.    FUROSEMIDE (LASIX) 20 MG TABLET    TAKE 1 TABLET ONCE A DAY AS NEEDED SWELLING    METHYLPHENIDATE HCL (RITALIN) 20 MG TABLET    Take 1 tablet (20 mg total) by mouth once daily.    MULTIVITAMIN (DAILY VITAMIN ORAL)        ONDANSETRON (ZOFRAN-ODT) 4 MG TBDL    Take 1 tablet (4 mg total) by mouth every 8 (eight) hours as needed (nausea).    PANTOPRAZOLE (PROTONIX) 40 MG TABLET    TAKE 1 TABLET BY MOUTH EVERY DAY    PRAVASTATIN (PRAVACHOL) 40 MG TABLET    Take 1 tablet (40 mg total) by mouth once daily.    PROMETHAZINE (PHENERGAN) 25 MG TABLET    Take 1 tablet (25 mg total) by mouth every 6 (six) hours as needed for Nausea.    TOPIRAMATE (TOPAMAX) 50 MG TABLET    TAKE 1 TABLET BY MOUTH TWICE A DAY    VITAMIN E ACETATE (VITAMIN E ORAL)    Take by mouth.   Changed and/or Refilled Medications    Modified Medication Previous Medication    TIRZEPATIDE (MOUNJARO) 2.5 MG/0.5 ML PNIJ tirzepatide (MOUNJARO) 2.5 mg/0.5 mL PnIj       Inject 2.5 mg into the skin every 7 days.    Inject 2.5 mg into the skin every 7 days. Call office for refill   Discontinued Medications    HYDROCODONE-CHLORPHENIRAMINE (TUSSIONEX) 10-8 MG/5 ML SUSPENSION    Take 5 mLs by mouth every 12 (twelve) hours as needed for Cough.    PREDNISONE (DELTASONE) 10 MG TABLET    Take 1 tablet (10 mg total) by mouth once daily.    TURMERIC ORAL    Take by mouth.       Assessment & Plan:  1. Wellness examination    2. Irritable bowel syndrome with diarrhea    3. Stomach cramps  - dicyclomine (BENTYL) 10 MG capsule; Take 1 capsule (10 mg total) by mouth 4 (four)  times daily before meals and nightly. Prn stomach pains  Dispense: 60 capsule; Refill: 0    4. Metabolic syndrome  - tirzepatide (MOUNJARO) 2.5 mg/0.5 mL PnIj; Inject 2.5 mg into the skin every 7 days.  Dispense: 4 pen; Refill: 3  - phentermine (ADIPEX-P) 37.5 mg tablet; Take 1 tablet (37.5 mg total) by mouth before breakfast.  Dispense: 30 tablet; Refill: 0    5. Glucose intolerance (impaired glucose tolerance)    6. Obesity (BMI 30-39.9)    7. Migraine without status migrainosus, not intractable, unspecified migraine type     Wellness examination    Irritable bowel syndrome with diarrhea  Comments:  trial of Welchol 1 or 2 pills dialy     Stomach cramps  -     dicyclomine (BENTYL) 10 MG capsule; Take 1 capsule (10 mg total) by mouth 4 (four) times daily before meals and nightly. Prn stomach pains  Dispense: 60 capsule; Refill: 0    Metabolic syndrome  -     tirzepatide (MOUNJARO) 2.5 mg/0.5 mL PnIj; Inject 2.5 mg into the skin every 7 days.  Dispense: 4 pen; Refill: 3  -     phentermine (ADIPEX-P) 37.5 mg tablet; Take 1 tablet (37.5 mg total) by mouth before breakfast.  Dispense: 30 tablet; Refill: 0    Glucose intolerance (impaired glucose tolerance)    Obesity (BMI 30-39.9)  Comments:  # 1 adipex     Migraine without status migrainosus, not intractable, unspecified migraine type    Other orders  -     colesevelam (WELCHOL) 625 mg tablet; Take 1 tablet (625 mg total) by mouth 2 (two) times daily with meals. Diarrhea  Dispense: 60 tablet; Refill: 1        Continue to work on regular exercise, maintain healthy weight, balanced diet. Avoid unhealthy habits: smoking, excessive alcohol intake.

## 2022-11-16 ENCOUNTER — PATIENT MESSAGE (OUTPATIENT)
Dept: ADMINISTRATIVE | Facility: HOSPITAL | Age: 52
End: 2022-11-16
Payer: COMMERCIAL

## 2022-11-23 ENCOUNTER — PATIENT MESSAGE (OUTPATIENT)
Dept: FAMILY MEDICINE | Facility: CLINIC | Age: 52
End: 2022-11-23
Payer: COMMERCIAL

## 2022-11-23 DIAGNOSIS — E66.9 OBESITY (BMI 30-39.9): ICD-10-CM

## 2022-11-23 DIAGNOSIS — E88.810 METABOLIC SYNDROME: ICD-10-CM

## 2022-11-23 DIAGNOSIS — R73.02 GLUCOSE INTOLERANCE (IMPAIRED GLUCOSE TOLERANCE): Primary | ICD-10-CM

## 2022-11-23 RX ORDER — TIRZEPATIDE 5 MG/.5ML
5 INJECTION, SOLUTION SUBCUTANEOUS
Qty: 12 PEN | Refills: 1 | Status: SHIPPED | OUTPATIENT
Start: 2022-11-23 | End: 2023-02-02 | Stop reason: SDUPTHER

## 2022-11-26 DIAGNOSIS — R10.9 STOMACH CRAMPS: ICD-10-CM

## 2022-11-28 RX ORDER — DICYCLOMINE HYDROCHLORIDE 10 MG/1
10 CAPSULE ORAL 4 TIMES DAILY
Qty: 30 CAPSULE | Refills: 0 | Status: SHIPPED | OUTPATIENT
Start: 2022-11-28

## 2022-11-29 ENCOUNTER — PATIENT MESSAGE (OUTPATIENT)
Dept: FAMILY MEDICINE | Facility: CLINIC | Age: 52
End: 2022-11-29
Payer: COMMERCIAL

## 2022-11-29 DIAGNOSIS — R05.9 COUGH, UNSPECIFIED TYPE: Primary | ICD-10-CM

## 2022-11-29 NOTE — TELEPHONE ENCOUNTER
Ordered her xray chest      Could be reactive airway/asthma.  Does she have an inhaler at home to try?  Like albuterol

## 2022-11-30 ENCOUNTER — PATIENT MESSAGE (OUTPATIENT)
Dept: FAMILY MEDICINE | Facility: CLINIC | Age: 52
End: 2022-11-30
Payer: COMMERCIAL

## 2022-11-30 RX ORDER — ALBUTEROL SULFATE 90 UG/1
2 AEROSOL, METERED RESPIRATORY (INHALATION) EVERY 6 HOURS PRN
Refills: 0 | OUTPATIENT
Start: 2022-11-30 | End: 2023-11-30

## 2022-11-30 RX ORDER — ALBUTEROL SULFATE 90 UG/1
2 AEROSOL, METERED RESPIRATORY (INHALATION) EVERY 6 HOURS PRN
Qty: 18 G | Refills: 0 | Status: SHIPPED | OUTPATIENT
Start: 2022-11-30 | End: 2023-11-30

## 2022-11-30 NOTE — TELEPHONE ENCOUNTER
[facility] DC. Request already responded to by other means (e.g. phone or fax)   Refill Authorization Note   Siobhan Richards  is requesting a refill authorization.  Brief Assessment and Rationale for Refill:  Quick Discontinue  Medication Therapy Plan:  Signed 11/30/22    Medication Reconciliation Completed:  No      Comments:     Note composed:2:48 PM 11/30/2022

## 2022-11-30 NOTE — TELEPHONE ENCOUNTER
No new care gaps identified.  [facility] Embedded Care Gaps. Reference number: [id number]. 11/30/2022   12:32:28 PM CST

## 2022-12-01 ENCOUNTER — PATIENT MESSAGE (OUTPATIENT)
Dept: FAMILY MEDICINE | Facility: CLINIC | Age: 52
End: 2022-12-01
Payer: COMMERCIAL

## 2022-12-01 RX ORDER — METHYLPREDNISOLONE 4 MG/1
TABLET ORAL
Qty: 21 EACH | Refills: 0 | Status: SHIPPED | OUTPATIENT
Start: 2022-12-01 | End: 2022-12-22

## 2023-01-12 RX ORDER — PANTOPRAZOLE SODIUM 40 MG/1
TABLET, DELAYED RELEASE ORAL
Qty: 90 TABLET | Refills: 3 | Status: SHIPPED | OUTPATIENT
Start: 2023-01-12 | End: 2023-06-06 | Stop reason: ALTCHOICE

## 2023-01-12 NOTE — TELEPHONE ENCOUNTER
No new care gaps identified.  [facility] Embedded Care Gaps. Reference number: [id number]. 1/12/2023   12:11:20 AM CST

## 2023-01-12 NOTE — TELEPHONE ENCOUNTER
Refill Decision Note   Siobhan Richards  is requesting a refill authorization.  Brief Assessment and Rationale for Refill:  Approve     Medication Therapy Plan:       Medication Reconciliation Completed: No   Comments:     No Care Gaps recommended.     Note composed:7:38 AM 01/12/2023

## 2023-02-03 ENCOUNTER — PATIENT MESSAGE (OUTPATIENT)
Dept: FAMILY MEDICINE | Facility: CLINIC | Age: 53
End: 2023-02-03
Payer: COMMERCIAL

## 2023-02-09 ENCOUNTER — PATIENT MESSAGE (OUTPATIENT)
Dept: FAMILY MEDICINE | Facility: CLINIC | Age: 53
End: 2023-02-09
Payer: COMMERCIAL

## 2023-02-09 DIAGNOSIS — R73.02 GLUCOSE INTOLERANCE (IMPAIRED GLUCOSE TOLERANCE): Primary | ICD-10-CM

## 2023-02-15 NOTE — TELEPHONE ENCOUNTER
----- Message from Chandrika Kamara sent at 2/14/2023  3:08 PM CST -----  Contact: masha  Type: Needs Medical Advice  Who Called:  Masha with [facility]   Best Call Back Number: [phone]  Additional Information: they keep getting a fax for a prescription drug authorization on this patient for mounjuro - jen fax number

## 2023-02-20 ENCOUNTER — PATIENT MESSAGE (OUTPATIENT)
Dept: FAMILY MEDICINE | Facility: CLINIC | Age: 53
End: 2023-02-20
Payer: COMMERCIAL

## 2023-02-20 RX ORDER — METFORMIN HYDROCHLORIDE 500 MG/1
500 TABLET, EXTENDED RELEASE ORAL
Qty: 90 TABLET | Refills: 1 | Status: SHIPPED | OUTPATIENT
Start: 2023-02-20 | End: 2024-02-20

## 2023-03-20 ENCOUNTER — OFFICE VISIT (OUTPATIENT)
Dept: FAMILY MEDICINE | Facility: CLINIC | Age: 53
End: 2023-03-20
Payer: COMMERCIAL

## 2023-03-20 VITALS
RESPIRATION RATE: 16 BRPM | WEIGHT: 212.75 LBS | OXYGEN SATURATION: 98 % | HEIGHT: 67 IN | SYSTOLIC BLOOD PRESSURE: 118 MMHG | BODY MASS INDEX: 33.39 KG/M2 | DIASTOLIC BLOOD PRESSURE: 78 MMHG | HEART RATE: 63 BPM

## 2023-03-20 DIAGNOSIS — Z13.6 ENCOUNTER FOR SCREENING FOR CARDIOVASCULAR DISORDERS: ICD-10-CM

## 2023-03-20 DIAGNOSIS — Z00.00 WELLNESS EXAMINATION: ICD-10-CM

## 2023-03-20 DIAGNOSIS — Z01.89 ENCOUNTER FOR ROUTINE LABORATORY TESTING: ICD-10-CM

## 2023-03-20 DIAGNOSIS — E78.2 MIXED HYPERLIPIDEMIA: ICD-10-CM

## 2023-03-20 DIAGNOSIS — Z12.11 COLON CANCER SCREENING: ICD-10-CM

## 2023-03-20 DIAGNOSIS — D80.3 IGG3 SUBCLASS DEFICIENCY: ICD-10-CM

## 2023-03-20 DIAGNOSIS — E55.9 VITAMIN D DEFICIENCY: ICD-10-CM

## 2023-03-20 DIAGNOSIS — E66.01 SEVERE OBESITY (BMI 35.0-39.9) WITH COMORBIDITY: ICD-10-CM

## 2023-03-20 DIAGNOSIS — R73.02 GLUCOSE INTOLERANCE (IMPAIRED GLUCOSE TOLERANCE): Primary | ICD-10-CM

## 2023-03-20 PROCEDURE — 1160F RVW MEDS BY RX/DR IN RCRD: CPT | Mod: CPTII,S$GLB,, | Performed by: INTERNAL MEDICINE

## 2023-03-20 PROCEDURE — 99214 PR OFFICE/OUTPT VISIT, EST, LEVL IV, 30-39 MIN: ICD-10-PCS | Mod: S$GLB,,, | Performed by: INTERNAL MEDICINE

## 2023-03-20 PROCEDURE — 99214 OFFICE O/P EST MOD 30 MIN: CPT | Mod: S$GLB,,, | Performed by: INTERNAL MEDICINE

## 2023-03-20 PROCEDURE — 3008F BODY MASS INDEX DOCD: CPT | Mod: CPTII,S$GLB,, | Performed by: INTERNAL MEDICINE

## 2023-03-20 PROCEDURE — 3074F SYST BP LT 130 MM HG: CPT | Mod: CPTII,S$GLB,, | Performed by: INTERNAL MEDICINE

## 2023-03-20 PROCEDURE — 3074F PR MOST RECENT SYSTOLIC BLOOD PRESSURE < 130 MM HG: ICD-10-PCS | Mod: CPTII,S$GLB,, | Performed by: INTERNAL MEDICINE

## 2023-03-20 PROCEDURE — 99999 PR PBB SHADOW E&M-EST. PATIENT-LVL III: CPT | Mod: PBBFAC,,, | Performed by: INTERNAL MEDICINE

## 2023-03-20 PROCEDURE — 3078F DIAST BP <80 MM HG: CPT | Mod: CPTII,S$GLB,, | Performed by: INTERNAL MEDICINE

## 2023-03-20 PROCEDURE — 1159F MED LIST DOCD IN RCRD: CPT | Mod: CPTII,S$GLB,, | Performed by: INTERNAL MEDICINE

## 2023-03-20 PROCEDURE — 3008F PR BODY MASS INDEX (BMI) DOCUMENTED: ICD-10-PCS | Mod: CPTII,S$GLB,, | Performed by: INTERNAL MEDICINE

## 2023-03-20 PROCEDURE — 99999 PR PBB SHADOW E&M-EST. PATIENT-LVL III: ICD-10-PCS | Mod: PBBFAC,,, | Performed by: INTERNAL MEDICINE

## 2023-03-20 PROCEDURE — 1160F PR REVIEW ALL MEDS BY PRESCRIBER/CLIN PHARMACIST DOCUMENTED: ICD-10-PCS | Mod: CPTII,S$GLB,, | Performed by: INTERNAL MEDICINE

## 2023-03-20 PROCEDURE — 3078F PR MOST RECENT DIASTOLIC BLOOD PRESSURE < 80 MM HG: ICD-10-PCS | Mod: CPTII,S$GLB,, | Performed by: INTERNAL MEDICINE

## 2023-03-20 PROCEDURE — 1159F PR MEDICATION LIST DOCUMENTED IN MEDICAL RECORD: ICD-10-PCS | Mod: CPTII,S$GLB,, | Performed by: INTERNAL MEDICINE

## 2023-03-20 NOTE — PROGRESS NOTES
Subjective:       Patient ID: Siobhan Richards is a 52 y.o. female.    Chief Complaint: Follow-up   HPI    Gyn:  Dr. Camilo / last menses year ago   MM2021  Dexa:  2020 normal  Colonoscopy:  19 cologaurd negative / due    Immunizations:  Tdap: check old note Pneumonia:  Shingles: no  Covid: yes   Smoker:  Former       F/u     Out of Moujaro -wasn't approved.  Issues w pa and insurance.   I Ozempic  is option       Glucose intolerance:  G 95 // Rx Metformin once daily and off Mounjaro since  due to insurance issues.   //   A1C 5.1    (Highest 5.1 and 107)      IBS D: urgency/diarrhea -since  out.   Tired Welchol ? If helped      Metabolic syndrome: BMI started 37, BMI 33 & 212 // Prev Rx Adipex. &  Topamax 100. Improved headaches.       GERD: small HH controlled Rx: Protonix 40      Migraines: Rx Topamax 100 mg//ear pain is s/s //  p.r.n. medicines  Leg swelling: Rx prn lasix   Mixed HLD: controlled : Rx pravastatin 40.      Anxiety: hx of out of blue mild panic, Driving anxiety. Rx  prn Buspar.  Prev took wellbutrin doesn't want anything take causes weight gain.   ADD: off meds currently-wants restart something that doesn't make her tired next day.  prev Adderall 20 and maybe Vyvanse   Vitamin-D deficiency: Rx Vit D2 weekly      Allergies:  + tree, grass.- Dr Dominguez recommended shots. Pt chose to defer. Pnx titers all low.   ____________________________________________________________________________________________________  Assessment & Plan:  1. Glucose intolerance (impaired glucose tolerance)  - Comprehensive Metabolic Panel; Future  - Hemoglobin A1C; Future  - Insulin, Random; Future    2. Mixed hyperlipidemia  - Lipid Panel; Future    3. Vitamin D deficiency  - Vitamin D; Future    4. IgG3 subclass deficiency    5. Wellness examination    6. Colon cancer screening  - Cologuard Screening (Multitarget Stool DNA); Future  - Cologuard Screening (Multitarget Stool  DNA)    7. Encounter for routine laboratory testing  - Comprehensive Metabolic Panel; Future  - Hemoglobin A1C; Future  - Lipid Panel; Future  - Insulin, Random; Future    8. Severe obesity (BMI 35.0-39.9) with comorbidity     Glucose intolerance (impaired glucose tolerance)  Comments:  re due PA for Glucose and name on card doesnt have McGreal   Orders:  -     Comprehensive Metabolic Panel; Future; Expected date: 03/20/2023  -     Hemoglobin A1C; Future; Expected date: 03/20/2023  -     Insulin, Random; Future; Expected date: 03/20/2023    Mixed hyperlipidemia  -     Lipid Panel; Future; Expected date: 03/20/2023    Vitamin D deficiency  -     Vitamin D; Future; Expected date: 03/20/2023    IgG3 subclass deficiency    Wellness examination    Colon cancer screening  -     Cologuard Screening (Multitarget Stool DNA); Future; Expected date: 03/20/2023    Encounter for routine laboratory testing  -     Comprehensive Metabolic Panel; Future; Expected date: 03/20/2023  -     Hemoglobin A1C; Future; Expected date: 03/20/2023  -     Lipid Panel; Future; Expected date: 03/20/2023  -     Insulin, Random; Future; Expected date: 03/20/2023    Severe obesity (BMI 35.0-39.9) with comorbidity        Continue to work on regular exercise, maintain healthy weight, balanced diet. Avoid unhealthy habits: smoking, excessive alcohol intake.     Disclaimer: This note was partly generated using dictation software which may occasionally result in transcription errors  ____________________________________________________________________________________________________  Review of Systems:  Review of Systems   Constitutional:  Negative for activity change, appetite change and unexpected weight change.   HENT:  Negative for hearing loss, nosebleeds, rhinorrhea and trouble swallowing.    Eyes:  Negative for pain, discharge and visual disturbance.   Respiratory:  Negative for choking, chest tightness and wheezing.    Cardiovascular:  Negative  for chest pain and palpitations.   Gastrointestinal:  Negative for blood in stool, constipation, diarrhea and vomiting.   Endocrine: Negative for polydipsia and polyuria.   Genitourinary:  Negative for difficulty urinating, dysuria, hematuria and menstrual problem.   Musculoskeletal:  Negative for arthralgias, joint swelling and neck pain.   Skin:  Negative for pallor.   Neurological:  Negative for facial asymmetry, weakness and headaches.   Hematological:  Does not bruise/bleed easily.   Psychiatric/Behavioral:  Negative for confusion and dysphoric mood.      Objective:     Wt Readings from Last 3 Encounters:   03/20/23 96.5 kg (212 lb 11.9 oz)   11/28/22 97.1 kg (214 lb)   11/09/22 97.4 kg (214 lb 13.4 oz)     BP Readings from Last 3 Encounters:   03/20/23 118/78   11/09/22 102/74   10/18/22 120/80       Lab Results   Component Value Date    WBC 10.44 11/05/2022    HGB 14.6 11/05/2022    HCT 43.6 11/05/2022     11/05/2022     11/05/2022    K 4.9 11/05/2022     11/05/2022    ALT 16 11/05/2022    AST 19 11/05/2022    CO2 27 11/05/2022    CREATININE 0.83 11/05/2022    BUN 12 11/05/2022    GLU 95 11/05/2022      Hemoglobin A1C   Date Value Ref Range Status   11/05/2022 5.1 0.0 - 5.6 % Final     Comment:     Reference Interval:  5.0 - 5.6 Normal   5.7 - 6.4 High Risk   > 6.5 Diabetic      Hgb A1c results are standardized based on the (NGSP) National   Glycohemoglobin Standardization Program.      Hemoglobin A1C levels are related to mean serum/plasma glucose   during the preceding 2-3 months.        10/06/2021 5.4 0.0 - 5.6 % Final     Comment:     Reference Interval:  5.0 - 5.6 Normal   5.7 - 6.4 High Risk   > 6.5 Diabetic      Hgb A1c results are standardized based on the (NGSP) National   Glycohemoglobin Standardization Program.      Hemoglobin A1C levels are related to mean serum/plasma glucose   during the preceding 2-3 months.        10/24/2018 5.1 0.0 - 5.6 % Final     Comment:     Reference  Interval:  5.0 - 5.6 Normal   5.7 - 6.4 High Risk   > 6.5 Diabetic    Hgb A1c results are standardized based on the (NGSP) National   Glycohemoglobin Standardization Program.    Hemoglobin A1C levels are related to mean serum/plasma glucose   during the preceding 2-3 months.           Lab Results   Component Value Date    TSH 1.810 11/05/2022    TSH 2.700 07/21/2022    TSH 2.200 12/29/2021     Lab Results   Component Value Date    FREET4 1.12 07/21/2022    FREET4 1.28 12/29/2021    FREET4 1.09 11/07/2020     Lab Results   Component Value Date    LDLCALC 105.6 11/05/2022    LDLCALC 127.2 10/06/2021    LDLCALC 110.2 04/17/2021     Lab Results   Component Value Date    TRIG 127 11/05/2022    TRIG 144 10/06/2021    TRIG 124 04/17/2021            Physical Exam  Constitutional:       Appearance: Normal appearance.   HENT:      Head: Normocephalic and atraumatic.   Eyes:      Extraocular Movements: Extraocular movements intact.      Pupils: Pupils are equal, round, and reactive to light.   Cardiovascular:      Rate and Rhythm: Normal rate.   Pulmonary:      Effort: Pulmonary effort is normal.   Neurological:      Mental Status: She is alert and oriented to person, place, and time.   Psychiatric:         Mood and Affect: Mood normal.       Medication List with Changes/Refills   Current Medications    ALBUTEROL (VENTOLIN HFA) 90 MCG/ACTUATION INHALER    Inhale 2 puffs into the lungs every 6 (six) hours as needed for Wheezing. Rescue    ASPIRIN-ACETAMINOPHEN-CAFFEINE 250-250-65 MG (EXCEDRIN MIGRAINE) 250-250-65 MG PER TABLET    Excedrin Migraine   prn    BENZONATATE (TESSALON) 200 MG CAPSULE    Take 1 capsule (200 mg total) by mouth 2 (two) times daily as needed for Cough.    COENZYME Q10 200 MG CAPSULE        COLESEVELAM (WELCHOL) 625 MG TABLET    Take 1 tablet (625 mg total) by mouth 2 (two) times daily with meals. Diarrhea    DICYCLOMINE (BENTYL) 10 MG CAPSULE    Take 1 capsule (10 mg total) by mouth 4 (four) times daily.  Prn stomach cramps    ERGOCALCIFEROL (ERGOCALCIFEROL) 50,000 UNIT CAP    TAKE 1 CAPSULE BY MOUTH ONE TIME PER WEEK    FUROSEMIDE (LASIX) 20 MG TABLET    TAKE 1 TABLET ONCE A DAY AS NEEDED SWELLING    METFORMIN (GLUCOPHAGE-XR) 500 MG ER 24HR TABLET    Take 1 tablet (500 mg total) by mouth daily with breakfast.    METHYLPHENIDATE HCL (RITALIN) 20 MG TABLET    Take 1 tablet (20 mg total) by mouth once daily.    MULTIVITAMIN (DAILY VITAMIN ORAL)        ONDANSETRON (ZOFRAN-ODT) 4 MG TBDL    Take 1 tablet (4 mg total) by mouth every 8 (eight) hours as needed (nausea).    PANTOPRAZOLE (PROTONIX) 40 MG TABLET    TAKE 1 TABLET BY MOUTH EVERY DAY    PRAVASTATIN (PRAVACHOL) 40 MG TABLET    TAKE 1 TABLET BY MOUTH EVERY DAY    PROMETHAZINE (PHENERGAN) 25 MG TABLET    Take 1 tablet (25 mg total) by mouth every 6 (six) hours as needed for Nausea.    TOPIRAMATE (TOPAMAX) 50 MG TABLET    TAKE 1 TABLET BY MOUTH TWICE A DAY    VITAMIN E ACETATE (VITAMIN E ORAL)    Take by mouth.   Discontinued Medications    ELETRIPTAN (RELPAX) 40 MG TABLET    Take 1 tablet (40 mg total) by mouth as needed (migraine). may repeat in 2 hours if necessary. Max is 2/day.

## 2023-03-21 ENCOUNTER — PATIENT MESSAGE (OUTPATIENT)
Dept: FAMILY MEDICINE | Facility: CLINIC | Age: 53
End: 2023-03-21
Payer: COMMERCIAL

## 2023-03-21 DIAGNOSIS — R30.0 DYSURIA: Primary | ICD-10-CM

## 2023-03-22 ENCOUNTER — PATIENT MESSAGE (OUTPATIENT)
Dept: FAMILY MEDICINE | Facility: CLINIC | Age: 53
End: 2023-03-22
Payer: COMMERCIAL

## 2023-03-22 ENCOUNTER — TELEPHONE (OUTPATIENT)
Dept: FAMILY MEDICINE | Facility: CLINIC | Age: 53
End: 2023-03-22
Payer: COMMERCIAL

## 2023-03-22 NOTE — TELEPHONE ENCOUNTER
Let her know they will cover these injectables for prediabetes    [facility] - weight loss clinic is writing compounded rx.

## 2023-03-22 NOTE — TELEPHONE ENCOUNTER
"Please advise.     Mounjoro and Ozempic is no longer covered for weight loss. Patient asking for a "compounded mounjoro" prescription.  "

## 2023-03-23 ENCOUNTER — PATIENT MESSAGE (OUTPATIENT)
Dept: FAMILY MEDICINE | Facility: CLINIC | Age: 53
End: 2023-03-23
Payer: COMMERCIAL

## 2023-03-23 RX ORDER — ONDANSETRON 4 MG/1
4 TABLET, ORALLY DISINTEGRATING ORAL EVERY 8 HOURS PRN
Qty: 30 TABLET | Refills: 3 | Status: SHIPPED | OUTPATIENT
Start: 2023-03-23 | End: 2024-01-17 | Stop reason: SDUPTHER

## 2023-03-23 NOTE — TELEPHONE ENCOUNTER
Care Due:                  Date            Visit Type   Department     Provider  --------------------------------------------------------------------------------                                MYCHART                              FOLLOWUP/OF  [facility] FAMILY  Last Visit: 03-      FICE VISIT   MEDICINE       Param Moses  Next Visit: None Scheduled  None         None Found                                                            Last  Test          Frequency    Reason                     Performed    Due Date  --------------------------------------------------------------------------------    HBA1C.......  6 months...  metFORMIN................  11- 05-    [facility] Embedded Care Gaps. Reference number: [id number]. 3/23/2023   7:32:52 AM CDT

## 2023-03-31 ENCOUNTER — PATIENT MESSAGE (OUTPATIENT)
Dept: FAMILY MEDICINE | Facility: CLINIC | Age: 53
End: 2023-03-31
Payer: COMMERCIAL

## 2023-03-31 DIAGNOSIS — E66.01 SEVERE OBESITY (BMI 35.0-39.9) WITH COMORBIDITY: ICD-10-CM

## 2023-03-31 DIAGNOSIS — R73.02 GLUCOSE INTOLERANCE (IMPAIRED GLUCOSE TOLERANCE): Primary | ICD-10-CM

## 2023-04-03 RX ORDER — SEMAGLUTIDE 1.34 MG/ML
1 INJECTION, SOLUTION SUBCUTANEOUS
Qty: 3 ML | Refills: 1 | Status: SHIPPED | OUTPATIENT
Start: 2023-04-03 | End: 2023-05-25

## 2023-04-11 ENCOUNTER — PATIENT MESSAGE (OUTPATIENT)
Dept: ADMINISTRATIVE | Facility: HOSPITAL | Age: 53
End: 2023-04-11
Payer: COMMERCIAL

## 2023-04-26 ENCOUNTER — TELEPHONE (OUTPATIENT)
Dept: FAMILY MEDICINE | Facility: CLINIC | Age: 53
End: 2023-04-26
Payer: COMMERCIAL

## 2023-04-26 ENCOUNTER — PATIENT MESSAGE (OUTPATIENT)
Dept: FAMILY MEDICINE | Facility: CLINIC | Age: 53
End: 2023-04-26
Payer: COMMERCIAL

## 2023-04-26 DIAGNOSIS — F98.8 ADD (ATTENTION DEFICIT DISORDER) WITHOUT HYPERACTIVITY: Primary | ICD-10-CM

## 2023-04-28 RX ORDER — LISDEXAMFETAMINE DIMESYLATE CAPSULES 20 MG/1
20 CAPSULE ORAL EVERY MORNING
Qty: 30 CAPSULE | Refills: 0 | Status: SHIPPED | OUTPATIENT
Start: 2023-04-28 | End: 2023-08-21 | Stop reason: SDUPTHER

## 2023-05-08 ENCOUNTER — PATIENT MESSAGE (OUTPATIENT)
Dept: FAMILY MEDICINE | Facility: CLINIC | Age: 53
End: 2023-05-08
Payer: COMMERCIAL

## 2023-05-08 DIAGNOSIS — E66.01 SEVERE OBESITY (BMI 35.0-39.9) WITH COMORBIDITY: Primary | ICD-10-CM

## 2023-05-08 RX ORDER — PHENTERMINE HYDROCHLORIDE 37.5 MG/1
37.5 TABLET ORAL
Qty: 30 TABLET | Refills: 0 | Status: SHIPPED | OUTPATIENT
Start: 2023-05-08 | End: 2023-05-25 | Stop reason: SDUPTHER

## 2023-05-08 RX ORDER — ERGOCALCIFEROL 1.25 MG/1
CAPSULE ORAL
Qty: 12 CAPSULE | Refills: 0 | Status: SHIPPED | OUTPATIENT
Start: 2023-05-08 | End: 2024-01-17

## 2023-05-08 NOTE — TELEPHONE ENCOUNTER
Refill Routing Note   Medication(s) are not appropriate for processing by [facility] for the following reason(s):      Medication outside of protocol    [facility] action(s):  Route None identified          Appointments  past 12m or future 3m with PCP    Date Provider   Last Visit   3/20/2023 Param Moses MD   Next Visit   7/3/2023 Param Moses MD   ED visits in past 90 days: 0        Note composed:6:47 AM 05/08/2023

## 2023-05-09 ENCOUNTER — PATIENT MESSAGE (OUTPATIENT)
Dept: FAMILY MEDICINE | Facility: CLINIC | Age: 53
End: 2023-05-09
Payer: COMMERCIAL

## 2023-05-25 ENCOUNTER — OFFICE VISIT (OUTPATIENT)
Dept: FAMILY MEDICINE | Facility: CLINIC | Age: 53
End: 2023-05-25
Payer: COMMERCIAL

## 2023-05-25 VITALS
SYSTOLIC BLOOD PRESSURE: 124 MMHG | RESPIRATION RATE: 16 BRPM | BODY MASS INDEX: 32.62 KG/M2 | HEART RATE: 83 BPM | WEIGHT: 207.81 LBS | OXYGEN SATURATION: 97 % | HEIGHT: 67 IN | DIASTOLIC BLOOD PRESSURE: 84 MMHG

## 2023-05-25 DIAGNOSIS — R73.02 GLUCOSE INTOLERANCE (IMPAIRED GLUCOSE TOLERANCE): ICD-10-CM

## 2023-05-25 DIAGNOSIS — I83.812 VARICOSE VEINS OF LEFT LOWER EXTREMITY WITH PAIN: Primary | ICD-10-CM

## 2023-05-25 DIAGNOSIS — E88.810 METABOLIC SYNDROME: ICD-10-CM

## 2023-05-25 DIAGNOSIS — E66.01 SEVERE OBESITY (BMI 35.0-39.9) WITH COMORBIDITY: ICD-10-CM

## 2023-05-25 DIAGNOSIS — F98.8 ADD (ATTENTION DEFICIT DISORDER) WITHOUT HYPERACTIVITY: ICD-10-CM

## 2023-05-25 PROCEDURE — 3079F DIAST BP 80-89 MM HG: CPT | Mod: CPTII,S$GLB,, | Performed by: INTERNAL MEDICINE

## 2023-05-25 PROCEDURE — 3074F SYST BP LT 130 MM HG: CPT | Mod: CPTII,S$GLB,, | Performed by: INTERNAL MEDICINE

## 2023-05-25 PROCEDURE — 3074F PR MOST RECENT SYSTOLIC BLOOD PRESSURE < 130 MM HG: ICD-10-PCS | Mod: CPTII,S$GLB,, | Performed by: INTERNAL MEDICINE

## 2023-05-25 PROCEDURE — 1159F MED LIST DOCD IN RCRD: CPT | Mod: CPTII,S$GLB,, | Performed by: INTERNAL MEDICINE

## 2023-05-25 PROCEDURE — 99214 OFFICE O/P EST MOD 30 MIN: CPT | Mod: S$GLB,,, | Performed by: INTERNAL MEDICINE

## 2023-05-25 PROCEDURE — 3008F PR BODY MASS INDEX (BMI) DOCUMENTED: ICD-10-PCS | Mod: CPTII,S$GLB,, | Performed by: INTERNAL MEDICINE

## 2023-05-25 PROCEDURE — 99999 PR PBB SHADOW E&M-EST. PATIENT-LVL IV: ICD-10-PCS | Mod: PBBFAC,,, | Performed by: INTERNAL MEDICINE

## 2023-05-25 PROCEDURE — 1160F RVW MEDS BY RX/DR IN RCRD: CPT | Mod: CPTII,S$GLB,, | Performed by: INTERNAL MEDICINE

## 2023-05-25 PROCEDURE — 99214 PR OFFICE/OUTPT VISIT, EST, LEVL IV, 30-39 MIN: ICD-10-PCS | Mod: S$GLB,,, | Performed by: INTERNAL MEDICINE

## 2023-05-25 PROCEDURE — 3079F PR MOST RECENT DIASTOLIC BLOOD PRESSURE 80-89 MM HG: ICD-10-PCS | Mod: CPTII,S$GLB,, | Performed by: INTERNAL MEDICINE

## 2023-05-25 PROCEDURE — 99999 PR PBB SHADOW E&M-EST. PATIENT-LVL IV: CPT | Mod: PBBFAC,,, | Performed by: INTERNAL MEDICINE

## 2023-05-25 PROCEDURE — 3008F BODY MASS INDEX DOCD: CPT | Mod: CPTII,S$GLB,, | Performed by: INTERNAL MEDICINE

## 2023-05-25 PROCEDURE — 1160F PR REVIEW ALL MEDS BY PRESCRIBER/CLIN PHARMACIST DOCUMENTED: ICD-10-PCS | Mod: CPTII,S$GLB,, | Performed by: INTERNAL MEDICINE

## 2023-05-25 PROCEDURE — 1159F PR MEDICATION LIST DOCUMENTED IN MEDICAL RECORD: ICD-10-PCS | Mod: CPTII,S$GLB,, | Performed by: INTERNAL MEDICINE

## 2023-05-25 RX ORDER — PHENTERMINE HYDROCHLORIDE 37.5 MG/1
37.5 TABLET ORAL
Qty: 30 TABLET | Refills: 0 | Status: SHIPPED | OUTPATIENT
Start: 2023-05-25 | End: 2023-06-24

## 2023-05-25 RX ORDER — TOPIRAMATE 100 MG/1
100 TABLET, FILM COATED ORAL DAILY
Qty: 90 TABLET | Refills: 0 | Status: SHIPPED | OUTPATIENT
Start: 2023-05-25

## 2023-05-25 NOTE — PROGRESS NOTES
Subjective:       Patient ID: Siobhan Richards is a 52 y.o. female.    Chief Complaint: No chief complaint on file.   HPI    Gyn:  Dr. Camilo / last menses year ago   MM2022  Dexa:  2020 normal  Colonoscopy:  19 cologaurd negative / due    Immunizations:  Tdap: check old note Pneumonia:  Shingles: no  Covid: yes   Smoker:  Former          Left lower leg post thigh mild pain- notices more sitting. + VV. Consider refer cardio for eval LV had work up in past     ADD: hasnt restarted rx yet. Rx Vyvanse 20    prev Adderall 20      Glucose intolerance:  G 95 // Rx Metformin once daily. Insurance wont cover shots.    //   A1C 5.1               (Highest 5.1 and 107)        IBS D: urgency/diarrhea -since GB out.   Tired Welchol ? If helped      Metabolic syndrome: BMI started 37, BMI 33 & 212, 32 & 207. /  Rx Adipex #2 & Topamax 50 .   Improved headaches.       GERD: small HH controlled Rx: Protonix 40      Migraines: improved Rx Topamax 50  mg//ear pain is s/s //  p.r.n. medicines    Leg swelling: Rx prn lasix     Mixed HLD: controlled : Rx pravastatin 40.       Anxiety: hx of out of blue mild panic, Driving anxiety. Rx  prn Buspar.  Prev took wellbutrin doesn't want anything take causes weight gain.     Vitamin-D deficiency: Rx Vit D2 weekly      Allergies:  + tree, grass.- Dr Dominguez recommended shots. Pt chose to defer. Pnx titers all low.   ____________________________________________________________________________________________________  Assessment & Plan:  1. Varicose veins of left lower extremity with pain    2. Metabolic syndrome  - phentermine (ADIPEX-P) 37.5 mg tablet; Take 1 tablet (37.5 mg total) by mouth before breakfast.  Dispense: 30 tablet; Refill: 0  - topiramate (TOPAMAX) 100 MG tablet; Take 1 tablet (100 mg total) by mouth once daily.  Dispense: 90 tablet; Refill: 0    3. Severe obesity (BMI 35.0-39.9) with comorbidity  - phentermine (ADIPEX-P) 37.5 mg tablet; Take 1  tablet (37.5 mg total) by mouth before breakfast.  Dispense: 30 tablet; Refill: 0  - topiramate (TOPAMAX) 100 MG tablet; Take 1 tablet (100 mg total) by mouth once daily.  Dispense: 90 tablet; Refill: 0    4. ADD (attention deficit disorder) without hyperactivity    5. Glucose intolerance (impaired glucose tolerance)     Varicose veins of left lower extremity with pain    Metabolic syndrome  -     phentermine (ADIPEX-P) 37.5 mg tablet; Take 1 tablet (37.5 mg total) by mouth before breakfast.  Dispense: 30 tablet; Refill: 0  -     topiramate (TOPAMAX) 100 MG tablet; Take 1 tablet (100 mg total) by mouth once daily.  Dispense: 90 tablet; Refill: 0    Severe obesity (BMI 35.0-39.9) with comorbidity  -     phentermine (ADIPEX-P) 37.5 mg tablet; Take 1 tablet (37.5 mg total) by mouth before breakfast.  Dispense: 30 tablet; Refill: 0  -     topiramate (TOPAMAX) 100 MG tablet; Take 1 tablet (100 mg total) by mouth once daily.  Dispense: 90 tablet; Refill: 0    ADD (attention deficit disorder) without hyperactivity    Glucose intolerance (impaired glucose tolerance)        Continue to work on regular exercise, maintain healthy weight, balanced diet. Avoid unhealthy habits: smoking, excessive alcohol intake.     Disclaimer: This note was partly generated using dictation software which may occasionally result in transcription errors  ____________________________________________________________________________________________________  Review of Systems:  Review of Systems   Constitutional:  Negative for appetite change.   HENT:  Negative for nosebleeds.    Eyes:  Negative for pain.   Respiratory:  Negative for choking.    Cardiovascular:  Negative for chest pain.   Gastrointestinal:  Negative for blood in stool.   Genitourinary:  Negative for hematuria.   Musculoskeletal:  Negative for joint swelling.   Skin:  Negative for pallor.   Neurological:  Negative for facial asymmetry.   Hematological:  Does not bruise/bleed easily.    Psychiatric/Behavioral:  Negative for confusion.      Objective:     Wt Readings from Last 3 Encounters:   05/25/23 94.3 kg (207 lb 12.5 oz)   03/20/23 96.5 kg (212 lb 11.9 oz)   11/28/22 97.1 kg (214 lb)     BP Readings from Last 3 Encounters:   05/25/23 124/84   03/20/23 118/78   11/09/22 102/74       Lab Results   Component Value Date    WBC 10.44 11/05/2022    HGB 14.6 11/05/2022    HCT 43.6 11/05/2022     11/05/2022     11/05/2022    K 4.9 11/05/2022     11/05/2022    ALT 16 11/05/2022    AST 19 11/05/2022    CO2 27 11/05/2022    CREATININE 0.83 11/05/2022    BUN 12 11/05/2022    GLU 95 11/05/2022      Hemoglobin A1C   Date Value Ref Range Status   11/05/2022 5.1 0.0 - 5.6 % Final     Comment:     Reference Interval:  5.0 - 5.6 Normal   5.7 - 6.4 High Risk   > 6.5 Diabetic      Hgb A1c results are standardized based on the (NGSP) National   Glycohemoglobin Standardization Program.      Hemoglobin A1C levels are related to mean serum/plasma glucose   during the preceding 2-3 months.        10/06/2021 5.4 0.0 - 5.6 % Final     Comment:     Reference Interval:  5.0 - 5.6 Normal   5.7 - 6.4 High Risk   > 6.5 Diabetic      Hgb A1c results are standardized based on the (NGSP) National   Glycohemoglobin Standardization Program.      Hemoglobin A1C levels are related to mean serum/plasma glucose   during the preceding 2-3 months.        10/24/2018 5.1 0.0 - 5.6 % Final     Comment:     Reference Interval:  5.0 - 5.6 Normal   5.7 - 6.4 High Risk   > 6.5 Diabetic    Hgb A1c results are standardized based on the (NGSP) National   Glycohemoglobin Standardization Program.    Hemoglobin A1C levels are related to mean serum/plasma glucose   during the preceding 2-3 months.           Lab Results   Component Value Date    TSH 1.810 11/05/2022    TSH 2.700 07/21/2022    TSH 2.200 12/29/2021     Lab Results   Component Value Date    FREET4 1.12 07/21/2022    FREET4 1.28 12/29/2021    FREET4 1.09 11/07/2020      Lab Results   Component Value Date    LDLCALC 105.6 11/05/2022    LDLCALC 127.2 10/06/2021    LDLCALC 110.2 04/17/2021     Lab Results   Component Value Date    TRIG 127 11/05/2022    TRIG 144 10/06/2021    TRIG 124 04/17/2021            Physical Exam  Constitutional:       Appearance: Normal appearance.   HENT:      Head: Normocephalic and atraumatic.   Eyes:      Extraocular Movements: Extraocular movements intact.      Pupils: Pupils are equal, round, and reactive to light.   Cardiovascular:      Rate and Rhythm: Normal rate.   Pulmonary:      Effort: Pulmonary effort is normal.   Musculoskeletal:      Comments: Positive large varicose vein left leg   Neurological:      Mental Status: She is alert and oriented to person, place, and time.   Psychiatric:         Mood and Affect: Mood normal.       Medication List with Changes/Refills   Current Medications    ALBUTEROL (VENTOLIN HFA) 90 MCG/ACTUATION INHALER    Inhale 2 puffs into the lungs every 6 (six) hours as needed for Wheezing. Rescue    ASPIRIN-ACETAMINOPHEN-CAFFEINE 250-250-65 MG (EXCEDRIN MIGRAINE) 250-250-65 MG PER TABLET    Excedrin Migraine   prn    BENZONATATE (TESSALON) 200 MG CAPSULE    Take 1 capsule (200 mg total) by mouth 2 (two) times daily as needed for Cough.    COENZYME Q10 200 MG CAPSULE        COLESEVELAM (WELCHOL) 625 MG TABLET    Take 1 tablet (625 mg total) by mouth 2 (two) times daily with meals. Diarrhea    DICYCLOMINE (BENTYL) 10 MG CAPSULE    Take 1 capsule (10 mg total) by mouth 4 (four) times daily. Prn stomach cramps    ERGOCALCIFEROL (ERGOCALCIFEROL) 50,000 UNIT CAP    TAKE 1 CAPSULE BY MOUTH ONE TIME PER WEEK    FUROSEMIDE (LASIX) 20 MG TABLET    TAKE 1 TABLET ONCE A DAY AS NEEDED SWELLING    LISDEXAMFETAMINE (VYVANSE) 20 MG CAPSULE    Take 1 capsule (20 mg total) by mouth every morning.    METFORMIN (GLUCOPHAGE-XR) 500 MG ER 24HR TABLET    Take 1 tablet (500 mg total) by mouth daily with breakfast.    MULTIVITAMIN (DAILY  VITAMIN ORAL)        ONDANSETRON (ZOFRAN-ODT) 4 MG TBDL    Take 1 tablet (4 mg total) by mouth every 8 (eight) hours as needed (nausea).    PANTOPRAZOLE (PROTONIX) 40 MG TABLET    TAKE 1 TABLET BY MOUTH EVERY DAY    PRAVASTATIN (PRAVACHOL) 40 MG TABLET    TAKE 1 TABLET BY MOUTH EVERY DAY    PROMETHAZINE (PHENERGAN) 25 MG TABLET    Take 1 tablet (25 mg total) by mouth every 6 (six) hours as needed for Nausea.    VITAMIN E ACETATE (VITAMIN E ORAL)    Take by mouth.   Changed and/or Refilled Medications    Modified Medication Previous Medication    PHENTERMINE (ADIPEX-P) 37.5 MG TABLET phentermine (ADIPEX-P) 37.5 mg tablet       Take 1 tablet (37.5 mg total) by mouth before breakfast.    Take 1 tablet (37.5 mg total) by mouth before breakfast.    TOPIRAMATE (TOPAMAX) 100 MG TABLET topiramate (TOPAMAX) 50 MG tablet       Take 1 tablet (100 mg total) by mouth once daily.    TAKE 1 TABLET BY MOUTH TWICE A DAY   Discontinued Medications    METHYLPHENIDATE HCL (RITALIN) 20 MG TABLET    Take 1 tablet (20 mg total) by mouth once daily.    SEMAGLUTIDE (OZEMPIC) 1 MG/DOSE (4 MG/3 ML)    Inject 1 mg into the skin every 7 days.

## 2023-06-06 ENCOUNTER — OFFICE VISIT (OUTPATIENT)
Dept: OTOLARYNGOLOGY | Facility: CLINIC | Age: 53
End: 2023-06-06
Payer: COMMERCIAL

## 2023-06-06 VITALS — HEIGHT: 67 IN | WEIGHT: 207.25 LBS | TEMPERATURE: 99 F | BODY MASS INDEX: 32.53 KG/M2

## 2023-06-06 DIAGNOSIS — J02.9 SORE THROAT: Primary | ICD-10-CM

## 2023-06-06 DIAGNOSIS — K21.9 LPRD (LARYNGOPHARYNGEAL REFLUX DISEASE): ICD-10-CM

## 2023-06-06 DIAGNOSIS — J02.9 SORE THROAT: ICD-10-CM

## 2023-06-06 PROCEDURE — 99214 PR OFFICE/OUTPT VISIT, EST, LEVL IV, 30-39 MIN: ICD-10-PCS | Mod: S$GLB,,, | Performed by: NURSE PRACTITIONER

## 2023-06-06 PROCEDURE — 1160F RVW MEDS BY RX/DR IN RCRD: CPT | Mod: CPTII,S$GLB,, | Performed by: NURSE PRACTITIONER

## 2023-06-06 PROCEDURE — 1160F PR REVIEW ALL MEDS BY PRESCRIBER/CLIN PHARMACIST DOCUMENTED: ICD-10-PCS | Mod: CPTII,S$GLB,, | Performed by: NURSE PRACTITIONER

## 2023-06-06 PROCEDURE — 99999 PR PBB SHADOW E&M-EST. PATIENT-LVL IV: ICD-10-PCS | Mod: PBBFAC,,, | Performed by: NURSE PRACTITIONER

## 2023-06-06 PROCEDURE — 1159F MED LIST DOCD IN RCRD: CPT | Mod: CPTII,S$GLB,, | Performed by: NURSE PRACTITIONER

## 2023-06-06 PROCEDURE — 3008F BODY MASS INDEX DOCD: CPT | Mod: CPTII,S$GLB,, | Performed by: NURSE PRACTITIONER

## 2023-06-06 PROCEDURE — 3008F PR BODY MASS INDEX (BMI) DOCUMENTED: ICD-10-PCS | Mod: CPTII,S$GLB,, | Performed by: NURSE PRACTITIONER

## 2023-06-06 PROCEDURE — 1159F PR MEDICATION LIST DOCUMENTED IN MEDICAL RECORD: ICD-10-PCS | Mod: CPTII,S$GLB,, | Performed by: NURSE PRACTITIONER

## 2023-06-06 PROCEDURE — 99214 OFFICE O/P EST MOD 30 MIN: CPT | Mod: S$GLB,,, | Performed by: NURSE PRACTITIONER

## 2023-06-06 PROCEDURE — 99999 PR PBB SHADOW E&M-EST. PATIENT-LVL IV: CPT | Mod: PBBFAC,,, | Performed by: NURSE PRACTITIONER

## 2023-06-06 RX ORDER — ESOMEPRAZOLE MAGNESIUM 40 MG/1
40 CAPSULE, DELAYED RELEASE ORAL
Qty: 30 CAPSULE | Refills: 11 | Status: SHIPPED | OUTPATIENT
Start: 2023-06-06 | End: 2023-06-06

## 2023-06-06 RX ORDER — ESOMEPRAZOLE MAGNESIUM 40 MG/1
CAPSULE, DELAYED RELEASE ORAL
Qty: 30 CAPSULE | Refills: 11 | Status: SHIPPED | OUTPATIENT
Start: 2023-06-06

## 2023-06-06 RX ORDER — METHYLPREDNISOLONE 4 MG/1
TABLET ORAL
Qty: 21 TABLET | Refills: 0 | Status: SHIPPED | OUTPATIENT
Start: 2023-06-06 | End: 2023-09-25

## 2023-06-06 RX ORDER — FLUTICASONE PROPIONATE 50 MCG
1 SPRAY, SUSPENSION (ML) NASAL 2 TIMES DAILY
Qty: 16 G | Refills: 12 | Status: SHIPPED | OUTPATIENT
Start: 2023-06-06 | End: 2024-06-05

## 2023-06-06 NOTE — PROGRESS NOTES
Subjective     Patient ID: Siobhan Richards is a 52 y.o. female.    Chief Complaint: Otalgia, Sore Throat, Sinus Problem, and bumps on the roof of mouth    Patient was having bilateral otalgia when she scheduled this appt; however that is now resolved. She is scheduled to fly out tomorrow and wants everything checked out. She has a sore throat though not severe or associated with lymphadenopathy, fever, body aches, etc. Patient having red bumps on roof of mouth X one week. No itching or burning. No fever or rash.     Otalgia   Associated symptoms include headaches, rhinorrhea and a sore throat. Pertinent negatives include no coughing.   Sore Throat   Associated symptoms include ear pain and headaches. Pertinent negatives include no congestion, coughing or trouble swallowing.   Sinus Problem  Associated symptoms include ear pain, headaches and a sore throat. Pertinent negatives include no congestion, coughing, sinus pressure or sneezing.   Review of Systems   Constitutional: Negative.  Negative for fever.   HENT:  Positive for ear pain, facial swelling, rhinorrhea and sore throat. Negative for nasal congestion, dental problem, postnasal drip, sinus pressure/congestion, sneezing, trouble swallowing and voice change.    Eyes:  Positive for discharge (right). Negative for redness and itching.   Respiratory: Negative.  Negative for cough and choking.    Cardiovascular: Negative.    Gastrointestinal: Negative.    Musculoskeletal: Negative.    Integumentary:  Negative.   Neurological:  Positive for headaches.   Hematological: Negative.    Psychiatric/Behavioral: Negative.          Objective     Physical Exam  Vitals and nursing note reviewed.   Constitutional:       General: She is not in acute distress.     Appearance: She is well-developed. She is not ill-appearing or diaphoretic.   HENT:      Head: Normocephalic and atraumatic.      Right Ear: Hearing, tympanic membrane, ear canal and external ear normal. No  middle ear effusion. Tympanic membrane is not erythematous.      Left Ear: Hearing, tympanic membrane, ear canal and external ear normal.  No middle ear effusion. Tympanic membrane is not erythematous.      Nose: Nose normal. No mucosal edema, congestion or rhinorrhea.      Right Turbinates: Pale.      Left Turbinates: Pale.      Right Sinus: No maxillary sinus tenderness or frontal sinus tenderness.      Left Sinus: No maxillary sinus tenderness or frontal sinus tenderness.      Mouth/Throat:      Mouth: Mucous membranes are not pale, not dry and not cyanotic. No oral lesions.      Tongue: No lesions.      Palate: No lesions.      Pharynx: Uvula midline. Pharyngeal swelling (of lateral lymphatic bands) and posterior oropharyngeal erythema present. No oropharyngeal exudate.     Eyes:      General: Lids are normal. No scleral icterus.        Right eye: No discharge.         Left eye: No discharge.   Neck:      Thyroid: No thyroid mass or thyromegaly.      Trachea: Trachea normal. No tracheal deviation.   Cardiovascular:      Rate and Rhythm: Normal rate.   Pulmonary:      Effort: Pulmonary effort is normal. No respiratory distress.      Breath sounds: Normal air entry.   Musculoskeletal:         General: Normal range of motion.      Cervical back: Normal range of motion and neck supple.   Lymphadenopathy:      Head:      Right side of head: No submental, submandibular, tonsillar, preauricular or posterior auricular adenopathy.      Left side of head: No submental, submandibular, tonsillar, preauricular or posterior auricular adenopathy.      Cervical: No cervical adenopathy.      Right cervical: No superficial or posterior cervical adenopathy.     Left cervical: No superficial or posterior cervical adenopathy.   Skin:     General: Skin is warm and dry.      Coloration: Skin is not pale.      Findings: No lesion or rash.   Neurological:      Mental Status: She is alert and oriented to person, place, and time.       Motor: Motor function is intact.      Coordination: Coordination is intact.      Gait: Gait normal.   Psychiatric:         Attention and Perception: Attention normal.         Mood and Affect: Mood normal.         Speech: Speech normal.         Behavior: Behavior normal. Behavior is cooperative.        Assessment and Plan     1. Sore throat  -     methylPREDNISolone (MEDROL DOSEPACK) 4 mg tablet; use as directed  Dispense: 21 tablet; Refill: 0  -     fluticasone propionate (FLONASE) 50 mcg/actuation nasal spray; 1 spray (50 mcg total) by Each Nostril route 2 (two) times daily.  Dispense: 16 g; Refill: 12  -     esomeprazole (NEXIUM) 40 MG capsule; Take 1 capsule (40 mg total) by mouth before breakfast.  Dispense: 30 capsule; Refill: 11    2. LPRD (laryngopharyngeal reflux disease)      MDP and Flonase for AR exacerbation  Change pantoprazole to esomeprazole for 2 months to see if improves symptoms (discussed tachyphylaxis)  No lymphadenopathy, exudate, fever, malaise, etc. to warrant throat swab at this time  Discussed red dot on palate and redness in back of throat likely viral and self-limited   Patient encouraged to return to clinic if symptoms worsen/persist and as needed for further ENT symptoms or concerns.       No follow-ups on file.

## 2023-06-06 NOTE — PATIENT INSTRUCTIONS
"ENT SINUS REGIMEN:    "ENT-APPROVED" NASAL SPRAYS:  Flonase / fluticasone / Nasacort / Rhinocort (steroid spray) is best for stuffy, pressure, fullness.  Astelin / azelastine (antihistamine spray) is best for itchy, drippy, sneezy.  Atrovent / ipratropium is best for chronic watery nasal drip ("leaky faucet" nose), which may worsen with eating.    Use as directed, spraying 1-2 times in each nostril each day. It may take 2-3 days to 2-3 weeks to begin seeing improvement. This medication needs to be taken consistently to see results. Overall, this is a well-tolerated medication with low side effects. The benefit of nasal steroids as opposed to oral steroids is that the nasal steroid spray works primarily in the nose. Common side effects can include: headache, nasal dryness, minor nose bleed.  Rare side effects may include:  septal perforation, elevation in eye pressure, dry eyes, change in smell, allergic reaction.  Notify your provider if you have any concerns or experience these symptoms.     Nasal spray instructions:  Blow nose first gently to clean. Keep chin level with the floor (do not tilt head forward or back). Using the opposite hand (example: right hand for left nostril, left hand for right nostril) insert nasal spray taking caution to direct it AWAY from the middle wall inside the nose (septum) to avoid irritating nasal septum which could cause nosebleed.  Do not tilt spray up but rather flat and out along the roof of your mouth to spray. Angle the tip of the spray out slightly toward the direction of the ears; then spray. Do not take quick vigorous sniff but rather slow gentle inhalation while waiting for medication to absorb into nasal passages. Then administer second spray in same way.     Nasal saline rinse kit (use Neti pot or [employer] sinus rinse kit) -- Rinse your sinuses once to twice daily to reduce the allergen burden in your nose. Use sterile water (boiled tap water which has cooled) or distilled " "bottled water. Add 1/4 teaspoon sea salt and a pinch of baking soda or a mixture packet from the maker of your sinus rinse kit.  Rinse through both sides of nose to cleanse sinus and nasal passages, bending forward with head tilted down. Keep your mouth open, without holding your breath. Squeeze bottle gently until solution starts draining from the opposite nasal passage. After bottle is empty, blow nose very gently, without pinching nose completely, to avoid pressure on eardrums.  There are useful YouTube videos that show demonstration of how to do these properly.     Ponaris Nasal Emollient is used for the relief of: nasal congestion due to colds, nasal irritation, allergy exacerbations, nasal crusting. Specifically prepared iodized organic oils of pine, eucalyptus, peppermint, cajeput, and cottonseed. To order Ponaris: ask your pharmacist to order it for you or we carry it in our pharmacy downstairs on the first floor.      When flying:  Take Sudafed 60 mg 1 hour prior to flying. Afrin nasal spray 30 minutes before take-off. Chew gum/pop ears gently when taking off and coming down. Drink water. Use "Airplanes" or [employer] "Flight Mates" ear plugs designed for flying (available on [employer]).     "

## 2023-07-13 LAB
HUMAN PAPILLOMAVIRUS (HPV): NORMAL
PAP RECOMMENDATION EXT: NORMAL
PAP SMEAR: NORMAL

## 2023-07-18 ENCOUNTER — PATIENT MESSAGE (OUTPATIENT)
Dept: FAMILY MEDICINE | Facility: CLINIC | Age: 53
End: 2023-07-18
Payer: COMMERCIAL

## 2023-07-18 DIAGNOSIS — M79.89 SOFT TISSUE MASS: Primary | ICD-10-CM

## 2023-07-20 ENCOUNTER — PATIENT MESSAGE (OUTPATIENT)
Dept: FAMILY MEDICINE | Facility: CLINIC | Age: 53
End: 2023-07-20
Payer: COMMERCIAL

## 2023-08-13 ENCOUNTER — PATIENT MESSAGE (OUTPATIENT)
Dept: FAMILY MEDICINE | Facility: CLINIC | Age: 53
End: 2023-08-13
Payer: COMMERCIAL

## 2023-08-15 NOTE — TELEPHONE ENCOUNTER
Schedule her VV on Friday to get this restarted it been while since she has had it     Open spot is fine

## 2023-08-21 ENCOUNTER — OFFICE VISIT (OUTPATIENT)
Dept: FAMILY MEDICINE | Facility: CLINIC | Age: 53
End: 2023-08-21
Payer: COMMERCIAL

## 2023-08-21 DIAGNOSIS — F98.8 ADD (ATTENTION DEFICIT DISORDER) WITHOUT HYPERACTIVITY: ICD-10-CM

## 2023-08-21 DIAGNOSIS — R53.83 FATIGUE, UNSPECIFIED TYPE: Primary | ICD-10-CM

## 2023-08-21 PROCEDURE — 99213 PR OFFICE/OUTPT VISIT, EST, LEVL III, 20-29 MIN: ICD-10-PCS | Mod: 95,,, | Performed by: INTERNAL MEDICINE

## 2023-08-21 PROCEDURE — 99213 OFFICE O/P EST LOW 20 MIN: CPT | Mod: 95,,, | Performed by: INTERNAL MEDICINE

## 2023-08-21 PROCEDURE — 1160F PR REVIEW ALL MEDS BY PRESCRIBER/CLIN PHARMACIST DOCUMENTED: ICD-10-PCS | Mod: CPTII,95,, | Performed by: INTERNAL MEDICINE

## 2023-08-21 PROCEDURE — 1160F RVW MEDS BY RX/DR IN RCRD: CPT | Mod: CPTII,95,, | Performed by: INTERNAL MEDICINE

## 2023-08-21 PROCEDURE — 1159F MED LIST DOCD IN RCRD: CPT | Mod: CPTII,95,, | Performed by: INTERNAL MEDICINE

## 2023-08-21 PROCEDURE — 1159F PR MEDICATION LIST DOCUMENTED IN MEDICAL RECORD: ICD-10-PCS | Mod: CPTII,95,, | Performed by: INTERNAL MEDICINE

## 2023-08-21 RX ORDER — DEXTROAMPHETAMINE SACCHARATE, AMPHETAMINE ASPARTATE, DEXTROAMPHETAMINE SULFATE AND AMPHETAMINE SULFATE 5; 5; 5; 5 MG/1; MG/1; MG/1; MG/1
1 TABLET ORAL 2 TIMES DAILY
Qty: 60 TABLET | Refills: 0 | Status: SHIPPED | OUTPATIENT
Start: 2023-08-21 | End: 2023-09-25 | Stop reason: SDUPTHER

## 2023-08-21 RX ORDER — LISDEXAMFETAMINE DIMESYLATE CAPSULES 20 MG/1
20 CAPSULE ORAL EVERY MORNING
Qty: 30 CAPSULE | Refills: 0 | Status: SHIPPED | OUTPATIENT
Start: 2023-09-20

## 2023-08-21 NOTE — PROGRESS NOTES
Subjective:     The patient location is: [place]  The chief complaint leading to consultation is: med refill     Visit type: audiovisual    Face to Face time with patient: 20 minutes of total time spent on the encounter, which includes face to face time and non-face to face time preparing to see the patient (eg, review of tests), Obtaining and/or reviewing separately obtained history, Documenting clinical information in the electronic or other health record, Independently interpreting results (not separately reported) and communicating results to the patient/family/caregiver, or Care coordination (not separately reported).         Each patient to whom he or she provides medical services by telemedicine is:  (1) informed of the relationship between the physician and patient and the respective role of any other health care provider with respect to management of the patient; and (2) notified that he or she may decline to receive medical services by telemedicine and may withdraw from such care at any time.     Patient ID: Siobhan Richards is a 52 y.o. female.    Chief Complaint: Follow-up   HPI    Gyn:  Dr. Camilo / last menses year ago   MM2022  Dexa:  2020 normal  Colonoscopy:  19 cologaurd negative / due    Immunizations:  Tdap: check old note Pneumonia:  Shingles: no  Covid: yes   Smoker:  Former  Get old note             Left lower leg post thigh mild pain- notices more sitting. + VV. seen cardio for eval     ADD: like refill Adderall 20 mg - working fulltime again uses it on shorter day & Vyvanse longer days. Rx prn work days Vyvanse 20, Adderall 20 IR     Glucose intolerance:  G 95 // Rx Metformin QD. Insurance wont cover shots.    //   A1C 5.1               (Highest 5.1 and 107)         IBS D: urgency/diarrhea -since GB out.   Tired Welchol ? If helped      Metabolic syndrome: BMI started 37, BMI 33 & 212, 32 & 207. /  Rx Adipex #2 off //  Topamax 50 .   Improved headaches.        GERD: small HH controlled Rx: Protonix 40      Migraines: improved Rx Topamax 50  mg//ear pain is s/s //  p.r.n. medicines     Leg swelling: Rx prn lasix      Mixed HLD: controlled : Rx pravastatin 40.       Anxiety: hx of out of blue mild panic, Driving anxiety. Rx  prn Buspar.  Prev took wellbutrin doesn't want anything take causes weight gain.      Vitamin-D deficiency: Rx Vit D2 weekly      Allergies:  + tree, grass.- Dr Dominguez recommended shots. Deferred rx.  Pnx titers all low.   ____________________________________________________________________________________________________  Assessment & Plan:  1. Fatigue, unspecified type    2. ADD (attention deficit disorder) without hyperactivity  - dextroamphetamine-amphetamine (ADDERALL) 20 mg tablet; Take 1 tablet by mouth 2 (two) times a day. # 1  Dispense: 60 tablet; Refill: 0  - lisdexamfetamine (VYVANSE) 20 MG capsule; Take 1 capsule (20 mg total) by mouth every morning. #2  Dispense: 30 capsule; Refill: 0     Fatigue, unspecified type    ADD (attention deficit disorder) without hyperactivity  -     dextroamphetamine-amphetamine (ADDERALL) 20 mg tablet; Take 1 tablet by mouth 2 (two) times a day. # 1  Dispense: 60 tablet; Refill: 0  -     lisdexamfetamine (VYVANSE) 20 MG capsule; Take 1 capsule (20 mg total) by mouth every morning. #2  Dispense: 30 capsule; Refill: 0        Continue to work on regular exercise, maintain healthy weight, balanced diet. Avoid unhealthy habits: smoking, excessive alcohol intake.     Disclaimer: This note was partly generated using dictation software which may occasionally result in transcription errors  ____________________________________________________________________________________________________  Review of Systems:  Review of Systems   Constitutional:  Negative for appetite change.   HENT:  Negative for nosebleeds.    Eyes:  Negative for pain.   Respiratory:  Negative for choking.    Cardiovascular:  Negative for chest  pain.   Gastrointestinal:  Negative for blood in stool.   Genitourinary:  Negative for hematuria.   Musculoskeletal:  Negative for joint swelling.   Skin:  Negative for pallor.   Neurological:  Negative for facial asymmetry.   Hematological:  Does not bruise/bleed easily.   Psychiatric/Behavioral:  Negative for confusion.        Objective:     Wt Readings from Last 3 Encounters:   06/06/23 94 kg (207 lb 3.7 oz)   05/25/23 94.3 kg (207 lb 12.5 oz)   03/20/23 96.5 kg (212 lb 11.9 oz)     BP Readings from Last 3 Encounters:   05/25/23 124/84   03/20/23 118/78   11/09/22 102/74       Lab Results   Component Value Date    WBC 10.44 11/05/2022    HGB 14.6 11/05/2022    HCT 43.6 11/05/2022     11/05/2022     11/05/2022    K 4.9 11/05/2022     11/05/2022    ALT 16 11/05/2022    AST 19 11/05/2022    CO2 27 11/05/2022    CREATININE 0.83 11/05/2022    BUN 12 11/05/2022    GLU 95 11/05/2022      Hemoglobin A1C   Date Value Ref Range Status   11/05/2022 5.1 0.0 - 5.6 % Final     Comment:     Reference Interval:  5.0 - 5.6 Normal   5.7 - 6.4 High Risk   > 6.5 Diabetic      Hgb A1c results are standardized based on the (NGSP) National   Glycohemoglobin Standardization Program.      Hemoglobin A1C levels are related to mean serum/plasma glucose   during the preceding 2-3 months.        10/06/2021 5.4 0.0 - 5.6 % Final     Comment:     Reference Interval:  5.0 - 5.6 Normal   5.7 - 6.4 High Risk   > 6.5 Diabetic      Hgb A1c results are standardized based on the (NGSP) National   Glycohemoglobin Standardization Program.      Hemoglobin A1C levels are related to mean serum/plasma glucose   during the preceding 2-3 months.        10/24/2018 5.1 0.0 - 5.6 % Final     Comment:     Reference Interval:  5.0 - 5.6 Normal   5.7 - 6.4 High Risk   > 6.5 Diabetic    Hgb A1c results are standardized based on the (NGSP) National   Glycohemoglobin Standardization Program.    Hemoglobin A1C levels are related to mean serum/plasma  glucose   during the preceding 2-3 months.           Lab Results   Component Value Date    TSH 1.810 11/05/2022    TSH 2.700 07/21/2022    TSH 2.200 12/29/2021     Lab Results   Component Value Date    FREET4 1.12 07/21/2022    FREET4 1.28 12/29/2021    FREET4 1.09 11/07/2020     Lab Results   Component Value Date    LDLCALC 105.6 11/05/2022    LDLCALC 127.2 10/06/2021    LDLCALC 110.2 04/17/2021     Lab Results   Component Value Date    TRIG 127 11/05/2022    TRIG 144 10/06/2021    TRIG 124 04/17/2021            Physical Exam  Constitutional:       Appearance: Normal appearance.   Neurological:      Mental Status: She is alert.         Medication List with Changes/Refills   New Medications    DEXTROAMPHETAMINE-AMPHETAMINE (ADDERALL) 20 MG TABLET    Take 1 tablet by mouth 2 (two) times a day. # 1   Current Medications    ALBUTEROL (VENTOLIN HFA) 90 MCG/ACTUATION INHALER    Inhale 2 puffs into the lungs every 6 (six) hours as needed for Wheezing. Rescue    ASPIRIN-ACETAMINOPHEN-CAFFEINE 250-250-65 MG (EXCEDRIN MIGRAINE) 250-250-65 MG PER TABLET    Excedrin Migraine   prn    BENZONATATE (TESSALON) 200 MG CAPSULE    Take 1 capsule (200 mg total) by mouth 2 (two) times daily as needed for Cough.    COENZYME Q10 200 MG CAPSULE        COLESEVELAM (WELCHOL) 625 MG TABLET    Take 1 tablet (625 mg total) by mouth 2 (two) times daily with meals. Diarrhea    DICYCLOMINE (BENTYL) 10 MG CAPSULE    Take 1 capsule (10 mg total) by mouth 4 (four) times daily. Prn stomach cramps    ERGOCALCIFEROL (ERGOCALCIFEROL) 50,000 UNIT CAP    TAKE 1 CAPSULE BY MOUTH ONE TIME PER WEEK    ESOMEPRAZOLE (NEXIUM) 40 MG CAPSULE    TAKE 1 CAPSULE BY MOUTH BEFORE BREAKFAST.    FLUTICASONE PROPIONATE (FLONASE) 50 MCG/ACTUATION NASAL SPRAY    1 spray (50 mcg total) by Each Nostril route 2 (two) times daily.    FUROSEMIDE (LASIX) 20 MG TABLET    TAKE 1 TABLET ONCE A DAY AS NEEDED SWELLING    METFORMIN (GLUCOPHAGE-XR) 500 MG ER 24HR TABLET    Take 1  tablet (500 mg total) by mouth daily with breakfast.    METHYLPREDNISOLONE (MEDROL DOSEPACK) 4 MG TABLET    use as directed    MULTIVITAMIN (DAILY VITAMIN ORAL)        ONDANSETRON (ZOFRAN-ODT) 4 MG TBDL    Take 1 tablet (4 mg total) by mouth every 8 (eight) hours as needed (nausea).    PRAVASTATIN (PRAVACHOL) 40 MG TABLET    TAKE 1 TABLET BY MOUTH EVERY DAY    PROMETHAZINE (PHENERGAN) 25 MG TABLET    Take 1 tablet (25 mg total) by mouth every 6 (six) hours as needed for Nausea.    TOPIRAMATE (TOPAMAX) 100 MG TABLET    Take 1 tablet (100 mg total) by mouth once daily.    VITAMIN E ACETATE (VITAMIN E ORAL)    Take by mouth.   Changed and/or Refilled Medications    Modified Medication Previous Medication    LISDEXAMFETAMINE (VYVANSE) 20 MG CAPSULE lisdexamfetamine (VYVANSE) 20 MG capsule       Take 1 capsule (20 mg total) by mouth every morning. #2    Take 1 capsule (20 mg total) by mouth every morning.

## 2023-09-18 ENCOUNTER — PATIENT MESSAGE (OUTPATIENT)
Dept: FAMILY MEDICINE | Facility: CLINIC | Age: 53
End: 2023-09-18
Payer: COMMERCIAL

## 2023-09-25 ENCOUNTER — OFFICE VISIT (OUTPATIENT)
Dept: FAMILY MEDICINE | Facility: CLINIC | Age: 53
End: 2023-09-25
Payer: COMMERCIAL

## 2023-09-25 VITALS
DIASTOLIC BLOOD PRESSURE: 84 MMHG | WEIGHT: 219.44 LBS | HEART RATE: 79 BPM | RESPIRATION RATE: 16 BRPM | HEIGHT: 67 IN | BODY MASS INDEX: 34.44 KG/M2 | OXYGEN SATURATION: 97 % | SYSTOLIC BLOOD PRESSURE: 132 MMHG

## 2023-09-25 DIAGNOSIS — E66.01 SEVERE OBESITY (BMI 35.0-39.9) WITH COMORBIDITY: Primary | ICD-10-CM

## 2023-09-25 DIAGNOSIS — F41.9 ANXIETY: ICD-10-CM

## 2023-09-25 DIAGNOSIS — E66.9 OBESITY (BMI 30.0-34.9): ICD-10-CM

## 2023-09-25 DIAGNOSIS — F98.8 ADD (ATTENTION DEFICIT DISORDER) WITHOUT HYPERACTIVITY: ICD-10-CM

## 2023-09-25 DIAGNOSIS — R45.4 IRRITABILITY: ICD-10-CM

## 2023-09-25 PROCEDURE — 99214 OFFICE O/P EST MOD 30 MIN: CPT | Mod: S$GLB,,, | Performed by: INTERNAL MEDICINE

## 2023-09-25 PROCEDURE — 3075F SYST BP GE 130 - 139MM HG: CPT | Mod: CPTII,S$GLB,, | Performed by: INTERNAL MEDICINE

## 2023-09-25 PROCEDURE — 3008F BODY MASS INDEX DOCD: CPT | Mod: CPTII,S$GLB,, | Performed by: INTERNAL MEDICINE

## 2023-09-25 PROCEDURE — 1159F PR MEDICATION LIST DOCUMENTED IN MEDICAL RECORD: ICD-10-PCS | Mod: CPTII,S$GLB,, | Performed by: INTERNAL MEDICINE

## 2023-09-25 PROCEDURE — 99999 PR PBB SHADOW E&M-EST. PATIENT-LVL III: ICD-10-PCS | Mod: PBBFAC,,, | Performed by: INTERNAL MEDICINE

## 2023-09-25 PROCEDURE — 1160F RVW MEDS BY RX/DR IN RCRD: CPT | Mod: CPTII,S$GLB,, | Performed by: INTERNAL MEDICINE

## 2023-09-25 PROCEDURE — 99214 PR OFFICE/OUTPT VISIT, EST, LEVL IV, 30-39 MIN: ICD-10-PCS | Mod: S$GLB,,, | Performed by: INTERNAL MEDICINE

## 2023-09-25 PROCEDURE — 1159F MED LIST DOCD IN RCRD: CPT | Mod: CPTII,S$GLB,, | Performed by: INTERNAL MEDICINE

## 2023-09-25 PROCEDURE — 3008F PR BODY MASS INDEX (BMI) DOCUMENTED: ICD-10-PCS | Mod: CPTII,S$GLB,, | Performed by: INTERNAL MEDICINE

## 2023-09-25 PROCEDURE — 1160F PR REVIEW ALL MEDS BY PRESCRIBER/CLIN PHARMACIST DOCUMENTED: ICD-10-PCS | Mod: CPTII,S$GLB,, | Performed by: INTERNAL MEDICINE

## 2023-09-25 PROCEDURE — 3075F PR MOST RECENT SYSTOLIC BLOOD PRESS GE 130-139MM HG: ICD-10-PCS | Mod: CPTII,S$GLB,, | Performed by: INTERNAL MEDICINE

## 2023-09-25 PROCEDURE — 3079F DIAST BP 80-89 MM HG: CPT | Mod: CPTII,S$GLB,, | Performed by: INTERNAL MEDICINE

## 2023-09-25 PROCEDURE — 99999 PR PBB SHADOW E&M-EST. PATIENT-LVL III: CPT | Mod: PBBFAC,,, | Performed by: INTERNAL MEDICINE

## 2023-09-25 PROCEDURE — 3079F PR MOST RECENT DIASTOLIC BLOOD PRESSURE 80-89 MM HG: ICD-10-PCS | Mod: CPTII,S$GLB,, | Performed by: INTERNAL MEDICINE

## 2023-09-25 RX ORDER — DEXTROAMPHETAMINE SACCHARATE, AMPHETAMINE ASPARTATE, DEXTROAMPHETAMINE SULFATE AND AMPHETAMINE SULFATE 5; 5; 5; 5 MG/1; MG/1; MG/1; MG/1
1 TABLET ORAL 2 TIMES DAILY
Qty: 60 TABLET | Refills: 0 | Status: SHIPPED | OUTPATIENT
Start: 2023-10-25 | End: 2023-11-24

## 2023-09-25 RX ORDER — BUPROPION HYDROCHLORIDE 150 MG/1
150 TABLET ORAL DAILY
Qty: 30 TABLET | Refills: 2 | Status: SHIPPED | OUTPATIENT
Start: 2023-09-25 | End: 2023-10-26

## 2023-09-25 RX ORDER — DEXTROAMPHETAMINE SACCHARATE, AMPHETAMINE ASPARTATE, DEXTROAMPHETAMINE SULFATE AND AMPHETAMINE SULFATE 5; 5; 5; 5 MG/1; MG/1; MG/1; MG/1
1 TABLET ORAL 2 TIMES DAILY
Qty: 60 TABLET | Refills: 0 | Status: SHIPPED | OUTPATIENT
Start: 2023-11-24 | End: 2023-12-24

## 2023-09-25 RX ORDER — SEMAGLUTIDE 0.25 MG/.5ML
0.25 INJECTION, SOLUTION SUBCUTANEOUS
Qty: 4 EACH | Refills: 0 | Status: SHIPPED | OUTPATIENT
Start: 2023-09-25

## 2023-09-25 RX ORDER — DEXTROAMPHETAMINE SACCHARATE, AMPHETAMINE ASPARTATE, DEXTROAMPHETAMINE SULFATE AND AMPHETAMINE SULFATE 5; 5; 5; 5 MG/1; MG/1; MG/1; MG/1
1 TABLET ORAL 2 TIMES DAILY
Qty: 60 TABLET | Refills: 0 | Status: SHIPPED | OUTPATIENT
Start: 2023-09-25

## 2023-09-25 NOTE — PROGRESS NOTES
Subjective:       Patient ID: Siobhan Richards is a 52 y.o. female.    Chief Complaint: Follow-up   Anxiety  Presents for initial visit. The problem has been waxing and waning. Symptoms include decreased concentration, irritability and nervous/anxious behavior. Patient reports no chest pain or confusion. Symptoms occur most days. The severity of symptoms is mild. The quality of sleep is good. Nighttime awakenings: occasional.          Hx of Anxiety: hx of out of blue mild panic, Driving anxiety. Rx  prn Buspar.    Prev took wellbutrin doesn't want anything take causes weight gain.       Gyn:  Dr. Camilo / last menses year ago   MM2022  Dexa:  2020 normal  Colonoscopy:  19 cologaurd negative / due    Immunizations:  Tdap: check old note Pneumonia:  Shingles: no  Covid: yes   Smoker:  Former  Get old note             ADD:  FL:  controlled Rx prn  Adderall 20 mg - working fulltime again uses it on shorter day & Vyvanse longer days.   Rx prn work days Vyvanse 20, Adderall 20 IR     Glucose intolerance:  G 95 // Rx Metformin QD. Insurance wont cover shots.    //   A1C 5.1               (Highest 5.1 and 107)         IBS D: urgency/diarrhea -since GB out.   Tired Welchol ? If helped      Metabolic syndrome: BMI started 37, BMI 33 & 212, 32 & 207. /  Rx Adipex #2 off //  Topamax 50 .   Improved headaches.       GERD: small HH controlled Rx: Protonix 40      Migraines: improved Rx Topamax 50      Leg swelling: controlled Rx prn lasix      Mixed HLD: controlled : Rx Pravastatin 40.       Vitamin-D deficiency: controlled  Rx Vit D2 weekly      Allergies:  + tree, grass.- Dr Dominguez recommended shots. Deferred rx.  Pnx titers all low.   ____________________________________________________________________________________________________  ____________________________________________________________________________________________________  Assessment & Plan:  1. Severe obesity (BMI  35.0-39.9) with comorbidity    2. Obesity (BMI 30.0-34.9)  - semaglutide, weight loss, (WEGOVY) 0.25 mg/0.5 mL PnIj; Inject 0.25 mg into the skin every 7 days.  Dispense: 4 each; Refill: 0    3. Irritability  - buPROPion (WELLBUTRIN XL) 150 MG TB24 tablet; Take 1 tablet (150 mg total) by mouth once daily.  Dispense: 30 tablet; Refill: 2    4. Anxiety  - buPROPion (WELLBUTRIN XL) 150 MG TB24 tablet; Take 1 tablet (150 mg total) by mouth once daily.  Dispense: 30 tablet; Refill: 2    5. ADD (attention deficit disorder) without hyperactivity  - dextroamphetamine-amphetamine (ADDERALL) 20 mg tablet; Take 1 tablet by mouth 2 (two) times a day. # 1  Dispense: 60 tablet; Refill: 0  - dextroamphetamine-amphetamine (ADDERALL) 20 mg tablet; Take 1 tablet by mouth 2 (two) times daily.  Dispense: 60 tablet; Refill: 0  - dextroamphetamine-amphetamine (ADDERALL) 20 mg tablet; Take 1 tablet by mouth 2 (two) times daily. # 3 due visit for refills  Dispense: 60 tablet; Refill: 0     Severe obesity (BMI 35.0-39.9) with comorbidity    Obesity (BMI 30.0-34.9)  -     semaglutide, weight loss, (WEGOVY) 0.25 mg/0.5 mL PnIj; Inject 0.25 mg into the skin every 7 days.  Dispense: 4 each; Refill: 0    Irritability  -     buPROPion (WELLBUTRIN XL) 150 MG TB24 tablet; Take 1 tablet (150 mg total) by mouth once daily.  Dispense: 30 tablet; Refill: 2    Anxiety  -     buPROPion (WELLBUTRIN XL) 150 MG TB24 tablet; Take 1 tablet (150 mg total) by mouth once daily.  Dispense: 30 tablet; Refill: 2    ADD (attention deficit disorder) without hyperactivity  -     dextroamphetamine-amphetamine (ADDERALL) 20 mg tablet; Take 1 tablet by mouth 2 (two) times a day. # 1  Dispense: 60 tablet; Refill: 0  -     dextroamphetamine-amphetamine (ADDERALL) 20 mg tablet; Take 1 tablet by mouth 2 (two) times daily.  Dispense: 60 tablet; Refill: 0  -     dextroamphetamine-amphetamine (ADDERALL) 20 mg tablet; Take 1 tablet by mouth 2 (two) times daily. # 3 due visit for  refills  Dispense: 60 tablet; Refill: 0        Continue to work on regular exercise, maintain healthy weight, balanced diet. Avoid unhealthy habits: smoking, excessive alcohol intake.     Disclaimer: This note was partly generated using dictation software which may occasionally result in transcription errors  ____________________________________________________________________________________________________  Review of Systems:  Review of Systems   Constitutional:  Positive for irritability. Negative for appetite change.   HENT:  Negative for nosebleeds.    Eyes:  Negative for pain.   Respiratory:  Negative for choking.    Cardiovascular:  Negative for chest pain.   Gastrointestinal:  Negative for blood in stool.   Genitourinary:  Negative for hematuria.   Musculoskeletal:  Negative for joint swelling.   Skin:  Negative for pallor.   Neurological:  Negative for facial asymmetry.   Hematological:  Does not bruise/bleed easily.   Psychiatric/Behavioral:  Positive for decreased concentration. Negative for confusion. The patient is nervous/anxious.        Objective:     Wt Readings from Last 3 Encounters:   09/25/23 99.6 kg (219 lb 7.5 oz)   06/06/23 94 kg (207 lb 3.7 oz)   05/25/23 94.3 kg (207 lb 12.5 oz)     BP Readings from Last 3 Encounters:   09/25/23 132/84   05/25/23 124/84   03/20/23 118/78       Lab Results   Component Value Date    WBC 10.44 11/05/2022    HGB 14.6 11/05/2022    HCT 43.6 11/05/2022     11/05/2022     11/05/2022    K 4.9 11/05/2022     11/05/2022    ALT 16 11/05/2022    AST 19 11/05/2022    CO2 27 11/05/2022    CREATININE 0.83 11/05/2022    BUN 12 11/05/2022    GLU 95 11/05/2022      Hemoglobin A1C   Date Value Ref Range Status   11/05/2022 5.1 0.0 - 5.6 % Final     Comment:     Reference Interval:  5.0 - 5.6 Normal   5.7 - 6.4 High Risk   > 6.5 Diabetic      Hgb A1c results are standardized based on the (NGSP) National   Glycohemoglobin Standardization Program.       Hemoglobin A1C levels are related to mean serum/plasma glucose   during the preceding 2-3 months.        10/06/2021 5.4 0.0 - 5.6 % Final     Comment:     Reference Interval:  5.0 - 5.6 Normal   5.7 - 6.4 High Risk   > 6.5 Diabetic      Hgb A1c results are standardized based on the (NGSP) National   Glycohemoglobin Standardization Program.      Hemoglobin A1C levels are related to mean serum/plasma glucose   during the preceding 2-3 months.        10/24/2018 5.1 0.0 - 5.6 % Final     Comment:     Reference Interval:  5.0 - 5.6 Normal   5.7 - 6.4 High Risk   > 6.5 Diabetic    Hgb A1c results are standardized based on the (NGSP) National   Glycohemoglobin Standardization Program.    Hemoglobin A1C levels are related to mean serum/plasma glucose   during the preceding 2-3 months.           Lab Results   Component Value Date    TSH 1.810 11/05/2022    TSH 2.700 07/21/2022    TSH 2.200 12/29/2021     Lab Results   Component Value Date    FREET4 1.12 07/21/2022    FREET4 1.28 12/29/2021    FREET4 1.09 11/07/2020     Lab Results   Component Value Date    LDLCALC 105.6 11/05/2022    LDLCALC 127.2 10/06/2021    LDLCALC 110.2 04/17/2021     Lab Results   Component Value Date    TRIG 127 11/05/2022    TRIG 144 10/06/2021    TRIG 124 04/17/2021            Physical Exam  Constitutional:       Appearance: Normal appearance.   HENT:      Head: Normocephalic and atraumatic.   Eyes:      Extraocular Movements: Extraocular movements intact.      Pupils: Pupils are equal, round, and reactive to light.   Cardiovascular:      Rate and Rhythm: Normal rate and regular rhythm.   Pulmonary:      Effort: Pulmonary effort is normal.   Neurological:      Mental Status: She is alert.       Medication List with Changes/Refills   New Medications    BUPROPION (WELLBUTRIN XL) 150 MG TB24 TABLET    Take 1 tablet (150 mg total) by mouth once daily.    DEXTROAMPHETAMINE-AMPHETAMINE (ADDERALL) 20 MG TABLET    Take 1 tablet by mouth 2 (two) times  daily.    DEXTROAMPHETAMINE-AMPHETAMINE (ADDERALL) 20 MG TABLET    Take 1 tablet by mouth 2 (two) times daily. # 3 due visit for refills    SEMAGLUTIDE, WEIGHT LOSS, (WEGOVY) 0.25 MG/0.5 ML PNIJ    Inject 0.25 mg into the skin every 7 days.   Current Medications    ALBUTEROL (VENTOLIN HFA) 90 MCG/ACTUATION INHALER    Inhale 2 puffs into the lungs every 6 (six) hours as needed for Wheezing. Rescue    ASPIRIN-ACETAMINOPHEN-CAFFEINE 250-250-65 MG (EXCEDRIN MIGRAINE) 250-250-65 MG PER TABLET    Excedrin Migraine   prn    BENZONATATE (TESSALON) 200 MG CAPSULE    Take 1 capsule (200 mg total) by mouth 2 (two) times daily as needed for Cough.    COENZYME Q10 200 MG CAPSULE        COLESEVELAM (WELCHOL) 625 MG TABLET    Take 1 tablet (625 mg total) by mouth 2 (two) times daily with meals. Diarrhea    DICYCLOMINE (BENTYL) 10 MG CAPSULE    Take 1 capsule (10 mg total) by mouth 4 (four) times daily. Prn stomach cramps    ERGOCALCIFEROL (ERGOCALCIFEROL) 50,000 UNIT CAP    TAKE 1 CAPSULE BY MOUTH ONE TIME PER WEEK    ESOMEPRAZOLE (NEXIUM) 40 MG CAPSULE    TAKE 1 CAPSULE BY MOUTH BEFORE BREAKFAST.    FLUTICASONE PROPIONATE (FLONASE) 50 MCG/ACTUATION NASAL SPRAY    1 spray (50 mcg total) by Each Nostril route 2 (two) times daily.    FUROSEMIDE (LASIX) 20 MG TABLET    TAKE 1 TABLET ONCE A DAY AS NEEDED SWELLING    LISDEXAMFETAMINE (VYVANSE) 20 MG CAPSULE    Take 1 capsule (20 mg total) by mouth every morning. #2    METFORMIN (GLUCOPHAGE-XR) 500 MG ER 24HR TABLET    Take 1 tablet (500 mg total) by mouth daily with breakfast.    MULTIVITAMIN (DAILY VITAMIN ORAL)        ONDANSETRON (ZOFRAN-ODT) 4 MG TBDL    Take 1 tablet (4 mg total) by mouth every 8 (eight) hours as needed (nausea).    PRAVASTATIN (PRAVACHOL) 40 MG TABLET    TAKE 1 TABLET BY MOUTH EVERY DAY    PROMETHAZINE (PHENERGAN) 25 MG TABLET    Take 1 tablet (25 mg total) by mouth every 6 (six) hours as needed for Nausea.    TOPIRAMATE (TOPAMAX) 100 MG TABLET    Take 1 tablet  (100 mg total) by mouth once daily.    VITAMIN E ACETATE (VITAMIN E ORAL)    Take by mouth.   Changed and/or Refilled Medications    Modified Medication Previous Medication    DEXTROAMPHETAMINE-AMPHETAMINE (ADDERALL) 20 MG TABLET dextroamphetamine-amphetamine (ADDERALL) 20 mg tablet       Take 1 tablet by mouth 2 (two) times a day. # 1    Take 1 tablet by mouth 2 (two) times a day. # 1   Discontinued Medications    METHYLPREDNISOLONE (MEDROL DOSEPACK) 4 MG TABLET    use as directed

## 2023-09-26 ENCOUNTER — PATIENT OUTREACH (OUTPATIENT)
Dept: ADMINISTRATIVE | Facility: HOSPITAL | Age: 53
End: 2023-09-26
Payer: COMMERCIAL

## 2023-09-26 NOTE — PROGRESS NOTES
Population Health Chart Review & Patient Outreach Details:     Reason for Outreach Encounter:     [x]  Non-Compliant Report   []  Payor Report ([employer], PHN, BCBS, MSSP, MCIP, UHC, etc.)   []  Pre-Visit Chart Review     Updates Requested / Reviewed:     []  Care Everywhere    [x]     [x]  External Sources ([facility], [facility], DIS, etc.)   []  Care Team Updated    Patient Outreach Method:    []  Telephone Outreach Completed   [] Successful   [] Left Voicemail   [] Unable to Contact (wrong number, no voicemail)  []  [facility] Outreach Sent  []  Letter Outreach Mailed  []  Fax Sent for External Records  [x]  External Records Upload    Health Maintenance Topics Addressed and Outreach Outcomes / Actions Taken:        []      Breast Cancer Screening []  Mammo Scheduled      []  External Records Requested     []  Added Reminder to Complete to Upcoming Primary Care Appt Notes     []  Patient Declined     []  Patient Will Call Back to Schedule     []  Patient Will Schedule with External Provider / Order Routed if Applicable             [x]       Cervical Cancer Screening []  Pap Scheduled      []  External Records Requested     []  Added Reminder to Complete to Upcoming Primary Care Appt Notes     []  Patient Declined     []  Patient Will Call Back to Schedule     []  Patient Will Schedule with External Provider               []          Colorectal Cancer Screening []  Colonoscopy Case Request or Referral Placed     []  External Records Requested     []  Added Reminder to Complete to Upcoming Primary Care Appt Notes     []  Patient Declined     []  Patient Will Call Back to Schedule     []  Patient Will Schedule with External Provider     []  Fit Kit Mailed (add the SmartPhrase under additional notes)     []  Reminded Patient to Complete Home Test             []      Diabetic Eye Exam []  Eye Camera Scheduled or Optometry Referral Placed     []  External Records Requested     []  Added Reminder to Complete to  Upcoming Primary Care Appt Notes     []  Patient Declined     []  Patient Will Call Back to Schedule     []  Patient Will Schedule with External Provider             []      Blood Pressure Control []  Primary Care Follow Up Visit Scheduled     []  Remote Blood Pressure Reading Captured     []  Added Reminder to Complete to Upcoming Primary Care Appt Notes     []  Patient Declined     []  Patient Will Call Back / Patient Will Send Portal Message with Reading     []  Patient Will Call Back to Schedule Provider Visit             [x]       HbA1c & Other Labs []  Lab Appt Scheduled for Due Labs     []  Primary Care Follow Up Visit Scheduled      []  Reminded Patient to Complete Home Test     []  Added Reminder to Complete to Upcoming Primary Care Appt Notes     []  Patient Declined     []  Patient Will Call Back to Schedule     []  Patient Will Schedule with External Provider / Order Routed if Applicable           []    Schedule Primary Care Appt []  Primary Care Appt Scheduled     []  Patient Declined     []  Patient Will Call Back to Schedule     []  Pt Established with External Provider & Updated Care Team             []      Medication Adherence []  Primary Care Appointment Scheduled     []  Added Reminder to Upcoming Primary Care Appt Notes     []  Patient Reminded to  Prescription     []  Patient Declined, Provider Notified if Needed     []  Sent Provider Message to Review and/or Add Exclusion to Problem List             []      Osteoporosis Screening []  DXA Appointment Scheduled     []  External Records Requested     []  Added Reminder to Complete to Upcoming Primary Care Appt Notes     []  Patient Declined     []  Patient Will Call Back to Schedule     []  Patient Will Schedule with External Provider / Order Routed if Applicable     Additional Care Coordinator Notes:         Further Action Needed If Patient Returns Outreach:

## 2023-10-09 ENCOUNTER — PATIENT MESSAGE (OUTPATIENT)
Dept: FAMILY MEDICINE | Facility: CLINIC | Age: 53
End: 2023-10-09
Payer: COMMERCIAL

## 2023-10-09 DIAGNOSIS — M54.30 SCIATICA, UNSPECIFIED LATERALITY: Primary | ICD-10-CM

## 2023-10-10 RX ORDER — METHYLPREDNISOLONE 4 MG/1
TABLET ORAL
Qty: 21 EACH | Refills: 0 | Status: SHIPPED | OUTPATIENT
Start: 2023-10-10 | End: 2023-10-31

## 2023-10-10 NOTE — TELEPHONE ENCOUNTER
Sent in steroid pack      Skin marks are from again.  Make sure she isnt taking a lot of nsaids or Vit E supplements

## 2023-10-26 DIAGNOSIS — F41.9 ANXIETY: ICD-10-CM

## 2023-10-26 DIAGNOSIS — R45.4 IRRITABILITY: ICD-10-CM

## 2023-10-26 RX ORDER — BUPROPION HYDROCHLORIDE 150 MG/1
TABLET ORAL
Qty: 90 TABLET | Refills: 3 | Status: SHIPPED | OUTPATIENT
Start: 2023-10-26

## 2023-10-26 NOTE — TELEPHONE ENCOUNTER
Refill Routing Note   Medication(s) are not appropriate for processing by [facility] for the following reason(s):      New or recently adjusted medication    [facility] action(s):  Defer Care Due:  Labs due            Appointments  past 12m or future 3m with PCP    Date Provider   Last Visit   9/25/2023 Param Moses MD   Next Visit   Visit date not found Param Moses MD   ED visits in past 90 days: 0        Note composed:12:53 PM 10/26/2023

## 2023-10-26 NOTE — TELEPHONE ENCOUNTER
Care Due:                  Date            Visit Type   Department     Provider  --------------------------------------------------------------------------------                                SAME DAY -                              ESTABLISHED   [facility] FAM[facility]  Last Visit: 09-      PATIENT      MEDICINE       Param Moses  Next Visit: None Scheduled  None         None Found                                                            Last  Test          Frequency    Reason                     Performed    Due Date  --------------------------------------------------------------------------------    CMP.........  12 months..  colesevelam, metFORMIN,    11-   10-                             pravastatin..............    HBA1C.......  6 months...  metFORMIN, semaglutide,..  11- 05-    Lipid Panel.  12 months..  colesevelam, pravastatin.  11-   10-    [facility] Embedded Care Due Messages. Reference number: [id number].   10/26/2023 12:34:34 PM CDT

## 2023-12-27 ENCOUNTER — PATIENT MESSAGE (OUTPATIENT)
Dept: FAMILY MEDICINE | Facility: CLINIC | Age: 53
End: 2023-12-27
Payer: COMMERCIAL

## 2023-12-27 DIAGNOSIS — Z00.00 WELLNESS EXAMINATION: Primary | ICD-10-CM

## 2024-01-07 ENCOUNTER — PATIENT MESSAGE (OUTPATIENT)
Dept: FAMILY MEDICINE | Facility: CLINIC | Age: 54
End: 2024-01-07
Payer: COMMERCIAL

## 2024-01-11 ENCOUNTER — PATIENT MESSAGE (OUTPATIENT)
Dept: FAMILY MEDICINE | Facility: CLINIC | Age: 54
End: 2024-01-11
Payer: COMMERCIAL

## 2024-01-12 ENCOUNTER — TELEPHONE (OUTPATIENT)
Dept: FAMILY MEDICINE | Facility: CLINIC | Age: 54
End: 2024-01-12
Payer: COMMERCIAL

## 2024-01-12 NOTE — TELEPHONE ENCOUNTER
----- Message from Masha Acevedo sent at 1/11/2024  1:42 PM CST -----  Needs advice from nurse:      Who Called:pt  Regarding:pt needs to be seen for chest congestion  Would the patient [facility] back or [facility]?  Best Call Back number: [phone]  Additional Info:

## 2024-01-17 ENCOUNTER — PATIENT MESSAGE (OUTPATIENT)
Dept: FAMILY MEDICINE | Facility: CLINIC | Age: 54
End: 2024-01-17
Payer: COMMERCIAL

## 2024-01-17 DIAGNOSIS — G43.909 MIGRAINE WITHOUT STATUS MIGRAINOSUS, NOT INTRACTABLE, UNSPECIFIED MIGRAINE TYPE: Primary | ICD-10-CM

## 2024-01-17 RX ORDER — ONDANSETRON 4 MG/1
4 TABLET, ORALLY DISINTEGRATING ORAL EVERY 8 HOURS PRN
Qty: 30 TABLET | Refills: 3 | Status: SHIPPED | OUTPATIENT
Start: 2024-01-17

## 2024-01-17 RX ORDER — ERGOCALCIFEROL 1.25 MG/1
CAPSULE ORAL
Qty: 12 CAPSULE | Refills: 2 | Status: SHIPPED | OUTPATIENT
Start: 2024-01-17 | End: 2024-01-17 | Stop reason: SDUPTHER

## 2024-01-17 RX ORDER — ERGOCALCIFEROL 1.25 MG/1
50000 CAPSULE ORAL
Qty: 12 CAPSULE | Refills: 2 | Status: SHIPPED | OUTPATIENT
Start: 2024-01-17

## 2024-01-17 RX ORDER — PROMETHAZINE HYDROCHLORIDE 25 MG/1
25 TABLET ORAL EVERY 6 HOURS PRN
Qty: 15 TABLET | Refills: 0 | Status: SHIPPED | OUTPATIENT
Start: 2024-01-17 | End: 2024-01-18

## 2024-01-17 NOTE — TELEPHONE ENCOUNTER
No care due was identified.  [facility] Embedded Care Due Messages. Reference number: [id number].   1/17/2024 3:46:46 PM CST

## 2024-01-17 NOTE — TELEPHONE ENCOUNTER
Please approve for ergocalciferol (ERGOCALCIFEROL) 50,000 unit Cap     Last OV 9/25/23  Last refill date 5/8/23  Last labs 12/28/23  12x0r  Next appt NA

## 2024-01-17 NOTE — TELEPHONE ENCOUNTER
Refill Routing Note   Medication(s) are not appropriate for processing by [facility] for the following reason(s):        Outside of protocol    [facility] action(s):  Route               Appointments  past 12m or future 3m with PCP    Date Provider   Last Visit   9/25/2023 Param Moses MD   Next Visit   Visit date not found Param Moses MD   ED visits in past 90 days: 0        Note composed:2:36 PM 01/17/2024

## 2024-01-18 RX ORDER — PROMETHAZINE HYDROCHLORIDE 25 MG/1
25 TABLET ORAL EVERY 6 HOURS PRN
Qty: 30 TABLET | Refills: 0 | Status: SHIPPED | OUTPATIENT
Start: 2024-01-18

## 2024-01-24 ENCOUNTER — PATIENT MESSAGE (OUTPATIENT)
Dept: FAMILY MEDICINE | Facility: CLINIC | Age: 54
End: 2024-01-24
Payer: COMMERCIAL

## 2024-01-24 ENCOUNTER — PATIENT MESSAGE (OUTPATIENT)
Dept: OTOLARYNGOLOGY | Facility: CLINIC | Age: 54
End: 2024-01-24
Payer: COMMERCIAL

## 2024-02-05 ENCOUNTER — OFFICE VISIT (OUTPATIENT)
Dept: OTOLARYNGOLOGY | Facility: CLINIC | Age: 54
End: 2024-02-05
Payer: COMMERCIAL

## 2024-02-05 ENCOUNTER — HOSPITAL ENCOUNTER (OUTPATIENT)
Dept: RADIOLOGY | Facility: HOSPITAL | Age: 54
Discharge: HOME | End: 2024-02-05
Attending: NURSE PRACTITIONER
Payer: COMMERCIAL

## 2024-02-05 ENCOUNTER — PATIENT MESSAGE (OUTPATIENT)
Dept: FAMILY MEDICINE | Facility: CLINIC | Age: 54
End: 2024-02-05
Payer: COMMERCIAL

## 2024-02-05 VITALS — HEIGHT: 67 IN | BODY MASS INDEX: 33.67 KG/M2 | WEIGHT: 214.5 LBS

## 2024-02-05 DIAGNOSIS — H93.8X1 SENSATION OF FULLNESS IN RIGHT EAR: ICD-10-CM

## 2024-02-05 DIAGNOSIS — R22.1 LOCALIZED SWELLING, MASS AND LUMP, NECK: Primary | ICD-10-CM

## 2024-02-05 DIAGNOSIS — R22.1 LOCALIZED SWELLING, MASS AND LUMP, NECK: ICD-10-CM

## 2024-02-05 DIAGNOSIS — R04.0 RIGHT-SIDED EPISTAXIS: ICD-10-CM

## 2024-02-05 PROCEDURE — 1160F RVW MEDS BY RX/DR IN RCRD: CPT | Mod: CPTII,S$GLB,, | Performed by: NURSE PRACTITIONER

## 2024-02-05 PROCEDURE — 3008F BODY MASS INDEX DOCD: CPT | Mod: CPTII,S$GLB,, | Performed by: NURSE PRACTITIONER

## 2024-02-05 PROCEDURE — 99214 OFFICE O/P EST MOD 30 MIN: CPT | Mod: S$GLB,,, | Performed by: NURSE PRACTITIONER

## 2024-02-05 PROCEDURE — 76536 US EXAM OF HEAD AND NECK: CPT | Mod: 26,,, | Performed by: RADIOLOGY

## 2024-02-05 PROCEDURE — 76536 US EXAM OF HEAD AND NECK: CPT | Mod: TC,PO

## 2024-02-05 PROCEDURE — 99999 PR PBB SHADOW E&M-EST. PATIENT-LVL IV: CPT | Mod: PBBFAC,,, | Performed by: NURSE PRACTITIONER

## 2024-02-05 PROCEDURE — 1159F MED LIST DOCD IN RCRD: CPT | Mod: CPTII,S$GLB,, | Performed by: NURSE PRACTITIONER

## 2024-02-05 NOTE — PROGRESS NOTES
Subjective     Patient ID: Siobhan Richards is a 53 y.o. female.    Chief Complaint: Ear Fullness    HPI    Patient reports right ear fullness and right neck fullness/swelling X 2 weeks starting just under her right earlobe and radiating down right side of neck X 2 weeks. Patient denies muffled hearing AD. Patient denies otalgia or otorrhea AD. Patient had 4 episodes of right-sided epistaxis 3 weeks ago, but none in over 2 weeks.       Review of Systems   Constitutional: Negative.    HENT: Negative.     Eyes: Negative.    Respiratory: Negative.     Cardiovascular: Negative.    Gastrointestinal: Negative.    Musculoskeletal: Negative.    Integumentary:  Negative.   Neurological: Negative.    Hematological: Negative.    Psychiatric/Behavioral: Negative.            Objective     Physical Exam  Vitals and nursing note reviewed.   Constitutional:       General: She is not in acute distress.     Appearance: She is well-developed. She is not ill-appearing or diaphoretic.   HENT:      Head: Normocephalic and atraumatic.      Right Ear: Hearing, tympanic membrane, ear canal and external ear normal. No middle ear effusion. Tympanic membrane is not erythematous.      Left Ear: Hearing, tympanic membrane, ear canal and external ear normal.  No middle ear effusion. Tympanic membrane is not erythematous.      Nose: Nose normal. No mucosal edema, congestion or rhinorrhea.      Right Sinus: No maxillary sinus tenderness or frontal sinus tenderness.      Left Sinus: No maxillary sinus tenderness or frontal sinus tenderness.      Mouth/Throat:      Mouth: Mucous membranes are not pale, not dry and not cyanotic. No oral lesions.      Tongue: No lesions.      Palate: No lesions.      Pharynx: Uvula midline. No oropharyngeal exudate or posterior oropharyngeal erythema.   Eyes:      General: Lids are normal. No scleral icterus.        Right eye: No discharge.         Left eye: No discharge.   Neck:      Thyroid: No thyroid mass  or thyromegaly.      Trachea: Trachea normal. No tracheal deviation.   Cardiovascular:      Rate and Rhythm: Normal rate.   Pulmonary:      Effort: Pulmonary effort is normal. No respiratory distress.      Breath sounds: Normal air entry. No stridor. No wheezing.   Musculoskeletal:         General: Normal range of motion.      Cervical back: Normal range of motion and neck supple.   Lymphadenopathy:      Head:      Right side of head: No submental, submandibular, tonsillar, preauricular or posterior auricular adenopathy.      Left side of head: No submental, submandibular, tonsillar, preauricular or posterior auricular adenopathy.      Cervical: No cervical adenopathy.      Right cervical: No superficial or posterior cervical adenopathy.     Left cervical: No superficial or posterior cervical adenopathy.   Skin:     General: Skin is warm and dry.      Coloration: Skin is not pale.      Findings: No lesion or rash.   Neurological:      Mental Status: She is alert and oriented to person, place, and time.      Motor: Motor function is intact.      Coordination: Coordination is intact. Coordination normal.      Gait: Gait normal.   Psychiatric:         Attention and Perception: Attention normal.         Mood and Affect: Mood normal.         Speech: Speech normal.         Behavior: Behavior normal. Behavior is cooperative.          Assessment and Plan     1. Localized swelling, mass and lump, neck  Comments:  RIGHT  Orders:  -     US Soft Tissue Head Neck; Future; Expected date: 02/05/2024    2. Sensation of fullness in right ear    3. Right-sided epistaxis      Avoid nasal sprays such as Flonase if experiencing epistaxis. If no epistaxis in over 2 weeks, unlikely to find culprit as area has had sufficient time to heal. Encouraged to return if epistaxis resumes. Monitor blood pressure. Discussed dryness. Recommend Ponaris nasal emollient ad aryan.   Neck ultrasound of right side, particularly under right earlobe to right  anterior cervical track in front of right SCM muscle which is where patient's concern is located  Discussed ear measures for flying   Patient encouraged to return to clinic if symptoms worsen/persist and as needed for further ENT symptoms or concerns.       No follow-ups on file.

## 2024-02-06 ENCOUNTER — PATIENT MESSAGE (OUTPATIENT)
Dept: OTOLARYNGOLOGY | Facility: CLINIC | Age: 54
End: 2024-02-06
Payer: COMMERCIAL

## 2024-02-06 NOTE — TELEPHONE ENCOUNTER
Reviewed ultrasound.    Does have multinodular thyroid gland .    Would recommend recheck thyroid ultrasound in 1 year

## 2024-04-01 ENCOUNTER — PATIENT MESSAGE (OUTPATIENT)
Dept: FAMILY MEDICINE | Facility: CLINIC | Age: 54
End: 2024-04-01
Payer: COMMERCIAL

## 2024-04-03 ENCOUNTER — TELEPHONE (OUTPATIENT)
Dept: FAMILY MEDICINE | Facility: CLINIC | Age: 54
End: 2024-04-03
Payer: COMMERCIAL

## 2024-04-16 ENCOUNTER — OFFICE VISIT (OUTPATIENT)
Dept: FAMILY MEDICINE | Facility: CLINIC | Age: 54
End: 2024-04-16
Payer: COMMERCIAL

## 2024-04-16 VITALS
RESPIRATION RATE: 18 BRPM | DIASTOLIC BLOOD PRESSURE: 80 MMHG | SYSTOLIC BLOOD PRESSURE: 121 MMHG | HEART RATE: 85 BPM | WEIGHT: 212.75 LBS | HEIGHT: 67 IN | BODY MASS INDEX: 33.39 KG/M2

## 2024-04-16 DIAGNOSIS — R59.0 LYMPHADENOPATHY, ANTERIOR CERVICAL: ICD-10-CM

## 2024-04-16 DIAGNOSIS — Z00.00 WELLNESS EXAMINATION: Primary | ICD-10-CM

## 2024-04-16 DIAGNOSIS — D80.3 IGG3 SUBCLASS DEFICIENCY: ICD-10-CM

## 2024-04-16 DIAGNOSIS — E78.2 MIXED HYPERLIPIDEMIA: ICD-10-CM

## 2024-04-16 DIAGNOSIS — R22.1 NECK FULLNESS: ICD-10-CM

## 2024-04-16 DIAGNOSIS — R59.0 LOCALIZED ENLARGED LYMPH NODES: ICD-10-CM

## 2024-04-16 DIAGNOSIS — N32.81 OAB (OVERACTIVE BLADDER): ICD-10-CM

## 2024-04-16 DIAGNOSIS — E04.2 MULTIPLE THYROID NODULES: ICD-10-CM

## 2024-04-16 DIAGNOSIS — F98.8 ADD (ATTENTION DEFICIT DISORDER) WITHOUT HYPERACTIVITY: ICD-10-CM

## 2024-04-16 PROBLEM — E66.01 SEVERE OBESITY (BMI 35.0-39.9) WITH COMORBIDITY: Status: RESOLVED | Noted: 2022-10-18 | Resolved: 2024-04-16

## 2024-04-16 PROCEDURE — 99396 PREV VISIT EST AGE 40-64: CPT | Mod: S$GLB,,, | Performed by: INTERNAL MEDICINE

## 2024-04-16 PROCEDURE — 99999 PR PBB SHADOW E&M-EST. PATIENT-LVL IV: CPT | Mod: PBBFAC,,, | Performed by: INTERNAL MEDICINE

## 2024-04-16 PROCEDURE — 3074F SYST BP LT 130 MM HG: CPT | Mod: CPTII,S$GLB,, | Performed by: INTERNAL MEDICINE

## 2024-04-16 PROCEDURE — 3079F DIAST BP 80-89 MM HG: CPT | Mod: CPTII,S$GLB,, | Performed by: INTERNAL MEDICINE

## 2024-04-16 PROCEDURE — 3008F BODY MASS INDEX DOCD: CPT | Mod: CPTII,S$GLB,, | Performed by: INTERNAL MEDICINE

## 2024-04-16 PROCEDURE — 1159F MED LIST DOCD IN RCRD: CPT | Mod: CPTII,S$GLB,, | Performed by: INTERNAL MEDICINE

## 2024-04-16 PROCEDURE — 1160F RVW MEDS BY RX/DR IN RCRD: CPT | Mod: CPTII,S$GLB,, | Performed by: INTERNAL MEDICINE

## 2024-04-16 RX ORDER — OXYBUTYNIN CHLORIDE 5 MG/1
5 TABLET, EXTENDED RELEASE ORAL DAILY
Qty: 30 TABLET | Refills: 1 | Status: SHIPPED | OUTPATIENT
Start: 2024-04-16 | End: 2024-05-13

## 2024-04-16 RX ORDER — DEXTROAMPHETAMINE SACCHARATE, AMPHETAMINE ASPARTATE, DEXTROAMPHETAMINE SULFATE AND AMPHETAMINE SULFATE 5; 5; 5; 5 MG/1; MG/1; MG/1; MG/1
1 TABLET ORAL 2 TIMES DAILY
Qty: 60 TABLET | Refills: 0 | Status: SHIPPED | OUTPATIENT
Start: 2024-04-16

## 2024-04-16 NOTE — PROGRESS NOTES
Subjective:       Patient ID: Siobhan Richards is a 53 y.o. female.    Chief Complaint: Facial Swelling (Patient neck is swelling. Est two months. Her swelling is consistent ) and Annual Exam   HPI    Gyn:  Dr. Camilo / last menses year ago   MM2023  Dexa:  2020 normal  Colonoscopy:  19 cologaurd negative / due    Immunizations:  Tdap: check old note Pneumonia:  Shingles: no    Smoker:  Former  Get old note        Wellness   Due for labs     Seen ENT neck fullness ultrasound done noted to have thyroid nodules none me biopsy standards.  Will recheck ultrasound in 1 year.      Continues to have right lymph node swelling below jaw line.  Right neck  feels or looks larger to patient than left.  No pain.  Denies sore throat.  Does have chronic on and off sinus symptoms right ear fullness.    Ultrasound thyroid shows shotty normal nodule with fatty hilum.  Will get CT neck completed.       Did have COVID 2024 prolonged upper respiratory.          ADD:  FL:  controlled Rx prn Adderall 20 mg.   Mostly with work  working fulltime again uses it on shorter day & Vyvanse longer days.   Rx prn work days Vyvanse 20, Adderall 20 IR     Glucose intolerance:   cyclic elevated fasting glucose. G 95 //    A1C 5.1   Off Metformin QD. //              (Highest 5.1 and 107) /   GLP 1 not covered by insurance any longer    Anxiety: cyclic out of blue mild panic, Driving anxiety. Rx  prn Buspar.    Prev took wellbutrin doesn't want anything take causes weight gain.       IBS D: urgency/diarrhea -since GB out.   Tired Welchol ? If helped      Metabolic syndrome: BMI started 37, BMI 33 & 212, 32 & 207.   Prev Adipex and  Topamax 50 .   Improved headaches.       GERD: small HH controlled Rx: Protonix 40      Migraines: improved Rx Topamax 50      Leg swelling: controlled Rx prn lasix      Mixed HLD: controlled : Rx Pravastatin 40.       Vitamin-D deficiency: controlled  Rx Vit D2 weekly       Allergies:  + tree, grass.- Dr Dominguez recommended shots. Deferred rx.  Pnx titers all low.   __  ____________________________________________________________________________________________________  Assessment & Plan:  1. Wellness examination  - Urinalysis; Future  - CBC Without Differential; Future  - Comprehensive Metabolic Panel; Future  - TSH; Future  - T4, Free; Future  - Lipid Panel; Future    2. Lymphadenopathy, anterior cervical  - CT Soft Tissue Neck With Contrast; Future    3. Neck fullness  - CT Soft Tissue Neck With Contrast; Future    4. Localized enlarged lymph nodes  - CT Soft Tissue Neck With Contrast; Future    5. Multiple thyroid nodules  - TSH; Future  - T4, Free; Future    6. ADD (attention deficit disorder) without hyperactivity  - dextroamphetamine-amphetamine (ADDERALL) 20 mg tablet; Take 1 tablet by mouth 2 (two) times a day. # 1  Dispense: 60 tablet; Refill: 0    7. OAB (overactive bladder)  - oxybutynin (DITROPAN-XL) 5 MG TR24; Take 1 tablet (5 mg total) by mouth once daily. Bladder  Dispense: 30 tablet; Refill: 1    8. IgG3 subclass deficiency    9. Mixed hyperlipidemia  - Lipid Panel; Future     Wellness examination  -     Urinalysis; Future; Expected date: 04/16/2024  -     CBC Without Differential; Future; Expected date: 04/16/2024  -     Comprehensive Metabolic Panel; Future; Expected date: 04/16/2024  -     TSH; Future; Expected date: 04/16/2024  -     T4, Free; Future; Expected date: 04/16/2024  -     Lipid Panel; Future; Expected date: 04/16/2024    Lymphadenopathy, anterior cervical  -     CT Soft Tissue Neck With Contrast; Future; Expected date: 04/16/2024    Neck fullness  -     CT Soft Tissue Neck With Contrast; Future; Expected date: 04/16/2024    Localized enlarged lymph nodes  -     CT Soft Tissue Neck With Contrast; Future; Expected date: 04/16/2024    Multiple thyroid nodules  Comments:  r/c 2/2025  Ultrasound  Orders:  -     TSH; Future; Expected date: 04/16/2024  -      T4, Free; Future; Expected date: 04/16/2024    ADD (attention deficit disorder) without hyperactivity  -     dextroamphetamine-amphetamine (ADDERALL) 20 mg tablet; Take 1 tablet by mouth 2 (two) times a day. # 1  Dispense: 60 tablet; Refill: 0    OAB (overactive bladder)  -     oxybutynin (DITROPAN-XL) 5 MG TR24; Take 1 tablet (5 mg total) by mouth once daily. Bladder  Dispense: 30 tablet; Refill: 1    IgG3 subclass deficiency    Mixed hyperlipidemia  -     Lipid Panel; Future; Expected date: 04/16/2024        Continue to work on regular exercise, maintain healthy weight, balanced diet. Avoid unhealthy habits: smoking, excessive alcohol intake.     Disclaimer: This note was partly generated using dictation software which may occasionally result in transcription errors  ____________________________________________________________________________________________________  Review of Systems:  Review of Systems   Constitutional:  Negative for appetite change.   HENT:  Negative for nosebleeds.    Eyes:  Negative for pain.   Respiratory:  Negative for choking.    Cardiovascular:  Negative for chest pain.   Gastrointestinal:  Negative for blood in stool.   Genitourinary:  Negative for hematuria.   Musculoskeletal:  Negative for joint swelling.   Skin:  Negative for pallor.   Neurological:  Negative for facial asymmetry.   Hematological:  Positive for adenopathy. Does not bruise/bleed easily.   Psychiatric/Behavioral:  Negative for confusion.        Objective:     Wt Readings from Last 3 Encounters:   04/16/24 96.5 kg (212 lb 11.9 oz)   02/05/24 97.3 kg (214 lb 8.1 oz)   12/04/23 95.3 kg (210 lb)     BP Readings from Last 3 Encounters:   04/16/24 121/80   09/25/23 132/84   05/25/23 124/84       Lab Results   Component Value Date    WBC 8.45 12/28/2023    HGB 14.5 12/28/2023    HCT 43.8 12/28/2023     12/28/2023     12/28/2023    K 4.6 12/28/2023     12/28/2023    ALT 21 12/28/2023    AST 23 12/28/2023     CO2 27 12/28/2023    CREATININE 0.90 12/28/2023    BUN 16 12/28/2023    GLU 96 12/28/2023      Hemoglobin A1C   Date Value Ref Range Status   12/28/2023 5.0 0.0 - 5.6 % Final     Comment:     Reference Interval:  5.0 - 5.6 Normal   5.7 - 6.4 High Risk   > 6.5 Diabetic      Hgb A1c results are standardized based on the (NGSP) National   Glycohemoglobin Standardization Program.      Hemoglobin A1C levels are related to mean serum/plasma glucose   during the preceding 2-3 months.        11/05/2022 5.1 0.0 - 5.6 % Final     Comment:     Reference Interval:  5.0 - 5.6 Normal   5.7 - 6.4 High Risk   > 6.5 Diabetic      Hgb A1c results are standardized based on the (NGSP) National   Glycohemoglobin Standardization Program.      Hemoglobin A1C levels are related to mean serum/plasma glucose   during the preceding 2-3 months.        10/06/2021 5.4 0.0 - 5.6 % Final     Comment:     Reference Interval:  5.0 - 5.6 Normal   5.7 - 6.4 High Risk   > 6.5 Diabetic      Hgb A1c results are standardized based on the (NGSP) National   Glycohemoglobin Standardization Program.      Hemoglobin A1C levels are related to mean serum/plasma glucose   during the preceding 2-3 months.           Lab Results   Component Value Date    TSH 1.810 11/05/2022    TSH 2.700 07/21/2022    TSH 2.200 12/29/2021     Lab Results   Component Value Date    FREET4 1.12 07/21/2022    FREET4 1.28 12/29/2021    FREET4 1.09 11/07/2020     Lab Results   Component Value Date    LDLCALC 135.0 12/28/2023    LDLCALC 105.6 11/05/2022    LDLCALC 127.2 10/06/2021     Lab Results   Component Value Date    TRIG 165 (H) 12/28/2023    TRIG 127 11/05/2022    TRIG 144 10/06/2021            Physical Exam  Constitutional:       Appearance: Normal appearance.   HENT:      Head: Normocephalic and atraumatic.   Eyes:      Extraocular Movements: Extraocular movements intact.      Pupils: Pupils are equal, round, and reactive to light.   Neck:      Thyroid: No thyroid mass or thyroid  tenderness.        Comments:  Does seem to have slight increase fullness to right anterior neck inferior to sternocleidomastoid muscle.     Right anterior cervical superior lymphadenopathy proximally 1 cm shotty nodes inferior pea size  Cardiovascular:      Rate and Rhythm: Normal rate and regular rhythm.   Pulmonary:      Effort: Pulmonary effort is normal.      Breath sounds: Normal breath sounds.   Musculoskeletal:      Cervical back: Full passive range of motion without pain.      Right lower leg: No edema.      Left lower leg: No edema.   Lymphadenopathy:      Cervical: Cervical adenopathy present.      Right cervical: Superficial cervical adenopathy present. No deep or posterior cervical adenopathy.     Left cervical: No superficial, deep or posterior cervical adenopathy.   Neurological:      Mental Status: She is alert and oriented to person, place, and time.   Psychiatric:         Mood and Affect: Mood normal.         Medication List with Changes/Refills   New Medications    OXYBUTYNIN (DITROPAN-XL) 5 MG TR24    Take 1 tablet (5 mg total) by mouth once daily. Bladder   Current Medications    ALBUTEROL (VENTOLIN HFA) 90 MCG/ACTUATION INHALER    Inhale 2 puffs into the lungs every 6 (six) hours as needed for Wheezing. Rescue    ASPIRIN-ACETAMINOPHEN-CAFFEINE 250-250-65 MG (EXCEDRIN MIGRAINE) 250-250-65 MG PER TABLET    Excedrin Migraine   prn    COLESEVELAM (WELCHOL) 625 MG TABLET    Take 1 tablet (625 mg total) by mouth 2 (two) times daily with meals. Diarrhea    DICYCLOMINE (BENTYL) 10 MG CAPSULE    Take 1 capsule (10 mg total) by mouth 4 (four) times daily. Prn stomach cramps    ERGOCALCIFEROL (ERGOCALCIFEROL) 50,000 UNIT CAP    Take 1 capsule (50,000 Units total) by mouth every 7 days.    ESOMEPRAZOLE (NEXIUM) 40 MG CAPSULE    TAKE 1 CAPSULE BY MOUTH BEFORE BREAKFAST.    FLUTICASONE PROPIONATE (FLONASE) 50 MCG/ACTUATION NASAL SPRAY    1 spray (50 mcg total) by Each Nostril route 2 (two) times daily.     FUROSEMIDE (LASIX) 20 MG TABLET    TAKE 1 TABLET ONCE A DAY AS NEEDED SWELLING    LISDEXAMFETAMINE (VYVANSE) 20 MG CAPSULE    Take 1 capsule (20 mg total) by mouth every morning. #2    MULTIVITAMIN (DAILY VITAMIN ORAL)        ONDANSETRON (ZOFRAN-ODT) 4 MG TBDL    Take 1 tablet (4 mg total) by mouth every 8 (eight) hours as needed (nausea).    PRAVASTATIN (PRAVACHOL) 40 MG TABLET    TAKE 1 TABLET BY MOUTH EVERY DAY    PROMETHAZINE (PHENERGAN) 25 MG TABLET    Take 1 tablet (25 mg total) by mouth every 6 (six) hours as needed for Nausea.   Changed and/or Refilled Medications    Modified Medication Previous Medication    DEXTROAMPHETAMINE-AMPHETAMINE (ADDERALL) 20 MG TABLET dextroamphetamine-amphetamine (ADDERALL) 20 mg tablet       Take 1 tablet by mouth 2 (two) times a day. # 1    Take 1 tablet by mouth 2 (two) times a day. # 1   Discontinued Medications    BENZONATATE (TESSALON) 200 MG CAPSULE    Take 1 capsule (200 mg total) by mouth 2 (two) times daily as needed for Cough.    BUPROPION (WELLBUTRIN XL) 150 MG TB24 TABLET    Take 1 tablet (150 mg total) by mouth once daily.    COENZYME Q10 200 MG CAPSULE        METFORMIN (GLUCOPHAGE-XR) 500 MG ER 24HR TABLET    Take 1 tablet (500 mg total) by mouth daily with breakfast.    SEMAGLUTIDE, WEIGHT LOSS, (WEGOVY) 0.25 MG/0.5 ML PNIJ    Inject 0.25 mg into the skin every 7 days.    TOPIRAMATE (TOPAMAX) 100 MG TABLET    Take 1 tablet (100 mg total) by mouth once daily.    VITAMIN E ACETATE (VITAMIN E ORAL)    Take by mouth.

## 2024-04-17 PROBLEM — N32.81 OAB (OVERACTIVE BLADDER): Status: ACTIVE | Noted: 2024-04-17

## 2024-04-17 PROBLEM — E04.2 MULTIPLE THYROID NODULES: Status: ACTIVE | Noted: 2024-04-17

## 2024-05-01 ENCOUNTER — HOSPITAL ENCOUNTER (OUTPATIENT)
Dept: RADIOLOGY | Facility: HOSPITAL | Age: 54
Discharge: HOME | End: 2024-05-01
Attending: INTERNAL MEDICINE
Payer: COMMERCIAL

## 2024-05-01 DIAGNOSIS — R59.0 LYMPHADENOPATHY, ANTERIOR CERVICAL: ICD-10-CM

## 2024-05-01 DIAGNOSIS — R22.1 NECK FULLNESS: ICD-10-CM

## 2024-05-01 DIAGNOSIS — R59.0 LOCALIZED ENLARGED LYMPH NODES: ICD-10-CM

## 2024-05-01 PROCEDURE — 70491 CT SOFT TISSUE NECK W/DYE: CPT | Mod: 26,,, | Performed by: RADIOLOGY

## 2024-05-01 PROCEDURE — 70491 CT SOFT TISSUE NECK W/DYE: CPT | Mod: TC,PO

## 2024-05-01 PROCEDURE — 25500020 PHARM REV CODE 255: Mod: PO | Performed by: INTERNAL MEDICINE

## 2024-05-01 RX ADMIN — IOHEXOL 75 ML: 350 INJECTION, SOLUTION INTRAVENOUS at 09:05

## 2024-05-02 ENCOUNTER — PATIENT MESSAGE (OUTPATIENT)
Dept: FAMILY MEDICINE | Facility: CLINIC | Age: 54
End: 2024-05-02
Payer: COMMERCIAL

## 2024-05-05 ENCOUNTER — PATIENT MESSAGE (OUTPATIENT)
Dept: FAMILY MEDICINE | Facility: CLINIC | Age: 54
End: 2024-05-05
Payer: COMMERCIAL

## 2024-05-11 DIAGNOSIS — N32.81 OAB (OVERACTIVE BLADDER): ICD-10-CM

## 2024-05-11 NOTE — TELEPHONE ENCOUNTER
Refill Routing Note   Medication(s) are not appropriate for processing by [facility] for the following reason(s):        New or recently adjusted medication:     [facility] action(s):  Defer      Medication Therapy Plan:         Appointments  past 12m or future 3m with PCP    Date Provider   Last Visit   4/16/2024 Param Moses MD   Next Visit   7/24/2024 Param Moses MD   ED visits in past 90 days: 0        Note composed:4:23 PM 05/11/2024

## 2024-05-11 NOTE — TELEPHONE ENCOUNTER
No care due was identified.  [facility] Embedded Care Due Messages. Reference number: [id number].   5/11/2024 9:31:35 AM CDT

## 2024-05-13 RX ORDER — OXYBUTYNIN CHLORIDE 5 MG/1
5 TABLET, EXTENDED RELEASE ORAL DAILY
Qty: 90 TABLET | Refills: 2 | Status: SHIPPED | OUTPATIENT
Start: 2024-05-13

## 2024-06-28 LAB — CRC RECOMMENDATION EXT: NORMAL

## 2024-07-03 ENCOUNTER — PATIENT OUTREACH (OUTPATIENT)
Dept: ADMINISTRATIVE | Facility: HOSPITAL | Age: 54
End: 2024-07-03
Payer: COMMERCIAL

## 2024-07-03 NOTE — LETTER
FAX      AUTHORIZATION FOR RELEASE OF   CONFIDENTIAL INFORMATION        We are seeing Siobhan Richards, date of birth 1970, in the clinic at [facility]. Param Moses MD is the patient's PCP. Siobhan Richards has an outstanding lab/procedure at the time we reviewed their chart. In order to help keep their health information updated, [facility] us to request the following medical record(s):       (X)  RECALL DATE FOR COLONOSCOPY DONE 24                                     Please fax records to [facility] [phone] or [phone].     If you have any questions, please contact Abilio @ [phone].             Siobhan Richards  MRN: [id number]  : 1970  Age: 52 y.o.  Sex: female         Patient/Legal Guardian Signature  This signature was collected at 2023           _______________________________   Printed Name/Relationship to Patient      Consent for Examination and Treatment: I hereby authorize the providers and employees of [facility] ([facility]) to provide medical treatment/services which includes, but is not limited to, performing and administering tests and diagnostic procedures that are deemed necessary, including, but not limited to, imaging examinations, blood tests and other laboratory procedures as may be required by the hospital, clinic, or may be ordered by my physician(s) or persons working under the general and/or special instructions of my physician(s).      I understand and agree that this consent covers all authorized persons, including but not limited to physicians, residents, nurse practitioners, physicians' assistants, specialists, consultants, student nurses, and independently contracted physicians, who are called upon by the physician in charge, to carry out the diagnostic procedures and medical or surgical treatment.     I hereby authorize [facility] to retain or dispose of any specimens  or tissue, should there be such remaining from any test or procedure.     I hereby authorize and give consent for [facility] providers and employees to take photographs, images or videotapes of such diagnostic, surgical or treatment procedures of Patient as may be required by [facility] or as may be ordered by a physician. I further acknowledge and agree that [facility] may use cameras or other devices for patient monitoring.     I am aware that the practice of medicine is not an exact science, and I acknowledge that no guarantees have been made to me as to the outcome of any tests, procedures or treatment.     Authorization for Release of Information: I understand that my insurance company and/or their agents may need information necessary to make determinations about payment/reimbursement. I hereby provide authorization to release to all insurance companies, their successors, assignees, other parties with whom they may have contracted, or others acting on their behalf, that are involved with payment for any hospital and/or clinic charges incurred by the patient, any information that they request and deem necessary for payment/reimbursement, and/or quality review.  I further authorize the release of my health information to physicians or other health care practitioners on staff who are involved in my health care now and in the future, and to other health care providers, entities, or institutions for the purpose of my continued care and treatment, including referrals.

## 2024-07-03 NOTE — PROGRESS NOTES
Population Health Chart Review & Patient Outreach Details      Additional [facility] Health Notes:               Updates Requested / Reviewed:      Updated Care Coordination Note, , Care Team Updated, and Immunizations Reconciliation Completed or Queried: [place]         Health Maintenance Topics Overdue:      VBHM Score: 0     Patient is not due for any topics at this time.                       Health Maintenance Topic(s) Outreach Outcomes & Actions Taken:    Colorectal Cancer Screening - Outreach Outcomes & Actions Taken  : External Records Requested & Care Team Updated if Applicable and External Records Uploaded, Care Team Updated, & History Updated if Applicable

## 2024-07-06 DIAGNOSIS — F98.8 ADD (ATTENTION DEFICIT DISORDER) WITHOUT HYPERACTIVITY: ICD-10-CM

## 2024-07-06 NOTE — TELEPHONE ENCOUNTER
No care due was identified.  [facility] Embedded Care Due Messages. Reference number: [id number].   7/06/2024 6:19:50 PM CDT

## 2024-07-08 RX ORDER — DEXTROAMPHETAMINE SACCHARATE, AMPHETAMINE ASPARTATE, DEXTROAMPHETAMINE SULFATE AND AMPHETAMINE SULFATE 5; 5; 5; 5 MG/1; MG/1; MG/1; MG/1
1 TABLET ORAL 2 TIMES DAILY
Qty: 60 TABLET | Refills: 0 | Status: SHIPPED | OUTPATIENT
Start: 2024-07-08

## 2024-07-17 ENCOUNTER — PATIENT OUTREACH (OUTPATIENT)
Dept: ADMINISTRATIVE | Facility: HOSPITAL | Age: 54
End: 2024-07-17
Payer: COMMERCIAL

## 2024-07-17 NOTE — PROGRESS NOTES
Population Health Chart Review & Patient Outreach Details      Additional [facility] Health Notes:               Updates Requested / Reviewed:      Updated Care Coordination Note and Immunizations Reconciliation Completed or Queried: [place]         Health Maintenance Topics Overdue:      VBHM Score: 0     Patient is not due for any topics at this time.                       Health Maintenance Topic(s) Outreach Outcomes & Actions Taken:    Colorectal Cancer Screening - Outreach Outcomes & Actions Taken  : External Records Requested & Care Team Updated if Applicable

## 2024-07-17 NOTE — LETTER
FAX      AUTHORIZATION FOR RELEASE OF   CONFIDENTIAL INFORMATION        We are seeing Siobhan Richards, date of birth 1970, in the clinic at [facility]. Param Moses MD is the patient's PCP. Siobhan Richards has an outstanding lab/procedure at the time we reviewed their chart. In order to help keep their health information updated, Siobhan [facility] Richards has authorized us to request the following medical record(s):      (X)  COLON RECALL date for screening done 06/2024       Please fax records to [facility] [phone] or [phone].     If you have any questions, please contact Abilio @ [phone].

## 2024-07-22 ENCOUNTER — OFFICE VISIT (OUTPATIENT)
Dept: FAMILY MEDICINE | Facility: CLINIC | Age: 54
End: 2024-07-22
Payer: COMMERCIAL

## 2024-07-22 VITALS
HEART RATE: 83 BPM | OXYGEN SATURATION: 98 % | SYSTOLIC BLOOD PRESSURE: 120 MMHG | DIASTOLIC BLOOD PRESSURE: 80 MMHG | WEIGHT: 215.75 LBS | HEIGHT: 67 IN | BODY MASS INDEX: 33.86 KG/M2

## 2024-07-22 DIAGNOSIS — G43.709 CHRONIC MIGRAINE WITHOUT AURA WITHOUT STATUS MIGRAINOSUS, NOT INTRACTABLE: ICD-10-CM

## 2024-07-22 DIAGNOSIS — F98.8 ADD (ATTENTION DEFICIT DISORDER) WITHOUT HYPERACTIVITY: ICD-10-CM

## 2024-07-22 DIAGNOSIS — E78.2 MIXED HYPERLIPIDEMIA: Primary | ICD-10-CM

## 2024-07-22 DIAGNOSIS — Z00.00 WELLNESS EXAMINATION: ICD-10-CM

## 2024-07-22 DIAGNOSIS — E66.9 OBESITY (BMI 30.0-34.9): ICD-10-CM

## 2024-07-22 DIAGNOSIS — K21.00 GASTROESOPHAGEAL REFLUX DISEASE WITH ESOPHAGITIS WITHOUT HEMORRHAGE: ICD-10-CM

## 2024-07-22 PROCEDURE — 1160F RVW MEDS BY RX/DR IN RCRD: CPT | Mod: CPTII,S$GLB,, | Performed by: INTERNAL MEDICINE

## 2024-07-22 PROCEDURE — 99999 PR PBB SHADOW E&M-EST. PATIENT-LVL IV: CPT | Mod: PBBFAC,,, | Performed by: INTERNAL MEDICINE

## 2024-07-22 PROCEDURE — 99214 OFFICE O/P EST MOD 30 MIN: CPT | Mod: S$GLB,,, | Performed by: INTERNAL MEDICINE

## 2024-07-22 PROCEDURE — 1159F MED LIST DOCD IN RCRD: CPT | Mod: CPTII,S$GLB,, | Performed by: INTERNAL MEDICINE

## 2024-07-22 PROCEDURE — 3074F SYST BP LT 130 MM HG: CPT | Mod: CPTII,S$GLB,, | Performed by: INTERNAL MEDICINE

## 2024-07-22 PROCEDURE — 3079F DIAST BP 80-89 MM HG: CPT | Mod: CPTII,S$GLB,, | Performed by: INTERNAL MEDICINE

## 2024-07-22 PROCEDURE — 3008F BODY MASS INDEX DOCD: CPT | Mod: CPTII,S$GLB,, | Performed by: INTERNAL MEDICINE

## 2024-07-22 RX ORDER — DEXTROAMPHETAMINE SACCHARATE, AMPHETAMINE ASPARTATE, DEXTROAMPHETAMINE SULFATE AND AMPHETAMINE SULFATE 5; 5; 5; 5 MG/1; MG/1; MG/1; MG/1
1 TABLET ORAL 2 TIMES DAILY
Qty: 60 TABLET | Refills: 0 | Status: SHIPPED | OUTPATIENT
Start: 2024-08-08

## 2024-07-22 RX ORDER — SEMAGLUTIDE 0.25 MG/.5ML
0.25 INJECTION, SOLUTION SUBCUTANEOUS
Qty: 4 ML | Refills: 0 | Status: SHIPPED | OUTPATIENT
Start: 2024-07-22

## 2024-07-22 RX ORDER — TOPIRAMATE 50 MG/1
50 TABLET, FILM COATED ORAL DAILY
Qty: 90 TABLET | Refills: 1 | Status: SHIPPED | OUTPATIENT
Start: 2024-07-22 | End: 2025-07-22

## 2024-07-22 NOTE — PROGRESS NOTES
Subjective:       Patient ID: Siobhan Richards is a 53 y.o. female.    Chief Complaint: Follow-up   HPI    Gyn:  Dr. Camilo /  last menses   MM2023  Dexa:  2020 normal  Colonoscopy:  Dr Robledo  2024 Adenoma r.c 5    Egd + GERD with esophagitis  Immunizations:  Tdap: check old note Pneumonia:  Shingles: no    Smoker:  Former  Get old note    F/u        US thyroid:  Noted for small nodule cysts.  Recheck 1 year       Migraines are worsening.  Also has chronic tension headaches, sinus headaches.  Were improved on Topamax would like to start low-dose.             ADD:  FL:   controlled Rx prn Adderall 20 mg work days.  Mostly at work  Vyvanse longer days.   Rx prn work days Vyvanse 20, Adderall 20 IR     Glucose intolerance:   cyclic Improved fasting.  G 95 //    A1C 5.1   Off Metformin QD.               (Highest 5.1 and 107) /   GLP 1 not covered by insurance any longer       Anxiety: cyclic   Hx of panic attacks, driving anxiety. Rx  prn Buspar.    Prev took wellbutrin doesn't want anything take causes weight gain.       IBS D/C: urgency/diarrhea: long term issue worse since GB sx.     Samples of Xifaxin given recently.  Plans on starting soon  Tired Welchol ? If helped    : Celiac biopsy negative      Metabolic syndrome: BMI started 37, BMI 33 & 212, 32 & 207.   Prev Adipex and  Topamax 50 .   Improved headaches.       GERD: small HH controlled Rx: Protonix 40      Migraines with and without aura:  Worsening off Rx Topamax 50    Imtrex & Malalt      Leg swelling: controlled cyclic Rx prn Lasix      Mixed HLD: controlled  Rx Pravastatin 40.       Vitamin-D deficiency: controlled  Rx Vit D2 weekly      Allergies:  + tree, grass.- Dr Dominguez recommended shots. Deferred rx.  Pnx titers all low.     ____________________________________________________________________________________________________  Assessment & Plan:  1. Mixed hyperlipidemia  - Comprehensive Metabolic  Panel; Future  - Lipid Panel; Future  - semaglutide, weight loss, (WEGOVY) 0.25 mg/0.5 mL PnIj; Inject 0.25 mg into the skin every 7 days.  Dispense: 4 mL; Refill: 0    2. Gastroesophageal reflux disease with esophagitis without hemorrhage  - semaglutide, weight loss, (WEGOVY) 0.25 mg/0.5 mL PnIj; Inject 0.25 mg into the skin every 7 days.  Dispense: 4 mL; Refill: 0    3. Chronic migraine without aura without status migrainosus, not intractable  - topiramate (TOPAMAX) 50 MG tablet; Take 1 tablet (50 mg total) by mouth once daily.  Dispense: 90 tablet; Refill: 1    4. ADD (attention deficit disorder) without hyperactivity  - dextroamphetamine-amphetamine (ADDERALL) 20 mg tablet; Take 1 tablet by mouth 2 (two) times a day. # 2  Dispense: 60 tablet; Refill: 0    5. Obesity (BMI 30.0-34.9)  - semaglutide, weight loss, (WEGOVY) 0.25 mg/0.5 mL PnIj; Inject 0.25 mg into the skin every 7 days.  Dispense: 4 mL; Refill: 0    6. Wellness examination  - Comprehensive Metabolic Panel; Future  - Lipid Panel; Future     Mixed hyperlipidemia  -     Comprehensive Metabolic Panel; Future; Expected date: 07/22/2024  -     Lipid Panel; Future; Expected date: 07/22/2024  -     semaglutide, weight loss, (WEGOVY) 0.25 mg/0.5 mL PnIj; Inject 0.25 mg into the skin every 7 days.  Dispense: 4 mL; Refill: 0    Gastroesophageal reflux disease with esophagitis without hemorrhage  -     semaglutide, weight loss, (WEGOVY) 0.25 mg/0.5 mL PnIj; Inject 0.25 mg into the skin every 7 days.  Dispense: 4 mL; Refill: 0    Chronic migraine without aura without status migrainosus, not intractable  -     topiramate (TOPAMAX) 50 MG tablet; Take 1 tablet (50 mg total) by mouth once daily.  Dispense: 90 tablet; Refill: 1    ADD (attention deficit disorder) without hyperactivity  -     dextroamphetamine-amphetamine (ADDERALL) 20 mg tablet; Take 1 tablet by mouth 2 (two) times a day. # 2  Dispense: 60 tablet; Refill: 0    Obesity (BMI 30.0-34.9)  -      semaglutide, weight loss, (WEGOVY) 0.25 mg/0.5 mL PnIj; Inject 0.25 mg into the skin every 7 days.  Dispense: 4 mL; Refill: 0    Wellness examination  -     Comprehensive Metabolic Panel; Future; Expected date: 07/22/2024  -     Lipid Panel; Future; Expected date: 07/22/2024        Continue to work on regular exercise, maintain healthy weight, balanced diet. Avoid unhealthy habits: smoking, excessive alcohol intake.     Disclaimer: This note was partly generated using dictation software which may occasionally result in transcription errors  ____________________________________________________________________________________________________  Review of Systems:  Review of Systems      Negative     Objective:     Wt Readings from Last 3 Encounters:   07/22/24 97.8 kg (215 lb 11.5 oz)   04/16/24 96.5 kg (212 lb 11.9 oz)   02/05/24 97.3 kg (214 lb 8.1 oz)     BP Readings from Last 3 Encounters:   07/22/24 120/80   04/16/24 121/80   09/25/23 132/84       Lab Results   Component Value Date    WBC 8.66 04/20/2024    HGB 14.6 04/20/2024    HCT 43.0 04/20/2024     04/20/2024     04/20/2024    K 4.5 04/20/2024     04/20/2024    ALT 19 04/20/2024    AST 23 04/20/2024    CO2 25 04/20/2024    CREATININE 0.78 04/20/2024    BUN 22 (H) 04/20/2024    GLU 91 04/20/2024      Hemoglobin A1C   Date Value Ref Range Status   12/28/2023 5.0 0.0 - 5.6 % Final     Comment:     Reference Interval:  5.0 - 5.6 Normal   5.7 - 6.4 High Risk   > 6.5 Diabetic      Hgb A1c results are standardized based on the (NGSP) National   Glycohemoglobin Standardization Program.      Hemoglobin A1C levels are related to mean serum/plasma glucose   during the preceding 2-3 months.        11/05/2022 5.1 0.0 - 5.6 % Final     Comment:     Reference Interval:  5.0 - 5.6 Normal   5.7 - 6.4 High Risk   > 6.5 Diabetic      Hgb A1c results are standardized based on the (NGSP) National   Glycohemoglobin Standardization Program.      Hemoglobin A1C  levels are related to mean serum/plasma glucose   during the preceding 2-3 months.        10/06/2021 5.4 0.0 - 5.6 % Final     Comment:     Reference Interval:  5.0 - 5.6 Normal   5.7 - 6.4 High Risk   > 6.5 Diabetic      Hgb A1c results are standardized based on the (NGSP) National   Glycohemoglobin Standardization Program.      Hemoglobin A1C levels are related to mean serum/plasma glucose   during the preceding 2-3 months.           Lab Results   Component Value Date    TSH 1.800 04/20/2024    TSH 1.810 11/05/2022    TSH 2.700 07/21/2022     Lab Results   Component Value Date    FREET4 0.90 04/20/2024    FREET4 1.12 07/21/2022    FREET4 1.28 12/29/2021     Lab Results   Component Value Date    LDLCALC 116.0 04/20/2024    LDLCALC 135.0 12/28/2023    LDLCALC 105.6 11/05/2022     Lab Results   Component Value Date    TRIG 115 04/20/2024    TRIG 165 (H) 12/28/2023    TRIG 127 11/05/2022            Physical Exam    Physical Exam  Constitutional:       Appearance: Normal appearance.   HENT:      Head: Normocephalic and atraumatic.   Eyes:      Extraocular Movements: Extraocular movements intact.      Pupils: Pupils are equal, round, and reactive to light.   Cardiovascular:      Rate and Rhythm: Normal rate.   Pulmonary:      Effort: Pulmonary effort is normal.   Neurological:      Mental Status: She is alert and oriented to person, place, and time.   Psychiatric:         Mood and Affect: Mood normal.     Medication List with Changes/Refills   New Medications    SEMAGLUTIDE, WEIGHT LOSS, (WEGOVY) 0.25 MG/0.5 ML PNIJ    Inject 0.25 mg into the skin every 7 days.    TOPIRAMATE (TOPAMAX) 50 MG TABLET    Take 1 tablet (50 mg total) by mouth once daily.   Current Medications    ALBUTEROL (VENTOLIN HFA) 90 MCG/ACTUATION INHALER    Inhale 2 puffs into the lungs every 6 (six) hours as needed for Wheezing. Rescue    ASPIRIN-ACETAMINOPHEN-CAFFEINE 250-250-65 MG (EXCEDRIN MIGRAINE) 250-250-65 MG PER TABLET    Excedrin Migraine    prn    COLESEVELAM (WELCHOL) 625 MG TABLET    Take 1 tablet (625 mg total) by mouth 2 (two) times daily with meals. Diarrhea    DICYCLOMINE (BENTYL) 10 MG CAPSULE    Take 1 capsule (10 mg total) by mouth 4 (four) times daily. Prn stomach cramps    ERGOCALCIFEROL (ERGOCALCIFEROL) 50,000 UNIT CAP    Take 1 capsule (50,000 Units total) by mouth every 7 days.    ESOMEPRAZOLE (NEXIUM) 40 MG CAPSULE    TAKE 1 CAPSULE BY MOUTH BEFORE BREAKFAST.    FUROSEMIDE (LASIX) 20 MG TABLET    TAKE 1 TABLET ONCE A DAY AS NEEDED SWELLING    LISDEXAMFETAMINE (VYVANSE) 20 MG CAPSULE    Take 1 capsule (20 mg total) by mouth every morning. #2    MULTIVITAMIN (DAILY VITAMIN ORAL)        ONDANSETRON (ZOFRAN-ODT) 4 MG TBDL    Take 1 tablet (4 mg total) by mouth every 8 (eight) hours as needed (nausea).    OXYBUTYNIN (DITROPAN-XL) 5 MG TR24    Take 1 tablet (5 mg total) by mouth once daily. Bladder    PRAVASTATIN (PRAVACHOL) 40 MG TABLET    TAKE 1 TABLET BY MOUTH EVERY DAY    PROMETHAZINE (PHENERGAN) 25 MG TABLET    Take 1 tablet (25 mg total) by mouth every 6 (six) hours as needed for Nausea.   Changed and/or Refilled Medications    Modified Medication Previous Medication    DEXTROAMPHETAMINE-AMPHETAMINE (ADDERALL) 20 MG TABLET dextroamphetamine-amphetamine (ADDERALL) 20 mg tablet       Take 1 tablet by mouth 2 (two) times a day. # 2    Take 1 tablet by mouth 2 (two) times a day. # 2

## 2024-07-22 NOTE — PROGRESS NOTES
Subjective:       Patient ID: Siobhan Richards is a 53 y.o. female.    Chief Complaint: No chief complaint on file.   HPI      ____________________________________________________________________________________________________  Assessment & Plan:  There are no diagnoses linked to this encounter.   There are no diagnoses linked to this encounter.    Continue to work on regular exercise, maintain healthy weight, balanced diet. Avoid unhealthy habits: smoking, excessive alcohol intake.     Disclaimer: This note was partly generated using dictation software which may occasionally result in transcription errors  ____________________________________________________________________________________________________  Review of Systems:  Review of Systems   Constitutional:  Negative for appetite change.   HENT:  Negative for nosebleeds.    Eyes:  Negative for pain.   Respiratory:  Negative for choking.    Cardiovascular:  Negative for chest pain.   Gastrointestinal:  Negative for blood in stool.   Genitourinary:  Negative for hematuria.   Musculoskeletal:  Negative for joint swelling.   Skin:  Negative for pallor.   Neurological:  Negative for facial asymmetry.   Hematological:  Does not bruise/bleed easily.   Psychiatric/Behavioral:  Negative for confusion.      Objective:     Wt Readings from Last 3 Encounters:   04/16/24 96.5 kg (212 lb 11.9 oz)   02/05/24 97.3 kg (214 lb 8.1 oz)   12/04/23 95.3 kg (210 lb)     BP Readings from Last 3 Encounters:   04/16/24 121/80   09/25/23 132/84   05/25/23 124/84       Lab Results   Component Value Date    WBC 8.66 04/20/2024    HGB 14.6 04/20/2024    HCT 43.0 04/20/2024     04/20/2024     04/20/2024    K 4.5 04/20/2024     04/20/2024    ALT 19 04/20/2024    AST 23 04/20/2024    CO2 25 04/20/2024    CREATININE 0.78 04/20/2024    BUN 22 (H) 04/20/2024    GLU 91 04/20/2024      Hemoglobin A1C   Date Value Ref Range Status   12/28/2023 5.0 0.0 - 5.6 % Final      Comment:     Reference Interval:  5.0 - 5.6 Normal   5.7 - 6.4 High Risk   > 6.5 Diabetic      Hgb A1c results are standardized based on the (NGSP) National   Glycohemoglobin Standardization Program.      Hemoglobin A1C levels are related to mean serum/plasma glucose   during the preceding 2-3 months.        11/05/2022 5.1 0.0 - 5.6 % Final     Comment:     Reference Interval:  5.0 - 5.6 Normal   5.7 - 6.4 High Risk   > 6.5 Diabetic      Hgb A1c results are standardized based on the (NGSP) National   Glycohemoglobin Standardization Program.      Hemoglobin A1C levels are related to mean serum/plasma glucose   during the preceding 2-3 months.        10/06/2021 5.4 0.0 - 5.6 % Final     Comment:     Reference Interval:  5.0 - 5.6 Normal   5.7 - 6.4 High Risk   > 6.5 Diabetic      Hgb A1c results are standardized based on the (NGSP) National   Glycohemoglobin Standardization Program.      Hemoglobin A1C levels are related to mean serum/plasma glucose   during the preceding 2-3 months.           Lab Results   Component Value Date    TSH 1.800 04/20/2024    TSH 1.810 11/05/2022    TSH 2.700 07/21/2022     Lab Results   Component Value Date    FREET4 0.90 04/20/2024    FREET4 1.12 07/21/2022    FREET4 1.28 12/29/2021     Lab Results   Component Value Date    LDLCALC 116.0 04/20/2024    LDLCALC 135.0 12/28/2023    LDLCALC 105.6 11/05/2022     Lab Results   Component Value Date    TRIG 115 04/20/2024    TRIG 165 (H) 12/28/2023    TRIG 127 11/05/2022            Physical Exam  Constitutional:       Appearance: Normal appearance.   HENT:      Head: Normocephalic and atraumatic.   Eyes:      Extraocular Movements: Extraocular movements intact.      Pupils: Pupils are equal, round, and reactive to light.   Cardiovascular:      Rate and Rhythm: Normal rate.   Pulmonary:      Effort: Pulmonary effort is normal.   Neurological:      Mental Status: She is alert and oriented to person, place, and time.   Psychiatric:         Mood  and Affect: Mood normal.       Medication List with Changes/Refills   Current Medications    ALBUTEROL (VENTOLIN HFA) 90 MCG/ACTUATION INHALER    Inhale 2 puffs into the lungs every 6 (six) hours as needed for Wheezing. Rescue    ASPIRIN-ACETAMINOPHEN-CAFFEINE 250-250-65 MG (EXCEDRIN MIGRAINE) 250-250-65 MG PER TABLET    Excedrin Migraine   prn    COLESEVELAM (WELCHOL) 625 MG TABLET    Take 1 tablet (625 mg total) by mouth 2 (two) times daily with meals. Diarrhea    DEXTROAMPHETAMINE-AMPHETAMINE (ADDERALL) 20 MG TABLET    Take 1 tablet by mouth 2 (two) times a day. # 2    DICYCLOMINE (BENTYL) 10 MG CAPSULE    Take 1 capsule (10 mg total) by mouth 4 (four) times daily. Prn stomach cramps    ERGOCALCIFEROL (ERGOCALCIFEROL) 50,000 UNIT CAP    Take 1 capsule (50,000 Units total) by mouth every 7 days.    ESOMEPRAZOLE (NEXIUM) 40 MG CAPSULE    TAKE 1 CAPSULE BY MOUTH BEFORE BREAKFAST.    FUROSEMIDE (LASIX) 20 MG TABLET    TAKE 1 TABLET ONCE A DAY AS NEEDED SWELLING    LISDEXAMFETAMINE (VYVANSE) 20 MG CAPSULE    Take 1 capsule (20 mg total) by mouth every morning. #2    MULTIVITAMIN (DAILY VITAMIN ORAL)        ONDANSETRON (ZOFRAN-ODT) 4 MG TBDL    Take 1 tablet (4 mg total) by mouth every 8 (eight) hours as needed (nausea).    OXYBUTYNIN (DITROPAN-XL) 5 MG TR24    Take 1 tablet (5 mg total) by mouth once daily. Bladder    PRAVASTATIN (PRAVACHOL) 40 MG TABLET    TAKE 1 TABLET BY MOUTH EVERY DAY    PROMETHAZINE (PHENERGAN) 25 MG TABLET    Take 1 tablet (25 mg total) by mouth every 6 (six) hours as needed for Nausea.

## 2024-08-02 ENCOUNTER — PATIENT OUTREACH (OUTPATIENT)
Dept: ADMINISTRATIVE | Facility: HOSPITAL | Age: 54
End: 2024-08-02
Payer: COMMERCIAL

## 2024-08-02 NOTE — PROGRESS NOTES
Population Health Chart Review & Patient Outreach Details      Additional [facility] Notes:               Updates Requested / Reviewed:      Updated Care Coordination Note, Care Everywhere, and Immunizations Reconciliation Completed or Queried: [place]         Health Maintenance Topics Overdue:      [facility] Score: 0     Patient is not due for any topics at this time.                       Health Maintenance Topic(s) Outreach Outcomes & Actions Taken:    Colorectal Cancer Screening - Outreach Outcomes & Actions Taken  : External Records Uploaded, Care Team Updated, & History Updated if Applicable-Pathology and Recall Date loaded under media

## 2024-09-09 ENCOUNTER — CLINICAL SUPPORT (OUTPATIENT)
Dept: AUDIOLOGY | Facility: CLINIC | Age: 54
End: 2024-09-09
Payer: COMMERCIAL

## 2024-09-09 ENCOUNTER — TELEPHONE (OUTPATIENT)
Dept: NEUROLOGY | Facility: CLINIC | Age: 54
End: 2024-09-09
Payer: COMMERCIAL

## 2024-09-09 ENCOUNTER — OFFICE VISIT (OUTPATIENT)
Dept: OTOLARYNGOLOGY | Facility: CLINIC | Age: 54
End: 2024-09-09
Payer: COMMERCIAL

## 2024-09-09 VITALS — BODY MASS INDEX: 34.29 KG/M2 | HEIGHT: 67 IN | WEIGHT: 218.5 LBS

## 2024-09-09 DIAGNOSIS — H92.01 REFERRED OTALGIA OF RIGHT EAR: ICD-10-CM

## 2024-09-09 DIAGNOSIS — H92.01 EAR PAIN, RIGHT: Primary | ICD-10-CM

## 2024-09-09 DIAGNOSIS — R51.9 RIGHT-SIDED HEADACHE: Primary | ICD-10-CM

## 2024-09-09 DIAGNOSIS — H93.13 TINNITUS, BILATERAL: ICD-10-CM

## 2024-09-09 PROCEDURE — 99214 OFFICE O/P EST MOD 30 MIN: CPT | Mod: S$GLB,,, | Performed by: NURSE PRACTITIONER

## 2024-09-09 PROCEDURE — 1159F MED LIST DOCD IN RCRD: CPT | Mod: CPTII,S$GLB,, | Performed by: NURSE PRACTITIONER

## 2024-09-09 PROCEDURE — 99999 PR PBB SHADOW E&M-EST. PATIENT-LVL III: CPT | Mod: PBBFAC,,, | Performed by: NURSE PRACTITIONER

## 2024-09-09 PROCEDURE — 3008F BODY MASS INDEX DOCD: CPT | Mod: CPTII,S$GLB,, | Performed by: NURSE PRACTITIONER

## 2024-09-09 PROCEDURE — 92567 TYMPANOMETRY: CPT | Mod: S$GLB,,, | Performed by: AUDIOLOGIST-HEARING AID FITTER

## 2024-09-09 RX ORDER — PANTOPRAZOLE SODIUM 40 MG/1
40 TABLET, DELAYED RELEASE ORAL DAILY
COMMUNITY

## 2024-09-09 NOTE — PROGRESS NOTES
Siobhan Richards was seen 09/09/2024 for tympanometry per order from Macey Drake NP, ENT,  due to complaint of right ear pain. Pt was alone during today's visit. Results indicate Type A for the right ear indicating normal middle ear function and Type A for the left ear indicating normal middle ear function.     Results will be reviewed by ENT following this encounter. All complaints were addressed during this visit to the patient's satisfaction. Plan of care was discussed in detail with the patient, who agreed with the plan as above.

## 2024-09-09 NOTE — TELEPHONE ENCOUNTER
----- Message from Wale Naidu MA sent at 9/9/2024  4:24 PM CDT -----  Pls call and schedule patient

## 2024-09-09 NOTE — PROGRESS NOTES
Subjective     Patient ID: Siobhan Richards is a 53 y.o. female.    Chief Complaint: Ear Problem (Right ear pain )    HPI  Patient last seen by me 6 months ago for right neck fullness/swelling. U/S was normal, CT showed no lymphadenopathy, no enlarged or necrotic lymph node, no dominant soft tissue mass. The right submandibular gland is slightly more prominent when compared to the left.  There is no obvious acute inflammation or edema.  There is no soft tissue abscess.  Patient was also seen 6 months ago for right-sided epistaxis. Patient was encouraged to avoid nasal sprays such as Flonase and Astelin, monitor blood pressure, and use Ponaris nasal emollient ad aryan.   Patient returns today for right ear pain. Right ear feels hot and full for less than one week. Denies throat discomfort. Patient reports some dizziness described as off-balance upon standing. Some slight nausea, but states nausea comes with headaches, so hard to tell.     Review of Systems   Constitutional: Negative.    HENT:  Positive for ear pain and tinnitus.    Eyes: Negative.    Respiratory: Negative.     Cardiovascular: Negative.    Gastrointestinal: Negative.    Musculoskeletal: Negative.    Integumentary:  Negative.   Neurological:  Positive for headaches.   Hematological: Negative.    Psychiatric/Behavioral: Negative.            Objective     Physical Exam  Vitals and nursing note reviewed.   Constitutional:       General: She is not in acute distress.     Appearance: She is well-developed. She is not ill-appearing or diaphoretic.   HENT:      Head: Normocephalic and atraumatic.      Right Ear: Hearing, tympanic membrane, ear canal and external ear normal. No middle ear effusion. Tympanic membrane is not erythematous. Tympanic membrane has normal mobility.      Left Ear: Hearing, tympanic membrane, ear canal and external ear normal.  No middle ear effusion. Tympanic membrane is not erythematous. Tympanic membrane has normal mobility.  "     Ears:      Comments: Type "A" tympanograms AU consistent with well-pneumatized mesotympanums and absence of middle ear effusions     Nose: Nose normal. No mucosal edema, congestion or rhinorrhea.      Right Sinus: No maxillary sinus tenderness or frontal sinus tenderness.      Left Sinus: No maxillary sinus tenderness or frontal sinus tenderness.      Mouth/Throat:      Mouth: Mucous membranes are not pale, not dry and not cyanotic. No oral lesions.      Tongue: No lesions.      Palate: No lesions.      Pharynx: Uvula midline. No oropharyngeal exudate or posterior oropharyngeal erythema.   Eyes:      General: Lids are normal. No scleral icterus.        Right eye: No discharge.         Left eye: No discharge.   Neck:      Thyroid: No thyroid mass or thyromegaly.      Trachea: Trachea normal. No tracheal deviation.   Cardiovascular:      Rate and Rhythm: Normal rate.   Pulmonary:      Effort: Pulmonary effort is normal. No respiratory distress.      Breath sounds: Normal air entry. No stridor. No wheezing.   Musculoskeletal:         General: Normal range of motion.      Cervical back: Normal range of motion and neck supple.   Lymphadenopathy:      Head:      Right side of head: No submental, submandibular, tonsillar, preauricular or posterior auricular adenopathy.      Left side of head: No submental, submandibular, tonsillar, preauricular or posterior auricular adenopathy.      Cervical: No cervical adenopathy.      Right cervical: No superficial or posterior cervical adenopathy.     Left cervical: No superficial or posterior cervical adenopathy.   Skin:     General: Skin is warm and dry.      Coloration: Skin is not pale.      Findings: No lesion or rash.   Neurological:      Mental Status: She is alert and oriented to person, place, and time.      Motor: Motor function is intact.      Coordination: Coordination is intact. Coordination normal.      Gait: Gait normal.   Psychiatric:         Attention and " Perception: Attention normal.         Mood and Affect: Mood normal.         Speech: Speech normal.         Behavior: Behavior normal. Behavior is cooperative.     Negative [place]-Hallpike AU     Assessment and Plan     1. Right-sided headache  -     Ambulatory referral/consult to Neurology; Future; Expected date: 09/16/2024    2. Referred otalgia of right ear    3. Tinnitus, bilateral        Reassured negative aural exam. Discussed referred pain secondary to musculoskeletal versus neuralgia etiologies. Patient having right-sided headaches with right ear pain, so referred to our Headache Dept here. Has seen Dr. Jesusita Vergara and Dr. Edna Gillis in the past. Recently started back on Topamax but would consider Botox if indicated.   Scheduled for next available audiogram due to constant tinnitus. Last audiogram in 2018 was WNL AU. No evidence of any BPPV today bilaterally.    Patient encouraged to return to clinic if symptoms worsen/persist and as needed for further ENT symptoms or concerns.       No follow-ups on file.

## 2024-09-10 ENCOUNTER — PATIENT MESSAGE (OUTPATIENT)
Dept: AUDIOLOGY | Facility: CLINIC | Age: 54
End: 2024-09-10
Payer: COMMERCIAL

## 2024-09-12 ENCOUNTER — PATIENT MESSAGE (OUTPATIENT)
Dept: OTOLARYNGOLOGY | Facility: CLINIC | Age: 54
End: 2024-09-12
Payer: COMMERCIAL

## 2024-09-12 ENCOUNTER — CLINICAL SUPPORT (OUTPATIENT)
Dept: AUDIOLOGY | Facility: CLINIC | Age: 54
End: 2024-09-12
Payer: COMMERCIAL

## 2024-09-12 DIAGNOSIS — H93.8X1 EAR PRESSURE, RIGHT: Primary | ICD-10-CM

## 2024-09-12 DIAGNOSIS — H93.13 TINNITUS, BILATERAL: ICD-10-CM

## 2024-09-12 NOTE — PROGRESS NOTES
Siobhan Richards was seen on 09/12/2024 for an audiological evaluation. Pt was alone during today's visit. Pertinent complains today include ear pressure AD and tinnitus AU. 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 Otoscopy revealed no cerumen in both ears. The tympanic membrane was visualized AU prior to proceeding with the hearing testing.      Results reveal normal hearing from 250-8000Hz bilaterally.    Speech Reception Thresholds were  5 dBHL for the right ear and 5 dBHL for the left ear.    Word recognition scores were excellent bilaterally.   No  significant changes were noted when compared to previous hearing testing from 11/9/18.      Audiogram results were reviewed in detail with patient and all questions were answered. Results will be reviewed by the referring provider at the completion of this note. Recommend repeat hearing testing if problems arise and bilateral hearing protection with either muffs or in-ear protection in loud noises. All complaints were addressed during this visit to the patient's satisfaction. Plan of care was discussed in detail with the patient, who agreed with the plan as above.

## 2024-10-03 ENCOUNTER — OFFICE VISIT (OUTPATIENT)
Dept: NEUROLOGY | Facility: CLINIC | Age: 54
End: 2024-10-03
Payer: COMMERCIAL

## 2024-10-03 ENCOUNTER — HOSPITAL ENCOUNTER (OUTPATIENT)
Dept: RADIOLOGY | Facility: HOSPITAL | Age: 54
Discharge: HOME | End: 2024-10-03
Attending: NURSE PRACTITIONER
Payer: COMMERCIAL

## 2024-10-03 VITALS
DIASTOLIC BLOOD PRESSURE: 86 MMHG | TEMPERATURE: 97 F | HEIGHT: 67 IN | WEIGHT: 216.69 LBS | BODY MASS INDEX: 34.01 KG/M2 | SYSTOLIC BLOOD PRESSURE: 145 MMHG | RESPIRATION RATE: 17 BRPM | HEART RATE: 81 BPM

## 2024-10-03 DIAGNOSIS — M54.2 CERVICALGIA: ICD-10-CM

## 2024-10-03 DIAGNOSIS — G43.719 INTRACTABLE CHRONIC MIGRAINE WITHOUT AURA AND WITHOUT STATUS MIGRAINOSUS: Primary | ICD-10-CM

## 2024-10-03 PROBLEM — G43.909 MIGRAINE WITHOUT STATUS MIGRAINOSUS, NOT INTRACTABLE: Status: RESOLVED | Noted: 2021-03-23 | Resolved: 2024-10-03

## 2024-10-03 PROCEDURE — 72052 X-RAY EXAM NECK SPINE 6/>VWS: CPT | Mod: 26,,, | Performed by: RADIOLOGY

## 2024-10-03 PROCEDURE — 99999 PR PBB SHADOW E&M-EST. PATIENT-LVL V: CPT | Mod: PBBFAC,,, | Performed by: NURSE PRACTITIONER

## 2024-10-03 PROCEDURE — 72052 X-RAY EXAM NECK SPINE 6/>VWS: CPT | Mod: TC,PO

## 2024-10-03 RX ORDER — SUMATRIPTAN 50 MG/1
50 TABLET, FILM COATED ORAL
Qty: 10 TABLET | Refills: 11 | Status: SHIPPED | OUTPATIENT
Start: 2024-10-03

## 2024-10-03 RX ORDER — GALCANEZUMAB 120 MG/ML
120 INJECTION, SOLUTION SUBCUTANEOUS
Qty: 1 ML | Refills: 11 | Status: SHIPPED | OUTPATIENT
Start: 2024-10-24

## 2024-10-03 NOTE — PATIENT INSTRUCTIONS
Please call our clinic at [phone] or send a message on the [facility] portal if there are any changes to the plan described below, for example,if you are not contacted for the requested tests, referral(s) within one week, if you are unable to receive the medications prescribed, or if you feel you need to change the treatment course for any reason.     TESTING: none     REFERRALS: none     PREVENTION (use daily regardless of headache):  - Start Magnesium in ONE of the following preparations -               1. Magnesium oxide 800mg daily (the most common over the counter kind, may causes loose stools)              2. Magnesium citrate 400-500mg daily (harder to find, but more neutral on the bowels)              3. Magnesium glycinate 400mg daily (hardest to find, look online, but most bowel-neutral, best absorbed)   - Continue Topamax 50 mg daily, consider increasing dose in the future  -Start Emgality, once monthly injection. Take a loading dose of 240 mg the first time (2 syringes of 120 mg), and 120 mg (1 syringe) every 28 days thereafter  - Consider Botox in the future     AS-NEEDED TREATMENT (use total no more than 10 days per month unless otherwise stated):  - Start Imitrex 50 mg as needed. Ok to repeat dose 2 hours later. No more than 200 mg per 24 hours.     OTHER:   - Decrease daily diet coke per day

## 2024-10-03 NOTE — PROGRESS NOTES
Date of service: 10/3/2024  Referring provider: Macey Drake    Subjective:      Chief complaint: Headache       Patient ID: Siobhan Richards is a 53 y.o. who presents today to re-establish care for headache.     History of Present Illness    ORIGINAL HEADACHE HISTORY -   Age at onset and course over time: childhood. She was last seen 02/2022 by Dr. Rooney and lost to follow up. Since then, she has been managed by PCP. On average, she reports 2-3+ migraines per week. She did have an increase in migraines over the last month and was re-started on Topamax 50 mg daily by PCP which she feels has helped.   She also has migraine with aura (scintillating scotoma) with 5% of migraines. Otherwise information below is reviewed and verified with no changes made.      ---------------------------------  She was seen by Dr. Vergara (2019) and Dr. Rooney (2022) in the past.     History of Present Illness:               51Y RHF with chronic migraine, cervical spondylosis, HL, hiatal hernia who presents for initial evaluation of headache. Of note she had seen Dr. Vergara in 3/19 at which time she was diagnosed with chronic migraine and vestibular migraine and prescribed venlafaxine for prevention, for acute management rizatriptan po and sumatriptan sc. She mentions that venlafaxine did not work.  She is here to re establish care.                  She mentions that she has a neck positional headache which is more when she bends her neck to the right maybe or she wakes up from sleep, it's mainly neck pain and then from there progresses and into the right side , they can be triggered for 2 minutes of laying her head on 's shoulder, the neck ones typically progress into the nausea/vomiting. She will have light/sound sensitivity with it.                  The sinus headaches she describes as more fullness around the eyes, into the eyes,  frontally, top of head, variable locations, these tend to get moderate to severe. She  "has light/sound sensitivity as well, nausea/vomiting with these as well. She treats these with excedrin, mucinex, tylenol and improves it. She is not taking imitrex currently. She uses excedrin about 2 days/week only. She will use mucinex about 2 days a week. She tries to limit her OTC use.                  She is currently on TPX 50mg bid and feels it's helping her with the intensity/frequency and had been initially on it for weight loss. She did not ice that headaches were not as severe as they had been before. When she was off it headaches worsened.  She has been having more recently fullness on Rt ear and went to ENT where she was told it could be ETD vs. Vestibular migraine.      Headache history confirmed on today's visit:  Age at onset and course over time: headaches since childhood, worse with puberty, ovulation, and childbirth. She reported multiple headache types.   History from Dr. Vergara (prior headache specialist): "One is triggered by smell and is acute, unilateral, with nausea and vomiting, resolves after a couple of hours (very quick progression). 2nd is one that occurs upon waking, from occipital to eye, lastrs one day, photo and nausea and vomiting but not always. She associated this with a lipoma that she had removed. This occurs 2-3 times per month. Third headache is a "sinus headache" that occurs with weather changes, sometimes escalates to the 2nd type, this is the most frequent type. She was referred by ENT. She also has dizziness but this is intermittent. She feels off balance. Gets dizzy when looking up 9 (in [place]), sec to minutes. No other types of dizziness."  She was diagnosed with chronic migraine and venlafaxine was prescribed which was ineffective. Currently she is on TPX 50 mg bid with improvement intensity/frequency. Feels either related to sinus or neck movements and continues to find she has different heaache types depending on trigger.       Location: orbital, occipital, cervical "   Quality:  [x] Stabbing [x] Pressure [] Tight [x] Throbbing/pounding [x] Sharp    Duration: [] Seconds [] Minutes [x] Hours [x] Days [] Constant   Frequency: [] Daily [x] Weekly [] Monthly   How many days per month is your head or neck 100% pain free:   Headaches awaken at night?:   3  Worst time of day: upon waking   Intensity of pain: at best 1/10, at worst 10/10   Associated with: [x] Photophobia [x]  Phonophobia [x] Osmophobia [x] Loss of appetite [x] Nausea [x] Vomiting   [x] Dizziness [x] Vertigo [x] Ringing in the ears [x] Blurry vision [x] Double vision  [x] Anxiety/Anger/Irritability [x] Problems with concentration [x] Problems with memory [x] Problems with task completion   [x] Problems with relaxation [x] Neck tightness/ neck pain [x] Nasal congestion [x] Nasal or sinus pressure [x] Aura   Alleviated by:  [x] Sleep [x] Darkness [x] Local pressure [x] Massage [x] Heat [] Ice [] Menses [x] Medication  Exacerbated by:  [x] Fatigue [x] Light [x] Noise [x] Smells [x] Coughing [x] Sneezing  [x] Bending over [x] Change in weather [] Ovulation [] Menses [x] Alcohol [x] Stress [x]  Food  Ipsilateral autonomic: [] nasal congestion [] lacrimation [] ptosis [] injection [] edema [] foreign body sensation [] ear fullness   ICP:  [] transient visual obscurations  [x] tinnitus - high pitched, steady, bilateral   [] positional headache  [] non-positional     Bowl Habits: [] Normal [] Constipation [] Diarrhea IBS   Caffeine intake: 3 Diet cokes per day   Sleep habits: good   Water intake: 60 oz per day    Eye Exam: up to date   Family history of migraine: mother, two sisters, 3 sons    Gyn status (if female) (birth control with estrogen, hysterectomy): menopausal   History of asthma, cancer, glaucoma, kidney stones, CVA and osteoporosis: none     HIT 6: 63    Current acute treatment:  Excedrin  Advil  Sudafed     Current prevention:  Topamax    Previously tried/failed acute treatment:  Tylenol  Aspirin  Motrin  Aleve      Previously tried/failed preventative treatment:  Wellbutrin     Considerations:     Review of patient's allergies indicates:  No Known Allergies  Current Outpatient Medications   Medication Sig Dispense Refill    aspirin-acetaminophen-caffeine 250-250-65 mg (EXCEDRIN MIGRAINE) 250-250-65 mg per tablet       dextroamphetamine-amphetamine (ADDERALL) 20 mg tablet Take 1 tablet by mouth 2 (two) times a day. # 2 60 tablet 0    ergocalciferol (ERGOCALCIFEROL) 50,000 unit Cap Take 1 capsule (50,000 Units total) by mouth every 7 days. 12 capsule 2    esomeprazole (NEXIUM) 40 MG capsule TAKE 1 CAPSULE BY MOUTH BEFORE BREAKFAST. 30 capsule 11    multivitamin (DAILY VITAMIN ORAL)       ondansetron (ZOFRAN-ODT) 4 MG TbDL Take 1 tablet (4 mg total) by mouth every 8 (eight) hours as needed (nausea). 30 tablet 3    pantoprazole (PROTONIX) 40 MG tablet Take 40 mg by mouth once daily.      pravastatin (PRAVACHOL) 40 MG tablet TAKE 1 TABLET BY MOUTH EVERY DAY 90 tablet 3    topiramate (TOPAMAX) 50 MG tablet Take 1 tablet (50 mg total) by mouth once daily. 90 tablet 1    albuterol (VENTOLIN HFA) 90 mcg/actuation inhaler Inhale 2 puffs into the lungs every 6 (six) hours as needed for Wheezing. Rescue 18 g 0    colesevelam (WELCHOL) 625 mg tablet Take 1 tablet (625 mg total) by mouth 2 (two) times daily with meals. Diarrhea (Patient not taking: Reported on 4/16/2024) 60 tablet 1    dicyclomine (BENTYL) 10 MG capsule Take 1 capsule (10 mg total) by mouth 4 (four) times daily. Prn stomach cramps (Patient not taking: Reported on 10/3/2024) 30 capsule 0    furosemide (LASIX) 20 MG tablet TAKE 1 TABLET ONCE A DAY AS NEEDED SWELLING (Patient not taking: Reported on 10/3/2024)  12    [START ON 10/24/2024] galcanezumab-gnlm (EMGALITY PEN) 120 mg/mL PnIj Inject 1 mL (120 mg total) into the skin every 28 days. 1 mL 11    galcanezumab-gnlm 120 mg/mL PnIj Inject 240 mg (2 injections) subcutaneous at separate sites, once (loading dose). Start  maintenance dose 28 days later 2 mL 0    lisdexamfetamine (VYVANSE) 20 MG capsule Take 1 capsule (20 mg total) by mouth every morning. #2 (Patient not taking: Reported on 10/3/2024) 30 capsule 0    oxybutynin (DITROPAN-XL) 5 MG TR24 Take 1 tablet (5 mg total) by mouth once daily. Bladder (Patient not taking: Reported on 10/3/2024) 90 tablet 2    promethazine (PHENERGAN) 25 MG tablet Take 1 tablet (25 mg total) by mouth every 6 (six) hours as needed for Nausea. (Patient not taking: Reported on 10/3/2024) 30 tablet 0    semaglutide, weight loss, (WEGOVY) 0.25 mg/0.5 mL PnIj Inject 0.25 mg into the skin every 7 days. (Patient not taking: Reported on 10/3/2024) 4 mL 0    sumatriptan (IMITREX) 50 MG tablet Take 1 tablet (50 mg total) by mouth as needed for Migraine. 10 tablet 11     No current facility-administered medications for this visit.       Past Medical History  Past Medical History:   Diagnosis Date    Allergy     Arthritis     COVID-19 virus infection     2021    Dizziness     Food allergy     Food: banana, cholocate, milk, nuts (skin testing)    GERD (gastroesophageal reflux disease)     Headache(784.0)     Hiatal hernia     Hyperlipidemia     Obesity     Personal history of colonic polyps 2024    Julian Robledo MD    PONV (postoperative nausea and vomiting)     Snoring        Past Surgical History  Past Surgical History:   Procedure Laterality Date    BREAST BIOPSY Right     core bx.    BREAST BIOPSY Right     removal of duct    BREAST SURGERY   (?)    Rt duct     SECTION      CHOLECYSTECTOMY      COLONOSCOPY  2024    Julian Robledo MD    FIXATION OF SYNDESMOSIS OF ANKLE Right 2019    Procedure: FIXATION, SYNDESMOSIS, ANKLE;  Surgeon: Fuentes Devries MD;  Location: [facility] OR;  Service: Orthopedics;  Laterality: Right;    FRACTURE SURGERY  2019    Rt ankle    LIPOMA RESECTION Left 10/24/2018    Procedure: EXCISION, LIPOMA - Left Scapula;  Surgeon: Smith  TAMERA Kenyon MD;  Location: [facility];  Service: General;  Laterality: Left;    OPEN REDUCTION AND INTERNAL FIXATION (ORIF) OF INJURY OF ANKLE Right 2019    Procedure: ORIF, ANKLE;  Surgeon: Fuentes Devries MD;  Location: [facility];  Service: Orthopedics;  Laterality: Right;       Family History  Family History   Problem Relation Name Age of Onset    Heart disease Mother stents     Lung cancer Father Lung cancer     Goiter Father Lung cancer     Cancer Father Lung cancer     Thyroid cancer Sister Thyroid cancer     Cancer Sister Thyroid cancer     Heart attack Brother      Cancer Paternal Aunt Pancreatic-         Pancreatic    Cancer Maternal Uncle Colon         Colon    Thyroid nodules Sister         Social History  Social History     Socioeconomic History    Marital status:     Number of children: 3   Tobacco Use    Smoking status: Former     Current packs/day: 0.00     Types: Cigarettes    Smokeless tobacco: Never   Substance and Sexual Activity    Alcohol use: No    Drug use: No     Social Drivers of Health     Financial Resource Strain: Low Risk  (2024)    Overall Financial Resource Strain (CARDIA)     Difficulty of Paying Living Expenses: Not very hard   Food Insecurity: No Food Insecurity (2024)    Hunger Vital Sign     Worried About Running Out of Food in the Last Year: Never true     Ran Out of Food in the Last Year: Never true   Transportation Needs: No Transportation Needs (2024)    PRAPARE - Transportation     Lack of Transportation (Medical): No     Lack of Transportation (Non-Medical): No   Physical Activity: Insufficiently Active (2024)    Exercise Vital Sign     Days of Exercise per Week: 1 day     Minutes of Exercise per Session: 20 min   Stress: No Stress Concern Present (2024)    [employer] - Occupational Stress Questionnaire     Feeling of Stress : Only a little   Housing Stability: Low Risk  (2024)    Housing Stability  Vital Sign     Unable to Pay for Housing in the Last Year: No     Number of Places Lived in the Last Year: 1     Unstable Housing in the Last Year: No        Review of Systems  14-point review of systems as follows:   No check darren indicates NEGATIVE response   Constitutional: [] weight loss [] change to appetite   Eyes: [] change in vision [] double vision   Ears, nose, mouth, throat: [] frequent nose bleeds [] ringing in the ears   Respiratory: [] cough [] wheezing   Cardiovascular: [] chest pain [] palpitations   Gastrointestinal: [] jaundice [] nausea/vomiting   Genitourinary: [] incontinence [] burning with urination   Hematologic/lymphatic: [] easy bruising/bleeding [] night sweats   Neurological: [] numbness [] weakness   Endocrine: [] fatigue [] heat/cold intolerance   Allergy/Immunologic: [] fevers [] chills   Musculoskeletal: [] muscle pain [] joint pain   Psychiatric: [] thoughts of harming self/others [] depression   Integumentary: [] rashes [] sores that do not heal     Objective:        Vitals:    10/03/24 1535   BP: (!) 145/86   Pulse: 81   Resp: 17   Temp: 97.3 °F (36.3 °C)     Body mass index is 33.94 kg/m².    Constitutional: appears in no acute distress, well-developed, well-nourished     Eyes: normal conjunctiva, PERRLA    Ears, nose, mouth, throat: external appearance of ears and nose normal, hearing intact     Cardiovascular: n/a     Respiratory: unlabored respirations    Gastrointestinal: no visible abdominal masses, no guarding, no visible hernia    Musculoskeletal: normal tone in all four extremities. No abnormal movements. No pronator drift. No orbit. Symmetric finger tapping. Normal station. Normal regular gait.       Spine:   CERVICAL SPINE:  ROM: normal   MUSCLE SPASM: no   FACET LOADING: no   SPURLING: no  PERRI / LEIGHTON tender: no     Psychiatric: normal judgment and insight. Oriented to person, place, and time.     Neurologic:   Cortical functions: recent and remote memory intact, normal  attention span and concentration, speech fluent, adequate fund of knowledge   Cranial nerves: visual fields full, PERRLA, EOMI, symmetric facial strength, hearing intact, palate elevates symmetrically, shoulder shrug 5/5, tongue protrudes midline   Reflexes: 2+ in the upper and lower extremities, no Jorge  Sensation: intact to temperature throughout   Coordination: normal finger to nose, heel to shin, tandem gait     Data Review:     I have personally reviewed the referring provider's notes, labs, & imaging made available to me today.      RADIOLOGY STUDIES:  I have personally reviewed the pertinent images performed.       No results found for this or any previous visit.    Lab Results   Component Value Date     04/20/2024    K 4.5 04/20/2024     04/20/2024    CO2 25 04/20/2024    BUN 22 (H) 04/20/2024    CREATININE 0.78 04/20/2024    GLU 91 04/20/2024    HGBA1C 5.0 12/28/2023    AST 23 04/20/2024    ALT 19 04/20/2024    ALBUMIN 4.2 04/20/2024    PROT 7.2 04/20/2024    BILITOT 0.5 04/20/2024    CHOL 193 04/20/2024    HDL 54 04/20/2024    LDLCALC 116.0 04/20/2024    TRIG 115 04/20/2024       Lab Results   Component Value Date    WBC 8.66 04/20/2024    HGB 14.6 04/20/2024    HCT 43.0 04/20/2024    MCV 89 04/20/2024     04/20/2024       Lab Results   Component Value Date    TSH 1.800 04/20/2024           Assessment & Plan:       Problem List Items Addressed This Visit          Neuro    Intractable chronic migraine without aura and without status migrainosus - Primary    Overview     Strong family history of migraine onset in childhood with expected flares during typical hormonal milestones.  Gradually progressive to chronic migraine over time.  Very typical symptoms of weather sensitivity, osmophobia, nausea, light and noise sensitivity.  We discussed that all three headache types including the sinus headaches are actually migrainous in nature.     At this point she is only on topiramate prevention  which is largely ineffective.  Higher doses were not tolerated due to mental fog.  I advised either beginning venlafaxine which is the only weight neutral option we have left versus a CGRP antagonist.  At this point we elected to start venlafaxine.  If this is failed week and then pursue Emgality or Botox depending on her choice.     Headaches are typically unilateral, moderate to severe in intensity, worsen with activity, pounding in quality and associated with sensitivity to light and sound.     Gradual progression pattern, lack of red flag features on history, and normal neurological exam are reassuring for primary as opposed to secondary etiology of headaches thus imaging will not be pursued for this history and this exam at this time.    Continue Topamax 50 mg daily. Start Magnesium and Emgality, once monthly injection. Take a loading dose of 240 mg the first time (2 syringes of 120 mg), and 120 mg (1 syringe) every 28 days thereafter. Consider Botox and Venlafaxine in the future. Start Imitrex 50 mg as needed. Ok to repeat dose 2 hours later. No more than 200 mg per 24 hours. Decrease daily diet coke per day. Headache diary.           Relevant Medications    galcanezumab-gnlm 120 mg/mL PnIj    galcanezumab-gnlm (EMGALITY PEN) 120 mg/mL PnIj (Start on 10/24/2024)    sumatriptan (IMITREX) 50 MG tablet       Orthopedic    Cervicalgia    Overview     Left more than right axial neck pain especially overnight. Will repeat x-ray which previously showed degernation.  I think bulk of her neck pain will improve if her migraines are better controlled but we may need to consider trigger points or CMBB in the future         Relevant Orders    X-Ray Cervical Spine 5 View With Flex And Ext    Ambulatory referral/consult to Physical/Occupational Therapy           Please call our clinic at [phone] or send a message on the [facility] portal if there are any changes to the plan described below, for example,if you are not  contacted for the requested tests, referral(s) within one week, if you are unable to receive the medications prescribed, or if you feel you need to change the treatment course for any reason.     TESTING: none     REFERRALS: none     PREVENTION (use daily regardless of headache):  - Start Magnesium in ONE of the following preparations -               1. Magnesium oxide 800mg daily (the most common over the counter kind, may causes loose stools)              2. Magnesium citrate 400-500mg daily (harder to find, but more neutral on the bowels)              3. Magnesium glycinate 400mg daily (hardest to find, look online, but most bowel-neutral, best absorbed)   - Continue Topamax 50 mg daily, consider increasing dose in the future  -Start Emgality, once monthly injection. Take a loading dose of 240 mg the first time (2 syringes of 120 mg), and 120 mg (1 syringe) every 28 days thereafter  - Consider Botox and Venlafaxine in the future     AS-NEEDED TREATMENT (use total no more than 10 days per month unless otherwise stated):  - Start Imitrex 50 mg as needed. Ok to repeat dose 2 hours later. No more than 200 mg per 24 hours.     OTHER:   - Decrease daily diet coke per day   - Headache diary       Follow up in about 3 months (around 1/3/2025).       SEVERIANO ValentineC      I have spent 60 minutes of total time on the total encounter which includes face to face time and non-face to face time preparing to see the patient (eg, review of labs, previous encounters, care everywhere), obtaining and/or reviewing separately obtained history, documenting clinical information in the electronic or health record, independently interpreting results, and communicating results to the patient/family/caregiver, or care coordination.

## 2024-10-09 ENCOUNTER — TELEPHONE (OUTPATIENT)
Dept: NEUROLOGY | Facility: CLINIC | Age: 54
End: 2024-10-09
Payer: COMMERCIAL

## 2024-10-09 NOTE — TELEPHONE ENCOUNTER
----- Message from Bhavna sent at 10/9/2024  8:32 AM CDT -----  Regarding: Unable to Reach Patient to Schedule PT  Good Morning,    The physical therapy department received your order to get the attached patient scheduled for an evaluation. We have reached out to the patient to schedule her for an evaluation; however, we have been unsuccessful in reaching the patient.      We wanted you to be aware that your patient has not started therapy. If you speak with them again about starting therapy please have them reach out to us at [phone] to get scheduled.      Sincerely,    Bhavna Heath

## 2024-10-10 ENCOUNTER — TELEPHONE (OUTPATIENT)
Dept: NEUROLOGY | Facility: CLINIC | Age: 54
End: 2024-10-10
Payer: COMMERCIAL

## 2024-10-10 ENCOUNTER — PATIENT MESSAGE (OUTPATIENT)
Dept: NEUROLOGY | Facility: CLINIC | Age: 54
End: 2024-10-10
Payer: COMMERCIAL

## 2024-10-10 NOTE — TELEPHONE ENCOUNTER
The prior authorization for Siobhan Richards's Ajovy prescription has been APPROVED FROM 10/10/24 TO 4/10/25 with copayment of $24.98.

## 2024-10-23 ENCOUNTER — PATIENT MESSAGE (OUTPATIENT)
Dept: NEUROLOGY | Facility: CLINIC | Age: 54
End: 2024-10-23
Payer: COMMERCIAL

## 2024-10-28 ENCOUNTER — PATIENT MESSAGE (OUTPATIENT)
Dept: FAMILY MEDICINE | Facility: CLINIC | Age: 54
End: 2024-10-28
Payer: COMMERCIAL

## 2024-10-28 DIAGNOSIS — E55.9 VITAMIN D DEFICIENCY: ICD-10-CM

## 2024-10-28 DIAGNOSIS — R53.83 FATIGUE, UNSPECIFIED TYPE: ICD-10-CM

## 2024-10-28 DIAGNOSIS — E78.2 MIXED HYPERLIPIDEMIA: Primary | ICD-10-CM

## 2024-10-28 DIAGNOSIS — Z00.00 WELLNESS EXAMINATION: ICD-10-CM

## 2024-10-28 RX ORDER — PRAVASTATIN SODIUM 40 MG/1
40 TABLET ORAL DAILY
Qty: 90 TABLET | Refills: 2 | Status: SHIPPED | OUTPATIENT
Start: 2024-10-28 | End: 2025-10-28

## 2024-10-30 ENCOUNTER — LAB VISIT (OUTPATIENT)
Dept: LAB | Facility: HOSPITAL | Age: 54
End: 2024-10-30
Attending: INTERNAL MEDICINE
Payer: COMMERCIAL

## 2024-10-30 DIAGNOSIS — E78.2 MIXED HYPERLIPIDEMIA: ICD-10-CM

## 2024-10-30 DIAGNOSIS — R53.83 FATIGUE, UNSPECIFIED TYPE: ICD-10-CM

## 2024-10-30 DIAGNOSIS — Z00.00 WELLNESS EXAMINATION: ICD-10-CM

## 2024-10-30 DIAGNOSIS — E55.9 VITAMIN D DEFICIENCY: ICD-10-CM

## 2024-10-30 LAB
25(OH)D3+25(OH)D2 SERPL-MCNC: 27 NG/ML (ref 30–96)
ALBUMIN SERPL BCP-MCNC: 4.2 G/DL (ref 3.5–5.2)
ALP SERPL-CCNC: 59 U/L (ref 40–150)
ALT SERPL W/O P-5'-P-CCNC: 17 U/L (ref 10–44)
ANION GAP SERPL CALC-SCNC: 10 MMOL/L (ref 8–16)
AST SERPL-CCNC: 14 U/L (ref 10–40)
BILIRUB SERPL-MCNC: 0.3 MG/DL (ref 0.1–1)
BUN SERPL-MCNC: 17 MG/DL (ref 6–20)
CALCIUM SERPL-MCNC: 9.9 MG/DL (ref 8.7–10.5)
CHLORIDE SERPL-SCNC: 107 MMOL/L (ref 95–110)
CHOLEST SERPL-MCNC: 193 MG/DL (ref 120–199)
CHOLEST/HDLC SERPL: 4 {RATIO} (ref 2–5)
CO2 SERPL-SCNC: 24 MMOL/L (ref 23–29)
CREAT SERPL-MCNC: 0.9 MG/DL (ref 0.5–1.4)
ERYTHROCYTE [DISTWIDTH] IN BLOOD BY AUTOMATED COUNT: 12.4 % (ref 11.5–14.5)
EST. GFR  (NO RACE VARIABLE): >60 ML/MIN/1.73 M^2
GLUCOSE SERPL-MCNC: 96 MG/DL (ref 70–110)
HCT VFR BLD AUTO: 42.5 % (ref 37–48.5)
HDLC SERPL-MCNC: 48 MG/DL (ref 40–75)
HDLC SERPL: 24.9 % (ref 20–50)
HGB BLD-MCNC: 14.8 G/DL (ref 12–16)
LDLC SERPL CALC-MCNC: 123.8 MG/DL (ref 63–159)
MCH RBC QN AUTO: 31.6 PG (ref 27–31)
MCHC RBC AUTO-ENTMCNC: 34.8 G/DL (ref 32–36)
MCV RBC AUTO: 91 FL (ref 82–98)
NONHDLC SERPL-MCNC: 145 MG/DL
PLATELET # BLD AUTO: 386 K/UL (ref 150–450)
PMV BLD AUTO: 9.5 FL (ref 9.2–12.9)
POTASSIUM SERPL-SCNC: 4.3 MMOL/L (ref 3.5–5.1)
PROT SERPL-MCNC: 7.9 G/DL (ref 6–8.4)
RBC # BLD AUTO: 4.68 M/UL (ref 4–5.4)
SODIUM SERPL-SCNC: 141 MMOL/L (ref 136–145)
T4 FREE SERPL-MCNC: 0.86 NG/DL (ref 0.71–1.51)
TRIGL SERPL-MCNC: 106 MG/DL (ref 30–150)
TSH SERPL DL<=0.005 MIU/L-ACNC: 1.83 UIU/ML (ref 0.4–4)
WBC # BLD AUTO: 9.1 K/UL (ref 3.9–12.7)

## 2024-10-30 PROCEDURE — 84439 ASSAY OF FREE THYROXINE: CPT | Performed by: INTERNAL MEDICINE

## 2024-10-30 PROCEDURE — 85027 COMPLETE CBC AUTOMATED: CPT | Performed by: INTERNAL MEDICINE

## 2024-10-30 PROCEDURE — 84443 ASSAY THYROID STIM HORMONE: CPT | Performed by: INTERNAL MEDICINE

## 2024-10-30 PROCEDURE — 80053 COMPREHEN METABOLIC PANEL: CPT | Performed by: INTERNAL MEDICINE

## 2024-10-30 PROCEDURE — 36415 COLL VENOUS BLD VENIPUNCTURE: CPT | Mod: PO | Performed by: INTERNAL MEDICINE

## 2024-10-30 PROCEDURE — 80061 LIPID PANEL: CPT | Performed by: INTERNAL MEDICINE

## 2024-10-30 PROCEDURE — 83695 ASSAY OF LIPOPROTEIN(A): CPT | Performed by: INTERNAL MEDICINE

## 2024-10-30 PROCEDURE — 82306 VITAMIN D 25 HYDROXY: CPT | Performed by: INTERNAL MEDICINE

## 2024-10-31 DIAGNOSIS — E55.9 VITAMIN D DEFICIENCY: Primary | ICD-10-CM

## 2024-10-31 RX ORDER — ERGOCALCIFEROL 1.25 MG/1
50000 CAPSULE ORAL
Qty: 12 CAPSULE | Refills: 2 | Status: SHIPPED | OUTPATIENT
Start: 2024-10-31

## 2024-11-04 LAB — LPA SERPL-MCNC: 45 MG/DL (ref 0–30)

## 2024-11-16 ENCOUNTER — OFFICE VISIT (OUTPATIENT)
Dept: URGENT CARE | Facility: CLINIC | Age: 54
End: 2024-11-16
Payer: COMMERCIAL

## 2024-11-16 VITALS
TEMPERATURE: 99 F | OXYGEN SATURATION: 98 % | BODY MASS INDEX: 33.9 KG/M2 | DIASTOLIC BLOOD PRESSURE: 73 MMHG | SYSTOLIC BLOOD PRESSURE: 134 MMHG | HEART RATE: 69 BPM | WEIGHT: 216 LBS | HEIGHT: 67 IN | RESPIRATION RATE: 15 BRPM

## 2024-11-16 DIAGNOSIS — J02.9 SORE THROAT: Primary | ICD-10-CM

## 2024-11-16 DIAGNOSIS — R53.83 FATIGUE, UNSPECIFIED TYPE: ICD-10-CM

## 2024-11-16 DIAGNOSIS — J06.9 UPPER RESPIRATORY TRACT INFECTION, UNSPECIFIED TYPE: ICD-10-CM

## 2024-11-16 LAB
CTP QC/QA: YES
MOLECULAR STREP A: NEGATIVE
POC MOLECULAR INFLUENZA A AGN: NEGATIVE
POC MOLECULAR INFLUENZA B AGN: NEGATIVE
SARS-COV-2 AG RESP QL IA.RAPID: NEGATIVE

## 2024-11-16 PROCEDURE — 87811 SARS-COV-2 COVID19 W/OPTIC: CPT | Mod: QW,S$GLB,, | Performed by: NURSE PRACTITIONER

## 2024-11-16 PROCEDURE — 87502 INFLUENZA DNA AMP PROBE: CPT | Mod: QW,S$GLB,, | Performed by: NURSE PRACTITIONER

## 2024-11-16 PROCEDURE — 99213 OFFICE O/P EST LOW 20 MIN: CPT | Mod: S$GLB,,, | Performed by: NURSE PRACTITIONER

## 2024-11-16 PROCEDURE — 87651 STREP A DNA AMP PROBE: CPT | Mod: QW,S$GLB,, | Performed by: NURSE PRACTITIONER

## 2024-11-16 NOTE — PROGRESS NOTES
"Subjective:      Patient ID: Siobhan Richards is a 54 y.o. female.    Vitals:  height is 5' 7" (1.702 m) and weight is 98 kg (216 lb). Her oral temperature is 98.8 °F (37.1 °C). Her blood pressure is 134/73 and her pulse is 69. Her respiration is 15 and oxygen saturation is 98%.     Chief Complaint: Sore Throat and Nasal Congestion    Patient reports to the Urgent Care with Nasal congestion, Sneezing, Fatigue and Sore throat for about 3-4 days. No OTC meds have been taken, No exposure to Strep or any other kind of sickness. Pain scale is 3/10    Sore Throat   This is a new problem. The current episode started in the past 7 days. The problem has been unchanged. There has been no fever. The pain is at a severity of 3/10. The pain is mild. Associated symptoms include congestion. Pertinent negatives include no abdominal pain, coughing, diarrhea, drooling, ear discharge, ear pain, headaches, hoarse voice, plugged ear sensation, neck pain, shortness of breath, stridor, swollen glands, trouble swallowing or vomiting. She has had no exposure to strep or mono. She has tried nothing for the symptoms. The treatment provided no relief.       HENT:  Positive for congestion and sore throat. Negative for ear pain, ear discharge, drooling and trouble swallowing.    Neck: Negative for neck pain.   Respiratory:  Negative for cough, shortness of breath and stridor.    Gastrointestinal:  Negative for abdominal pain, vomiting and diarrhea.   Neurological:  Negative for headaches.      Objective:     Physical Exam   Constitutional: She is oriented to person, place, and time. She appears well-developed. She is cooperative.  Non-toxic appearance. She does not appear ill. No distress.   HENT:   Head: Normocephalic and atraumatic.   Ears:   Right Ear: Hearing, tympanic membrane, external ear and ear canal normal.   Left Ear: Hearing, tympanic membrane, external ear and ear canal normal.   Nose: Nose normal. No mucosal edema, " rhinorrhea or nasal deformity. No epistaxis. Right sinus exhibits no maxillary sinus tenderness and no frontal sinus tenderness. Left sinus exhibits no maxillary sinus tenderness and no frontal sinus tenderness.   Mouth/Throat: Uvula is midline and mucous membranes are normal. No trismus in the jaw. Normal dentition. No uvula swelling. Posterior oropharyngeal erythema and cobblestoning present. No oropharyngeal exudate, posterior oropharyngeal edema or tonsillar abscesses.   Eyes: Conjunctivae and lids are normal. No scleral icterus.   Neck: Trachea normal and phonation normal. Neck supple. No edema present. No erythema present. No neck rigidity present.   Cardiovascular: Normal rate, regular rhythm, normal heart sounds and normal pulses.   Pulmonary/Chest: Effort normal and breath sounds normal. No respiratory distress. She has no decreased breath sounds. She has no rhonchi.   Abdominal: Normal appearance.   Musculoskeletal: Normal range of motion.         General: No deformity. Normal range of motion.   Neurological: She is alert and oriented to person, place, and time. She exhibits normal muscle tone. Coordination normal.   Skin: Skin is warm, dry, intact, not diaphoretic and not pale.   Psychiatric: Her speech is normal and behavior is normal. Judgment and thought content normal.   Nursing note and vitals reviewed.      Assessment:     1. Sore throat    2. Fatigue, unspecified type    3. Upper respiratory tract infection, unspecified type        Results for orders placed or performed in visit on 11/16/24   POCT Strep A, Molecular    Collection Time: 11/16/24  1:07 PM   Result Value Ref Range    Molecular Strep A, POC Negative Negative     Acceptable Yes    POCT Influenza A/B MOLECULAR    Collection Time: 11/16/24  1:27 PM   Result Value Ref Range    POC Molecular Influenza A Ag Negative Negative    POC Molecular Influenza B Ag Negative Negative     Acceptable Yes    SARS Coronavirus  2 Antigen, POCT Manual Read    Collection Time: 11/16/24  1:29 PM   Result Value Ref Range    SARS Coronavirus 2 Antigen Negative Negative     Acceptable Yes        Plan:       Sore throat  -     POCT Strep A, Molecular  -     SARS Coronavirus 2 Antigen, POCT Manual Read    Fatigue, unspecified type  -     POCT Influenza A/B MOLECULAR    Upper respiratory tract infection, unspecified type      INSTRUCTIONS:  - Rest.  - Drink plenty of fluids.  - Take Tylenol and/or Ibuprofen as directed as needed for fever/pain.  Do not take more than the recommended dose.  - follow up with your PCP within the next 1-2 weeks as needed.  - You must understand that you have received an Urgent Care treatment only and that you may be released before all of your medical problems are known or treated.   - You, the patient, will arrange for follow up care as instructed.   - If your condition worsens or fails to improve we recommend that you receive another evaluation at the ER immediately or contact your PCP to discuss your concerns.   - You can call [phone] or [phone] to help schedule an appointment with the appropriate provider.     -If you smoke cigarettes, it would be beneficial for you to stop.    Monitor for new or worsening symptoms    OTC for symptom control    Recommend follow up with PCP/Pediatrician if symptoms are worsening

## 2024-11-20 ENCOUNTER — PATIENT MESSAGE (OUTPATIENT)
Dept: FAMILY MEDICINE | Facility: CLINIC | Age: 54
End: 2024-11-20
Payer: COMMERCIAL

## 2024-11-20 DIAGNOSIS — B37.2 YEAST INFECTION OF THE SKIN: Primary | ICD-10-CM

## 2024-11-21 RX ORDER — FLUCONAZOLE 150 MG/1
150 TABLET ORAL
Qty: 2 TABLET | Refills: 0 | Status: SHIPPED | OUTPATIENT
Start: 2024-11-21 | End: 2024-11-25

## 2024-11-21 RX ORDER — NYSTATIN AND TRIAMCINOLONE ACETONIDE 100000; 1 [USP'U]/G; MG/G
OINTMENT TOPICAL 2 TIMES DAILY
Qty: 30 G | Refills: 0 | Status: SHIPPED | OUTPATIENT
Start: 2024-11-21

## 2024-12-04 ENCOUNTER — PATIENT MESSAGE (OUTPATIENT)
Dept: NEUROLOGY | Facility: CLINIC | Age: 54
End: 2024-12-04
Payer: COMMERCIAL

## 2024-12-17 ENCOUNTER — PATIENT MESSAGE (OUTPATIENT)
Dept: FAMILY MEDICINE | Facility: CLINIC | Age: 54
End: 2024-12-17
Payer: COMMERCIAL

## 2025-01-03 ENCOUNTER — OFFICE VISIT (OUTPATIENT)
Dept: NEUROLOGY | Facility: CLINIC | Age: 55
End: 2025-01-03
Payer: COMMERCIAL

## 2025-01-03 ENCOUNTER — TELEPHONE (OUTPATIENT)
Dept: NEUROLOGY | Facility: CLINIC | Age: 55
End: 2025-01-03
Payer: COMMERCIAL

## 2025-01-03 VITALS
SYSTOLIC BLOOD PRESSURE: 148 MMHG | RESPIRATION RATE: 17 BRPM | DIASTOLIC BLOOD PRESSURE: 87 MMHG | HEIGHT: 67 IN | TEMPERATURE: 98 F | WEIGHT: 211.63 LBS | BODY MASS INDEX: 33.21 KG/M2 | HEART RATE: 82 BPM

## 2025-01-03 DIAGNOSIS — G43.719 INTRACTABLE CHRONIC MIGRAINE WITHOUT AURA AND WITHOUT STATUS MIGRAINOSUS: Primary | ICD-10-CM

## 2025-01-03 PROCEDURE — 99999 PR PBB SHADOW E&M-EST. PATIENT-LVL V: CPT | Mod: PBBFAC,,, | Performed by: NURSE PRACTITIONER

## 2025-01-03 NOTE — TELEPHONE ENCOUNTER
The prior authorization for Siobhan Richards's Ajovy prescription has been APPROVED FROM 1/3/25 TO 1/3/26 with copayment of $25.00.

## 2025-01-03 NOTE — PROGRESS NOTES
Date of service: 1/3/2025  Referring provider: No ref. provider found    Subjective:      Chief complaint: Headache       Patient ID: Siobhan Richards is a 54 y.o. .    History of Present Illness  INTERVAL HISTORY: 01/03/2025  She presents today for migraine follow up. She continues Ajovy monthly, Topamax 50 mg BID and Imitrex as needed. She reports roughly 50% improvement in migraines. She has not used Imitrex yet and continues over the counter medication as needed. She struggles with Ajovy auto injector monthly due to mechanism of dispenser. Today, she reports roughly one migraine per week. Otherwise information below is reviewed and verified with no changes made.      ORIGINAL HEADACHE HISTORY - 10/03/2024  Age at onset and course over time: childhood. She was last seen 02/2022 by Dr. Rooney and lost to follow up. Since then, she has been managed by PCP. On average, she reports 2-3+ migraines per week. She did have an increase in migraines over the last month and was re-started on Topamax 50 mg daily by PCP which she feels has helped.   She also has migraine with aura (scintillating scotoma) with 5% of migraines. Otherwise information below is reviewed and verified with no changes made.      ---------------------------------  She was seen by Dr. Vergara (2019) and Dr. Rooney (2022) in the past.     History of Present Illness:               51Y RHF with chronic migraine, cervical spondylosis, HL, hiatal hernia who presents for initial evaluation of headache. Of note she had seen Dr. Vergara in 3/19 at which time she was diagnosed with chronic migraine and vestibular migraine and prescribed venlafaxine for prevention, for acute management rizatriptan po and sumatriptan sc. She mentions that venlafaxine did not work.  She is here to re establish care.                  She mentions that she has a neck positional headache which is more when she bends her neck to the right maybe or she wakes up from sleep, it's  "mainly neck pain and then from there progresses and into the right side , they can be triggered for 2 minutes of laying her head on 's shoulder, the neck ones typically progress into the nausea/vomiting. She will have light/sound sensitivity with it.                  The sinus headaches she describes as more fullness around the eyes, into the eyes,  frontally, top of head, variable locations, these tend to get moderate to severe. She has light/sound sensitivity as well, nausea/vomiting with these as well. She treats these with excedrin, mucinex, tylenol and improves it. She is not taking imitrex currently. She uses excedrin about 2 days/week only. She will use mucinex about 2 days a week. She tries to limit her OTC use.                  She is currently on TPX 50mg bid and feels it's helping her with the intensity/frequency and had been initially on it for weight loss. She did not ice that headaches were not as severe as they had been before. When she was off it headaches worsened.  She has been having more recently fullness on Rt ear and went to ENT where she was told it could be ETD vs. Vestibular migraine.      Headache history confirmed on today's visit:  Age at onset and course over time: headaches since childhood, worse with puberty, ovulation, and childbirth. She reported multiple headache types.   History from Dr. Vergara (prior headache specialist): "One is triggered by smell and is acute, unilateral, with nausea and vomiting, resolves after a couple of hours (very quick progression). 2nd is one that occurs upon waking, from occipital to eye, lastrs one day, photo and nausea and vomiting but not always. She associated this with a lipoma that she had removed. This occurs 2-3 times per month. Third headache is a "sinus headache" that occurs with weather changes, sometimes escalates to the 2nd type, this is the most frequent type. She was referred by ENT. She also has dizziness but this is intermittent. " "She feels off balance. Gets dizzy when looking up 9 (in [employer]), sec to minutes. No other types of dizziness."  She was diagnosed with chronic migraine and venlafaxine was prescribed which was ineffective. Currently she is on TPX 50 mg bid with improvement intensity/frequency. Feels either related to sinus or neck movements and continues to find she has different heaache types depending on trigger.       Location: orbital, occipital, cervical   Quality:  [x] Stabbing [x] Pressure [] Tight [x] Throbbing/pounding [x] Sharp    Duration: [] Seconds [] Minutes [x] Hours [x] Days [] Constant   Frequency: [] Daily [x] Weekly [] Monthly   How many days per month is your head or neck 100% pain free:   Headaches awaken at night?:   3  Worst time of day: upon waking   Intensity of pain: at best 1/10, at worst 10/10   Associated with: [x] Photophobia [x]  Phonophobia [x] Osmophobia [x] Loss of appetite [x] Nausea [x] Vomiting   [x] Dizziness [x] Vertigo [x] Ringing in the ears [x] Blurry vision [x] Double vision  [x] Anxiety/Anger/Irritability [x] Problems with concentration [x] Problems with memory [x] Problems with task completion   [x] Problems with relaxation [x] Neck tightness/ neck pain [x] Nasal congestion [x] Nasal or sinus pressure [x] Aura   Alleviated by:  [x] Sleep [x] Darkness [x] Local pressure [x] Massage [x] Heat [] Ice [] Menses [x] Medication  Exacerbated by:  [x] Fatigue [x] Light [x] Noise [x] Smells [x] Coughing [x] Sneezing  [x] Bending over [x] Change in weather [] Ovulation [] Menses [x] Alcohol [x] Stress [x]  Food  Ipsilateral autonomic: [] nasal congestion [] lacrimation [] ptosis [] injection [] edema [] foreign body sensation [] ear fullness   ICP:  [] transient visual obscurations  [x] tinnitus - high pitched, steady, bilateral   [] positional headache  [] non-positional     Bowl Habits: [] Normal [] Constipation [] Diarrhea IBS   Caffeine intake: 3 Diet cokes per day   Sleep habits: good   Water " intake: 60 oz per day    Eye Exam: up to date   Family history of migraine: mother, two sisters, 3 sons    Gyn status (if female) (birth control with estrogen, hysterectomy): menopausal   History of asthma, cancer, glaucoma, kidney stones, CVA and osteoporosis: none     HIT 6: 63    Current acute treatment:  Excedrin  Advil  Sudafed     Current prevention:  Topamax  Ajovy     Previously tried/failed acute treatment:  Tylenol  Aspirin  Motrin  Aleve     Previously tried/failed preventative treatment:  Wellbutrin   Topamax- brain fog in the past     Considerations:     Review of patient's allergies indicates:  No Known Allergies  Current Outpatient Medications   Medication Sig Dispense Refill    aspirin-acetaminophen-caffeine 250-250-65 mg (EXCEDRIN MIGRAINE) 250-250-65 mg per tablet       dextroamphetamine-amphetamine (ADDERALL) 20 mg tablet Take 1 tablet by mouth 2 (two) times a day. # 2 60 tablet 0    ergocalciferol (ERGOCALCIFEROL) 50,000 unit Cap Take 1 capsule (50,000 Units total) by mouth every 7 days. 12 capsule 2    esomeprazole (NEXIUM) 40 MG capsule TAKE 1 CAPSULE BY MOUTH BEFORE BREAKFAST. 30 capsule 11    fremanezumab-vfrm (AJOVY AUTOINJECTOR) 225 mg/1.5 mL autoinjector Inject 1.5 mLs (225 mg total) into the skin every 28 days. 1 each 11    multivitamin (DAILY VITAMIN ORAL)       nystatin-triamcinolone (MYCOLOG) ointment Apply topically 2 (two) times daily. 30 g 0    ondansetron (ZOFRAN-ODT) 4 MG TbDL Take 1 tablet (4 mg total) by mouth every 8 (eight) hours as needed (nausea). 30 tablet 3    pantoprazole (PROTONIX) 40 MG tablet Take 40 mg by mouth once daily.      pravastatin (PRAVACHOL) 40 MG tablet Take 1 tablet (40 mg total) by mouth once daily. 90 tablet 2    promethazine (PHENERGAN) 25 MG tablet Take 1 tablet (25 mg total) by mouth every 6 (six) hours as needed for Nausea. 30 tablet 0    topiramate (TOPAMAX) 50 MG tablet Take 1 tablet (50 mg total) by mouth once daily. 90 tablet 1    albuterol  (VENTOLIN HFA) 90 mcg/actuation inhaler Inhale 2 puffs into the lungs every 6 (six) hours as needed for Wheezing. Rescue 18 g 0    colesevelam (WELCHOL) 625 mg tablet Take 1 tablet (625 mg total) by mouth 2 (two) times daily with meals. Diarrhea (Patient not taking: Reported on 4/16/2024) 60 tablet 1    dicyclomine (BENTYL) 10 MG capsule Take 1 capsule (10 mg total) by mouth 4 (four) times daily. Prn stomach cramps (Patient not taking: Reported on 1/3/2025) 30 capsule 0    furosemide (LASIX) 20 MG tablet TAKE 1 TABLET ONCE A DAY AS NEEDED SWELLING (Patient not taking: Reported on 1/3/2025)  12    galcanezumab-gnlm (EMGALITY PEN) 120 mg/mL PnIj Inject 1 mL (120 mg total) into the skin every 28 days. (Patient not taking: Reported on 1/3/2025) 1 mL 11    galcanezumab-gnlm 120 mg/mL PnIj Inject 240 mg (2 injections) subcutaneous at separate sites, once (loading dose). Start maintenance dose 28 days later (Patient not taking: Reported on 1/3/2025) 2 mL 0    galcanezumab-gnlm 120 mg/mL PnIj Inject 240 mg (2 injections) subcutaneous at separate sites, once (loading dose). Start maintenance dose 28 days later 2 each 0    galcanezumab-gnlm 120 mg/mL Syrg Inject 120 mg into the skin every 28 days. 1 each 11    lisdexamfetamine (VYVANSE) 20 MG capsule Take 1 capsule (20 mg total) by mouth every morning. #2 (Patient not taking: Reported on 1/3/2025) 30 capsule 0    oxybutynin (DITROPAN-XL) 5 MG TR24 Take 1 tablet (5 mg total) by mouth once daily. Bladder (Patient not taking: Reported on 1/3/2025) 90 tablet 2    semaglutide, weight loss, (WEGOVY) 0.25 mg/0.5 mL PnIj Inject 0.25 mg into the skin every 7 days. (Patient not taking: Reported on 1/3/2025) 4 mL 0     No current facility-administered medications for this visit.       Past Medical History  Past Medical History:   Diagnosis Date    Allergy     Arthritis     COVID-19 virus infection     12/2021    Dizziness     Food allergy     Food: banana, cholocate, milk, nuts (skin  testing)    GERD (gastroesophageal reflux disease)     Headache(784.0)     Hiatal hernia     Hyperlipidemia     Obesity     Personal history of colonic polyps 2024    Julian Robledo MD    PONV (postoperative nausea and vomiting)     Snoring        Past Surgical History  Past Surgical History:   Procedure Laterality Date    BREAST BIOPSY Right 2009    core bx.    BREAST BIOPSY Right 2009    removal of duct    BREAST SURGERY   (?)    Rt duct     SECTION      CHOLECYSTECTOMY      COLONOSCOPY  2024    Julian Robledo MD    FIXATION OF SYNDESMOSIS OF ANKLE Right 2019    Procedure: FIXATION, SYNDESMOSIS, ANKLE;  Surgeon: Fuentes Devries MD;  Location: [facility] OR;  Service: Orthopedics;  Laterality: Right;    FRACTURE SURGERY  2019    Rt ankle    LIPOMA RESECTION Left 10/24/2018    Procedure: EXCISION, LIPOMA - Left Scapula;  Surgeon: Smith Kenyon MD;  Location: [facility] OR;  Service: General;  Laterality: Left;    OPEN REDUCTION AND INTERNAL FIXATION (ORIF) OF INJURY OF ANKLE Right 2019    Procedure: ORIF, ANKLE;  Surgeon: Fuentes Devries MD;  Location: [facility] OR;  Service: Orthopedics;  Laterality: Right;       Family History  Family History   Problem Relation Name Age of Onset    Heart disease Mother stents     Lung cancer Father Lung cancer     Goiter Father Lung cancer     Cancer Father Lung cancer     Thyroid cancer Sister Thyroid cancer     Cancer Sister Thyroid cancer     Heart attack Brother      Cancer Paternal Aunt Pancreatic-         Pancreatic    Cancer Maternal Uncle Colon         Colon    Thyroid nodules Sister         Social History  Social History     Socioeconomic History    Marital status:     Number of children: 3   Tobacco Use    Smoking status: Former     Current packs/day: 0.00     Types: Cigarettes     Passive exposure: Past    Smokeless tobacco: Never   Substance and Sexual Activity    Alcohol use: No    Drug use: No     Social  Drivers of Health     Financial Resource Strain: Low Risk  (2/4/2024)    Overall Financial Resource Strain (CARDIA)     Difficulty of Paying Living Expenses: Not very hard   Food Insecurity: No Food Insecurity (2/4/2024)    Hunger Vital Sign     Worried About Running Out of Food in the Last Year: Never true     Ran Out of Food in the Last Year: Never true   Transportation Needs: No Transportation Needs (2/4/2024)    PRAPARE - Transportation     Lack of Transportation (Medical): No     Lack of Transportation (Non-Medical): No   Physical Activity: Insufficiently Active (2/4/2024)    Exercise Vital Sign     Days of Exercise per Week: 1 day     Minutes of Exercise per Session: 20 min   Stress: No Stress Concern Present (2/4/2024)    [employer] [employer] - Occupational Stress Questionnaire     Feeling of Stress : Only a little   Housing Stability: Low Risk  (2/4/2024)    Housing Stability Vital Sign     Unable to Pay for Housing in the Last Year: No     Number of Places Lived in the Last Year: 1     Unstable Housing in the Last Year: No        Review of Systems  14-point review of systems as follows:   No check darren indicates NEGATIVE response   Constitutional: [] weight loss [] change to appetite   Eyes: [] change in vision [] double vision   Ears, nose, mouth, throat: [] frequent nose bleeds [] ringing in the ears   Respiratory: [] cough [] wheezing   Cardiovascular: [] chest pain [] palpitations   Gastrointestinal: [] jaundice [] nausea/vomiting   Genitourinary: [] incontinence [] burning with urination   Hematologic/lymphatic: [] easy bruising/bleeding [] night sweats   Neurological: [] numbness [] weakness   Endocrine: [] fatigue [] heat/cold intolerance   Allergy/Immunologic: [] fevers [] chills   Musculoskeletal: [] muscle pain [] joint pain   Psychiatric: [] thoughts of harming self/others [] depression   Integumentary: [] rashes [] sores that do not heal     Objective:        Vitals:     01/03/25 1513   BP: (!) 148/87   Pulse: 82   Resp: 17   Temp: 98 °F (36.7 °C)     Body mass index is 33.15 kg/m².    General exam:  Alert, cooperative, not in distress  Normocephalic and atraumatic  Pink conjunctiva, anicteric sclera, moist mucous membranes  No cervical lymphadenopathy   No joint swelling or tenderness    Data Review:     I have personally reviewed the referring provider's notes, labs, & imaging made available to me today.      RADIOLOGY STUDIES:  I have personally reviewed the pertinent images performed.       No results found for this or any previous visit.    Lab Results   Component Value Date     10/30/2024    K 4.3 10/30/2024     10/30/2024    CO2 24 10/30/2024    BUN 17 10/30/2024    CREATININE 0.9 10/30/2024    GLU 96 10/30/2024    HGBA1C 5.0 12/28/2023    AST 14 10/30/2024    ALT 17 10/30/2024    ALBUMIN 4.2 10/30/2024    PROT 7.9 10/30/2024    BILITOT 0.3 10/30/2024    CHOL 193 10/30/2024    HDL 48 10/30/2024    LDLCALC 123.8 10/30/2024    TRIG 106 10/30/2024       Lab Results   Component Value Date    WBC 9.10 10/30/2024    HGB 14.8 10/30/2024    HCT 42.5 10/30/2024    MCV 91 10/30/2024     10/30/2024       Lab Results   Component Value Date    TSH 1.831 10/30/2024           Assessment & Plan:       Problem List Items Addressed This Visit       Intractable chronic migraine without aura and without status migrainosus - Primary    Overview     Strong family history of migraine onset in childhood with expected flares during typical hormonal milestones. Gradually progressive to chronic migraine over time. Very typical symptoms of weather sensitivity, osmophobia, nausea, light and noise sensitivity.      Headaches are typically unilateral, moderate to severe in intensity, worsen with activity, pounding in quality and associated with sensitivity to light and sound.     Gradual progression pattern, lack of red flag features on history, and normal neurological exam are  reassuring for primary as opposed to secondary etiology of headaches thus imaging will not be pursued for this history and this exam at this time.    Continue Topamax 50 mg twice daily and Magnesium nightly. Stop Ajovy due to trouble with auto injector. Start Emgality, once monthly injection. Take a loading dose of 240 mg the first time (2 syringes of 120 mg), and 120 mg (1 syringe) every 28 days thereafter. Consider Botox and Venlafaxine in the future. Start Imitrex 50 mg as needed. Ok to repeat dose 2 hours later. No more than 200 mg per 24 hours. Previously prescribed but has not tired yet. Decrease daily diet coke per day. Headache diary.           Relevant Medications    galcanezumab-gnlm 120 mg/mL PnIj    galcanezumab-gnlm 120 mg/mL Syrg             Please call our clinic at [phone] or send a message on the [facility] portal if there are any changes to the plan described below, for example,if you are not contacted for the requested tests, referral(s) within one week, if you are unable to receive the medications prescribed, or if you feel you need to change the treatment course for any reason.     TESTING: none     REFERRALS: none     PREVENTION (use daily regardless of headache):  - Continue Magnesium in ONE of the following preparations -               1. Magnesium oxide 800mg daily (the most common over the counter kind, may causes loose stools)              2. Magnesium citrate 400-500mg daily (harder to find, but more neutral on the bowels)              3. Magnesium glycinate 400mg daily (hardest to find, look online, but most bowel-neutral, best absorbed)   - Continue Topamax 50 mg twice daily. Consider weaning off in the future   - Stop Ajovy due to trouble with auto injector   - Start Emgality, once monthly injection. Take a loading dose of 240 mg the first time (2 syringes of 120 mg), and 120 mg (1 syringe) every 28 days thereafter  - Consider Botox and Venlafaxine in the future     AS-NEEDED  TREATMENT (use total no more than 10 days per month unless otherwise stated):  - Start Imitrex 50 mg as needed. Ok to repeat dose 2 hours later. No more than 200 mg per 24 hours. Previously prescribed but has not tired yet     OTHER:   - Decrease daily diet coke per day   - Headache diary       Follow up in about 3 months (around 4/3/2025).       LETY Valentine      I have spent 20 minutes of total time on the total encounter which includes face to face time and non-face to face time preparing to see the patient (eg, review of labs, previous encounters, care everywhere), obtaining and/or reviewing separately obtained history, documenting clinical information in the electronic or health record, independently interpreting results, and communicating results to the patient/family/caregiver, or care coordination.

## 2025-01-03 NOTE — PATIENT INSTRUCTIONS
Please call our clinic at [phone] or send a message on the [employer] portal if there are any changes to the plan described below, for example,if you are not contacted for the requested tests, referral(s) within one week, if you are unable to receive the medications prescribed, or if you feel you need to change the treatment course for any reason.     TESTING: none     REFERRALS: none     PREVENTION (use daily regardless of headache):  - Continue Magnesium in ONE of the following preparations -               1. Magnesium oxide 800mg daily (the most common over the counter kind, may causes loose stools)              2. Magnesium citrate 400-500mg daily (harder to find, but more neutral on the bowels)              3. Magnesium glycinate 400mg daily (hardest to find, look online, but most bowel-neutral, best absorbed)   - Continue Topamax 50 mg twice daily. Consider weaning off in the future   - Stop Ajovy due to trouble with auto injector   - Start Emgality, once monthly injection. Take a loading dose of 240 mg the first time (2 syringes of 120 mg), and 120 mg (1 syringe) every 28 days thereafter  - Consider Botox and Venlafaxine in the future     AS-NEEDED TREATMENT (use total no more than 10 days per month unless otherwise stated):  - Start Imitrex 50 mg as needed. Ok to repeat dose 2 hours later. No more than 200 mg per 24 hours. Previously prescribed but has not tired yet     OTHER:   - Decrease daily diet coke per day   - Headache diary

## 2025-01-07 ENCOUNTER — PATIENT MESSAGE (OUTPATIENT)
Dept: FAMILY MEDICINE | Facility: CLINIC | Age: 55
End: 2025-01-07
Payer: COMMERCIAL

## 2025-01-07 DIAGNOSIS — E66.811 CLASS 1 OBESITY WITH SERIOUS COMORBIDITY AND BODY MASS INDEX (BMI) OF 33.0 TO 33.9 IN ADULT, UNSPECIFIED OBESITY TYPE: Primary | ICD-10-CM

## 2025-01-07 DIAGNOSIS — E78.2 MIXED HYPERLIPIDEMIA: ICD-10-CM

## 2025-01-07 DIAGNOSIS — K21.00 GASTROESOPHAGEAL REFLUX DISEASE WITH ESOPHAGITIS WITHOUT HEMORRHAGE: ICD-10-CM

## 2025-01-07 DIAGNOSIS — E66.811 OBESITY (BMI 30.0-34.9): ICD-10-CM

## 2025-01-08 DIAGNOSIS — R73.03 PREDIABETES: ICD-10-CM

## 2025-01-08 RX ORDER — TIRZEPATIDE 2.5 MG/.5ML
2.5 INJECTION, SOLUTION SUBCUTANEOUS
Qty: 4 PEN | Refills: 0 | Status: SHIPPED | OUTPATIENT
Start: 2025-01-08

## 2025-01-25 DIAGNOSIS — G43.709 CHRONIC MIGRAINE WITHOUT AURA WITHOUT STATUS MIGRAINOSUS, NOT INTRACTABLE: ICD-10-CM

## 2025-01-26 ENCOUNTER — PATIENT MESSAGE (OUTPATIENT)
Dept: NEUROLOGY | Facility: CLINIC | Age: 55
End: 2025-01-26
Payer: COMMERCIAL

## 2025-01-26 NOTE — TELEPHONE ENCOUNTER
Refill Routing Note   Medication(s) are not appropriate for processing by [facility] for the following reason(s):        Outside of protocol    [facility] action(s):  Route             Appointments  past 12m or future 3m with PCP    Date Provider   Last Visit   7/22/2024 Param Moses MD   Next Visit   Visit date not found Param Moses MD   ED visits in past 90 days: 0        Note composed:9:23 PM 01/25/2025

## 2025-01-27 RX ORDER — TOPIRAMATE 50 MG/1
50 TABLET, FILM COATED ORAL
Qty: 90 TABLET | Refills: 1 | Status: SHIPPED | OUTPATIENT
Start: 2025-01-27

## 2025-02-05 ENCOUNTER — PATIENT MESSAGE (OUTPATIENT)
Dept: NEUROLOGY | Facility: CLINIC | Age: 55
End: 2025-02-05
Payer: COMMERCIAL

## 2025-02-06 ENCOUNTER — OFFICE VISIT (OUTPATIENT)
Dept: NEUROLOGY | Facility: CLINIC | Age: 55
End: 2025-02-06
Payer: COMMERCIAL

## 2025-02-06 VITALS
TEMPERATURE: 97 F | SYSTOLIC BLOOD PRESSURE: 143 MMHG | BODY MASS INDEX: 34.53 KG/M2 | WEIGHT: 220 LBS | HEIGHT: 67 IN | RESPIRATION RATE: 17 BRPM | HEART RATE: 71 BPM | DIASTOLIC BLOOD PRESSURE: 83 MMHG

## 2025-02-06 DIAGNOSIS — G43.719 INTRACTABLE CHRONIC MIGRAINE WITHOUT AURA AND WITHOUT STATUS MIGRAINOSUS: Primary | ICD-10-CM

## 2025-02-06 PROCEDURE — 3008F BODY MASS INDEX DOCD: CPT | Mod: CPTII,S$GLB,, | Performed by: NURSE PRACTITIONER

## 2025-02-06 PROCEDURE — 3079F DIAST BP 80-89 MM HG: CPT | Mod: CPTII,S$GLB,, | Performed by: NURSE PRACTITIONER

## 2025-02-06 PROCEDURE — 99214 OFFICE O/P EST MOD 30 MIN: CPT | Mod: S$GLB,,, | Performed by: NURSE PRACTITIONER

## 2025-02-06 PROCEDURE — 3077F SYST BP >= 140 MM HG: CPT | Mod: CPTII,S$GLB,, | Performed by: NURSE PRACTITIONER

## 2025-02-06 PROCEDURE — 1159F MED LIST DOCD IN RCRD: CPT | Mod: CPTII,S$GLB,, | Performed by: NURSE PRACTITIONER

## 2025-02-06 PROCEDURE — 99999 PR PBB SHADOW E&M-EST. PATIENT-LVL V: CPT | Mod: PBBFAC,,, | Performed by: NURSE PRACTITIONER

## 2025-02-06 RX ORDER — TOPIRAMATE 25 MG/1
TABLET ORAL
Qty: 60 TABLET | Refills: 6 | Status: SHIPPED | OUTPATIENT
Start: 2025-02-06

## 2025-02-06 RX ORDER — PREDNISONE 10 MG/1
TABLET ORAL
Qty: 20 TABLET | Refills: 0 | Status: SHIPPED | OUTPATIENT
Start: 2025-02-06

## 2025-02-06 NOTE — PROGRESS NOTES
Date of service: 2/6/2025  Referring provider: No ref. provider found    Subjective:      Chief complaint: Headache       Patient ID: Siobhan Richards is a 54 y.o. .    History of Present Illness  INTERVAL HISTORY: 02/06/2025  She presents today for follow up. At her last visit, she was switched from Ajovy from Emgality, continues Topamax and Imitrex as needed. Today, she reports ongoing migraine for 5 days with fluctuation in intensity, now with 3/10. She did start Progesterone therapy 2 weeks ago. With this migraine she has had some sharp stabbing features associated with her migraine. She tried Imitrex once which was ineffective then used Sudafed and Excedrin. Otherwise information below is reviewed and verified with no changes made.    INTERVAL HISTORY: 01/03/2025  She presents today for migraine follow up. She continues Ajovy monthly, Topamax 50 mg BID and Imitrex as needed. She reports roughly 50% improvement in migraines. She has not used Imitrex yet and continues over the counter medication as needed. She struggles with Ajovy auto injector monthly due to mechanism of dispenser. Today, she reports roughly one migraine per week. Otherwise information below is reviewed and verified with no changes made.      ORIGINAL HEADACHE HISTORY - 10/03/2024  Age at onset and course over time: childhood. She was last seen 02/2022 by Dr. Rooney and lost to follow up. Since then, she has been managed by PCP. On average, she reports 2-3+ migraines per week. She did have an increase in migraines over the last month and was re-started on Topamax 50 mg daily by PCP which she feels has helped.   She also has migraine with aura (scintillating scotoma) with 5% of migraines. Otherwise information below is reviewed and verified with no changes made.      ---------------------------------  She was seen by Dr. Vergara (2019) and Dr. Rooney (2022) in the past.     History of Present Illness:               51Y RHF with chronic  "migraine, cervical spondylosis, HL, hiatal hernia who presents for initial evaluation of headache. Of note she had seen Dr. Vergara in 3/19 at which time she was diagnosed with chronic migraine and vestibular migraine and prescribed venlafaxine for prevention, for acute management rizatriptan po and sumatriptan sc. She mentions that venlafaxine did not work.  She is here to re establish care.                  She mentions that she has a neck positional headache which is more when she bends her neck to the right maybe or she wakes up from sleep, it's mainly neck pain and then from there progresses and into the right side , they can be triggered for 2 minutes of laying her head on 's shoulder, the neck ones typically progress into the nausea/vomiting. She will have light/sound sensitivity with it.                  The sinus headaches she describes as more fullness around the eyes, into the eyes,  frontally, top of head, variable locations, these tend to get moderate to severe. She has light/sound sensitivity as well, nausea/vomiting with these as well. She treats these with excedrin, mucinex, tylenol and improves it. She is not taking imitrex currently. She uses excedrin about 2 days/week only. She will use mucinex about 2 days a week. She tries to limit her OTC use.                  She is currently on TPX 50mg bid and feels it's helping her with the intensity/frequency and had been initially on it for weight loss. She did not ice that headaches were not as severe as they had been before. When she was off it headaches worsened.  She has been having more recently fullness on Rt ear and went to ENT where she was told it could be ETD vs. Vestibular migraine.      Headache history confirmed on today's visit:  Age at onset and course over time: headaches since childhood, worse with puberty, ovulation, and childbirth. She reported multiple headache types.   History from Dr. Vergara (prior headache specialist): "One is " "triggered by smell and is acute, unilateral, with nausea and vomiting, resolves after a couple of hours (very quick progression). 2nd is one that occurs upon waking, from occipital to eye, lastrs one day, photo and nausea and vomiting but not always. She associated this with a lipoma that she had removed. This occurs 2-3 times per month. Third headache is a "sinus headache" that occurs with weather changes, sometimes escalates to the 2nd type, this is the most frequent type. She was referred by ENT. She also has dizziness but this is intermittent. She feels off balance. Gets dizzy when looking up 9 (in [place]), sec to minutes. No other types of dizziness."  She was diagnosed with chronic migraine and venlafaxine was prescribed which was ineffective. Currently she is on TPX 50 mg bid with improvement intensity/frequency. Feels either related to sinus or neck movements and continues to find she has different heaache types depending on trigger.       Location: orbital, occipital, cervical   Quality:  [x] Stabbing [x] Pressure [] Tight [x] Throbbing/pounding [x] Sharp    Duration: [] Seconds [] Minutes [x] Hours [x] Days [] Constant   Frequency: [] Daily [x] Weekly [] Monthly   How many days per month is your head or neck 100% pain free:   Headaches awaken at night?:   3  Worst time of day: upon waking   Intensity of pain: at best 1/10, at worst 10/10   Associated with: [x] Photophobia [x]  Phonophobia [x] Osmophobia [x] Loss of appetite [x] Nausea [x] Vomiting   [x] Dizziness [x] Vertigo [x] Ringing in the ears [x] Blurry vision [x] Double vision  [x] Anxiety/Anger/Irritability [x] Problems with concentration [x] Problems with memory [x] Problems with task completion   [x] Problems with relaxation [x] Neck tightness/ neck pain [x] Nasal congestion [x] Nasal or sinus pressure [x] Aura   Alleviated by:  [x] Sleep [x] Darkness [x] Local pressure [x] Massage [x] Heat [] Ice [] Menses [x] Medication  Exacerbated by:  [x] " Fatigue [x] Light [x] Noise [x] Smells [x] Coughing [x] Sneezing  [x] Bending over [x] Change in weather [] Ovulation [] Menses [x] Alcohol [x] Stress [x]  Food  Ipsilateral autonomic: [] nasal congestion [] lacrimation [] ptosis [] injection [] edema [] foreign body sensation [] ear fullness   ICP:  [] transient visual obscurations  [x] tinnitus - high pitched, steady, bilateral   [] positional headache  [] non-positional     Bowl Habits: [] Normal [] Constipation [] Diarrhea IBS   Caffeine intake: 3 Diet cokes per day   Sleep habits: good   Water intake: 60 oz per day    Eye Exam: up to date   Family history of migraine: mother, two sisters, 3 sons    Gyn status (if female) (birth control with estrogen, hysterectomy): menopausal   History of asthma, cancer, glaucoma, kidney stones, CVA and osteoporosis: none     HIT 6: 61    Current acute treatment:  Excedrin  Advil  Sudafed   Imitrex     Current prevention:  Topamax  Ajovy     Previously tried/failed acute treatment:  Tylenol  Aspirin  Motrin  Aleve   Maxalt   Relpax  Nurtec    Previously tried/failed preventative treatment:  Wellbutrin   Topamax- brain fog in the past   Effexor- ineffective     Considerations:     Review of patient's allergies indicates:  No Known Allergies  Current Outpatient Medications   Medication Sig Dispense Refill    aspirin-acetaminophen-caffeine 250-250-65 mg (EXCEDRIN MIGRAINE) 250-250-65 mg per tablet       dextroamphetamine-amphetamine (ADDERALL) 20 mg tablet Take 1 tablet by mouth 2 (two) times a day. # 2 60 tablet 0    ergocalciferol (ERGOCALCIFEROL) 50,000 unit Cap Take 1 capsule (50,000 Units total) by mouth every 7 days. 12 capsule 2    esomeprazole (NEXIUM) 40 MG capsule TAKE 1 CAPSULE BY MOUTH BEFORE BREAKFAST. 30 capsule 11    fremanezumab-vfrm (AJOVY AUTOINJECTOR) 225 mg/1.5 mL autoinjector Inject 1.5 mLs (225 mg total) into the skin every 28 days. 1 each 11    galcanezumab-gnlm 120 mg/mL PnIj Inject 240 mg (2 injections)  subcutaneous at separate sites, once (loading dose). Start maintenance dose 28 days later 2 mL 0    galcanezumab-gnlm 120 mg/mL Syrg Inject 120 mg into the skin every 28 days. 1 mL 11    multivitamin (DAILY VITAMIN ORAL)       nystatin-triamcinolone (MYCOLOG) ointment Apply topically 2 (two) times daily. 30 g 0    ondansetron (ZOFRAN-ODT) 4 MG TbDL Take 1 tablet (4 mg total) by mouth every 8 (eight) hours as needed (nausea). 30 tablet 3    pantoprazole (PROTONIX) 40 MG tablet Take 40 mg by mouth once daily.      pravastatin (PRAVACHOL) 40 MG tablet Take 1 tablet (40 mg total) by mouth once daily. 90 tablet 2    promethazine (PHENERGAN) 25 MG tablet Take 1 tablet (25 mg total) by mouth every 6 (six) hours as needed for Nausea. 30 tablet 0    tirzepatide, weight loss, (ZEPBOUND) 2.5 mg/0.5 mL PnIj Inject 2.5 mg into the skin every 7 days. 4 Pen 0    topiramate (TOPAMAX) 50 MG tablet TAKE 1 TABLET BY MOUTH EVERY DAY 90 tablet 1    albuterol (VENTOLIN HFA) 90 mcg/actuation inhaler Inhale 2 puffs into the lungs every 6 (six) hours as needed for Wheezing. Rescue 18 g 0    colesevelam (WELCHOL) 625 mg tablet Take 1 tablet (625 mg total) by mouth 2 (two) times daily with meals. Diarrhea (Patient not taking: Reported on 4/16/2024) 60 tablet 1    dicyclomine (BENTYL) 10 MG capsule Take 1 capsule (10 mg total) by mouth 4 (four) times daily. Prn stomach cramps (Patient not taking: Reported on 2/6/2025) 30 capsule 0    furosemide (LASIX) 20 MG tablet TAKE 1 TABLET ONCE A DAY AS NEEDED SWELLING (Patient not taking: Reported on 9/9/2024)  12    galcanezumab-gnlm (EMGALITY PEN) 120 mg/mL PnIj Inject 1 mL (120 mg total) into the skin every 28 days. (Patient not taking: Reported on 2/6/2025) 1 mL 11    galcanezumab-gnlm 120 mg/mL PnIj Inject 240 mg (2 injections) subcutaneous at separate sites, once (loading dose). Start maintenance dose 28 days later (Patient not taking: Reported on 2/6/2025) 2 mL 0    lisdexamfetamine (VYVANSE) 20  MG capsule Take 1 capsule (20 mg total) by mouth every morning. #2 (Patient not taking: Reported on 2024) 30 capsule 0    oxybutynin (DITROPAN-XL) 5 MG TR24 Take 1 tablet (5 mg total) by mouth once daily. Bladder (Patient not taking: Reported on 2024) 90 tablet 2    predniSONE (DELTASONE) 10 MG tablet 4 tab PO in AM x 2 days, 3 tab in AM x2 days, 2 tab in AM x 2 days, 1 tab in AM x 2 days then stop 20 tablet 0    topiramate (TOPAMAX) 25 MG tablet Take with 50 mg twice daily 60 tablet 6     No current facility-administered medications for this visit.       Past Medical History  Past Medical History:   Diagnosis Date    Allergy     Arthritis     COVID-19 virus infection     2021    Dizziness     Food allergy     Food: banana, cholocate, milk, nuts (skin testing)    GERD (gastroesophageal reflux disease)     Headache(784.0)     Hiatal hernia     Hyperlipidemia     Obesity     Personal history of colonic polyps 2024    Julian Robledo MD    PONV (postoperative nausea and vomiting)     Snoring        Past Surgical History  Past Surgical History:   Procedure Laterality Date    BREAST BIOPSY Right 2009    core bx.    BREAST BIOPSY Right 2009    removal of duct    BREAST SURGERY   (?)    Rt duct     SECTION      CHOLECYSTECTOMY      COLONOSCOPY  2024    Julian Robledo MD    FIXATION OF SYNDESMOSIS OF ANKLE Right 2019    Procedure: FIXATION, SYNDESMOSIS, ANKLE;  Surgeon: Fuentes Devries MD;  Location: [facility];  Service: Orthopedics;  Laterality: Right;    FRACTURE SURGERY  2019    Rt ankle    LIPOMA RESECTION Left 10/24/2018    Procedure: EXCISION, LIPOMA - Left Scapula;  Surgeon: Smith Kenyon MD;  Location: [facility] OR;  Service: General;  Laterality: Left;    OPEN REDUCTION AND INTERNAL FIXATION (ORIF) OF INJURY OF ANKLE Right 2019    Procedure: ORIF, ANKLE;  Surgeon: Fuentes Devries MD;  Location: [facility] OR;  Service: Orthopedics;  Laterality: Right;        Family History  Family History   Problem Relation Name Age of Onset    Heart disease Mother stents     Lung cancer Father Lung cancer     Goiter Father Lung cancer     Cancer Father Lung cancer     Thyroid cancer Sister Thyroid cancer     Cancer Sister Thyroid cancer     Heart attack Brother      Cancer Paternal Aunt Pancreatic-         Pancreatic    Cancer Maternal Uncle Colon         Colon    Thyroid nodules Sister         Social History  Social History     Socioeconomic History    Marital status:     Number of children: 3   Tobacco Use    Smoking status: Former     Current packs/day: 0.00     Types: Cigarettes     Passive exposure: Past    Smokeless tobacco: Never   Substance and Sexual Activity    Alcohol use: No    Drug use: No     Social Drivers of Health     Financial Resource Strain: Low Risk  (2025)    Overall Financial Resource Strain (CARDIA)     Difficulty of Paying Living Expenses: Not hard at all   Food Insecurity: No Food Insecurity (2025)    Hunger Vital Sign     Worried About Running Out of Food in the Last Year: Never true     Ran Out of Food in the Last Year: Never true   Transportation Needs: No Transportation Needs (2024)    PRAPARE - Transportation     Lack of Transportation (Medical): No     Lack of Transportation (Non-Medical): No   Physical Activity: Insufficiently Active (2025)    Exercise Vital Sign     Days of Exercise per Week: 1 day     Minutes of Exercise per Session: 10 min   Stress: No Stress Concern Present (2025)    [employer] [place] [employer] - Occupational Stress Questionnaire     Feeling of Stress : Only a little   Housing Stability: Low Risk  (2024)    Housing Stability Vital Sign     Unable to Pay for Housing in the Last Year: No     Number of Places Lived in the Last Year: 1     Unstable Housing in the Last Year: No        Review of Systems  14-point review of systems as follows:   No check darren indicates NEGATIVE  response   Constitutional: [] weight loss [] change to appetite   Eyes: [] change in vision [] double vision   Ears, nose, mouth, throat: [] frequent nose bleeds [] ringing in the ears   Respiratory: [] cough [] wheezing   Cardiovascular: [] chest pain [] palpitations   Gastrointestinal: [] jaundice [] nausea/vomiting   Genitourinary: [] incontinence [] burning with urination   Hematologic/lymphatic: [] easy bruising/bleeding [] night sweats   Neurological: [] numbness [] weakness   Endocrine: [] fatigue [] heat/cold intolerance   Allergy/Immunologic: [] fevers [] chills   Musculoskeletal: [] muscle pain [] joint pain   Psychiatric: [] thoughts of harming self/others [] depression   Integumentary: [] rashes [] sores that do not heal     Objective:        Vitals:    02/06/25 1421   BP: (!) 143/83   Pulse: 71   Resp: 17   Temp: 97.4 °F (36.3 °C)       Body mass index is 34.46 kg/m².    General exam:  Alert, cooperative, not in distress  Normocephalic and atraumatic  Pink conjunctiva, anicteric sclera, moist mucous membranes  No cervical lymphadenopathy   No joint swelling or tenderness    Data Review:     I have personally reviewed the referring provider's notes, labs, & imaging made available to me today.      RADIOLOGY STUDIES:  I have personally reviewed the pertinent images performed.       No results found for this or any previous visit.    Lab Results   Component Value Date     10/30/2024    K 4.3 10/30/2024     10/30/2024    CO2 24 10/30/2024    BUN 17 10/30/2024    CREATININE 0.9 10/30/2024    GLU 96 10/30/2024    HGBA1C 5.0 12/28/2023    AST 14 10/30/2024    ALT 17 10/30/2024    ALBUMIN 4.2 10/30/2024    PROT 7.9 10/30/2024    BILITOT 0.3 10/30/2024    CHOL 193 10/30/2024    HDL 48 10/30/2024    LDLCALC 123.8 10/30/2024    TRIG 106 10/30/2024       Lab Results   Component Value Date    WBC 9.10 10/30/2024    HGB 14.8 10/30/2024    HCT 42.5 10/30/2024    MCV 91 10/30/2024     10/30/2024        Lab Results   Component Value Date    TSH 1.831 10/30/2024           Assessment & Plan:       Problem List Items Addressed This Visit       Intractable chronic migraine without aura and without status migrainosus - Primary    Overview     Strong family history of migraine onset in childhood with expected flares during typical hormonal milestones. Gradually progressive to chronic migraine over time. Very typical symptoms of weather sensitivity, osmophobia, nausea, light and noise sensitivity.      Headaches are typically unilateral, moderate to severe in intensity, worsen with activity, pounding in quality and associated with sensitivity to light and sound.     Gradual progression pattern, lack of red flag features on history, and normal neurological exam are reassuring for primary as opposed to secondary etiology of headaches thus imaging will not be pursued for this history and this exam at this time.    Exacerbated within the last week likely due to starting hormone replacement.     Increase Topamax to 75 mg twice daily and Magnesium nightly. Continue Imitrex 50 mg as needed. Ok to repeat dose 2 hours later. No more than 200 mg per 24 hours. Start Prednisone taper to help break current cycle. Decrease daily diet coke per day. Headache diary.           Relevant Medications    predniSONE (DELTASONE) 10 MG tablet    topiramate (TOPAMAX) 25 MG tablet               Please call our clinic at [phone] or send a message on the [employer] portal if there are any changes to the plan described below, for example,if you are not contacted for the requested tests, referral(s) within one week, if you are unable to receive the medications prescribed, or if you feel you need to change the treatment course for any reason.     TESTING: Consider MRI Brain in the future if atypical migraine presentation     REFERRALS: none     PREVENTION (use daily regardless of headache):  - Continue Magnesium in ONE of the following  preparations -               1. Magnesium oxide 800mg daily (the most common over the counter kind, may causes loose stools)              2. Magnesium citrate 400-500mg daily (harder to find, but more neutral on the bowels)              3. Magnesium glycinate 400mg daily (hardest to find, look online, but most bowel-neutral, best absorbed)   - Increase Topamax to 75 mg twice daily  - Continue Emgality 28 days thereafter  - Consider Botox in the future     AS-NEEDED TREATMENT (use total no more than 10 days per month unless otherwise stated):  - Continue Imitrex 50 mg as needed. Ok to repeat dose 2 hours later. No more than 200 mg per 24 hours. Previously prescribed but has not tired yet   - Consider CGRP in the future  - Start Prednisone to help break current headache cycle     OTHER:   - Decrease daily diet coke per day   - Headache diary       Follow up in about 3 months (around 5/6/2025).       SEVERIANO ValentineC      I have spent 30 minutes of total time on the total encounter which includes face to face time and non-face to face time preparing to see the patient (eg, review of labs, previous encounters, care everywhere), obtaining and/or reviewing separately obtained history, documenting clinical information in the electronic or health record, independently interpreting results, and communicating results to the patient/family/caregiver, or care coordination.

## 2025-02-06 NOTE — PATIENT INSTRUCTIONS
Please call our clinic at [phone] or send a message on the [employer] portal if there are any changes to the plan described below, for example,if you are not contacted for the requested tests, referral(s) within one week, if you are unable to receive the medications prescribed, or if you feel you need to change the treatment course for any reason.     TESTING: Consider MRI Brain in the future if atypical migraine presentation     REFERRALS: none     PREVENTION (use daily regardless of headache):  - Continue Magnesium in ONE of the following preparations -               1. Magnesium oxide 800mg daily (the most common over the counter kind, may causes loose stools)              2. Magnesium citrate 400-500mg daily (harder to find, but more neutral on the bowels)              3. Magnesium glycinate 400mg daily (hardest to find, look online, but most bowel-neutral, best absorbed)   - Increase Topamax to 75 mg twice daily  - Continue Emgality 28 days thereafter  - Consider Botox in the future     AS-NEEDED TREATMENT (use total no more than 10 days per month unless otherwise stated):  - Continue Imitrex 50 mg as needed. Ok to repeat dose 2 hours later. No more than 200 mg per 24 hours. Previously prescribed but has not tired yet   - Consider CGRP in the future  - Start Prednisone to help break current headache cycle     OTHER:   - Decrease daily diet coke per day   - Headache diary

## 2025-04-14 ENCOUNTER — PATIENT MESSAGE (OUTPATIENT)
Dept: FAMILY MEDICINE | Facility: CLINIC | Age: 55
End: 2025-04-14
Payer: COMMERCIAL

## 2025-04-16 ENCOUNTER — OFFICE VISIT (OUTPATIENT)
Dept: NEUROLOGY | Facility: CLINIC | Age: 55
End: 2025-04-16
Payer: COMMERCIAL

## 2025-04-16 ENCOUNTER — TELEPHONE (OUTPATIENT)
Dept: NEUROLOGY | Facility: CLINIC | Age: 55
End: 2025-04-16

## 2025-04-16 DIAGNOSIS — M54.2 CERVICALGIA: ICD-10-CM

## 2025-04-16 DIAGNOSIS — G43.719 INTRACTABLE CHRONIC MIGRAINE WITHOUT AURA AND WITHOUT STATUS MIGRAINOSUS: Primary | ICD-10-CM

## 2025-04-16 RX ORDER — ZOLMITRIPTAN 5 MG/1
SPRAY NASAL
Qty: 6 EACH | Refills: 11 | Status: SHIPPED | OUTPATIENT
Start: 2025-04-16

## 2025-04-16 NOTE — PROGRESS NOTES
Date of service: 4/16/2025  Referring provider: No ref. provider found    Subjective:      Chief complaint: No chief complaint on file.       Patient ID: Siobhan Richards is a 54 y.o. .    History of Present Illness  INTERVAL HISTORY: 04/16/2025  The patient location is: home  The chief complaint leading to consultation is: follow up  Visit type: audiovisual  Face to Face time with patient: 20  30 minutes of total time spent on the encounter, which includes face to face time and non-face to face time preparing to see the patient (eg, review of tests), Obtaining and/or reviewing separately obtained history, Documenting clinical information in the electronic or other health record, Independently interpreting results (not separately reported) and communicating results to the patient/family/caregiver, or Care coordination (not separately reported).   Each patient to whom he or she provides medical services by telemedicine is:  (1) informed of the relationship between the physician and patient and the respective role of any other health care provider with respect to management of the patient; and (2) notified that he or she may decline to receive medical services by telemedicine and may withdraw from such care at any time.     Notes:   She presents today for follow up. At her last visit, Topamax was increased to 75 mg daily and continues Emgality, Magnesium and Imitrex as needed. Prednisone taper was somewhat effective but feels Imitrex was ineffective and has returned to using Sudafed and Excedrin. Today, she reports headaches 3-4 times per week which she feels is triggered by cervical and shoulder pain with concurrent pain radiating into her arm along with one more intense migraine per week. She does also report severe GI upset around the first of the month with concerns for Emgality side effect vs menstrual cycle as that is also typically at the same time. Otherwise information below is reviewed and verified  with no changes made.     INTERVAL HISTORY: 02/06/2025  She presents today for follow up. At her last visit, she was switched from Ajovy from Emgality, continues Topamax and Imitrex as needed. Today, she reports ongoing migraine for 5 days with fluctuation in intensity, now with 3/10. She did start Progesterone therapy 2 weeks ago. With this migraine she has had some sharp stabbing features associated with her migraine. She tried Imitrex once which was ineffective then used Sudafed and Excedrin. Otherwise information below is reviewed and verified with no changes made.    INTERVAL HISTORY: 01/03/2025  She presents today for migraine follow up. She continues Ajovy monthly, Topamax 50 mg BID and Imitrex as needed. She reports roughly 50% improvement in migraines. She has not used Imitrex yet and continues over the counter medication as needed. She struggles with Ajovy auto injector monthly due to mechanism of dispenser. Today, she reports roughly one migraine per week. Otherwise information below is reviewed and verified with no changes made.      ORIGINAL HEADACHE HISTORY - 10/03/2024  Age at onset and course over time: childhood. She was last seen 02/2022 by Dr. Rooney and lost to follow up. Since then, she has been managed by PCP. On average, she reports 2-3+ migraines per week. She did have an increase in migraines over the last month and was re-started on Topamax 50 mg daily by PCP which she feels has helped.   She also has migraine with aura (scintillating scotoma) with 5% of migraines. Otherwise information below is reviewed and verified with no changes made.      ---------------------------------  She was seen by Dr. Vergara (2019) and Dr. Rooney (2022) in the past.     History of Present Illness:               51Y RHF with chronic migraine, cervical spondylosis, HL, hiatal hernia who presents for initial evaluation of headache. Of note she had seen Dr. Vergara in 3/19 at which time she was diagnosed with chronic  "migraine and vestibular migraine and prescribed venlafaxine for prevention, for acute management rizatriptan po and sumatriptan sc. She mentions that venlafaxine did not work.  She is here to re establish care.                  She mentions that she has a neck positional headache which is more when she bends her neck to the right maybe or she wakes up from sleep, it's mainly neck pain and then from there progresses and into the right side , they can be triggered for 2 minutes of laying her head on 's shoulder, the neck ones typically progress into the nausea/vomiting. She will have light/sound sensitivity with it.                  The sinus headaches she describes as more fullness around the eyes, into the eyes,  frontally, top of head, variable locations, these tend to get moderate to severe. She has light/sound sensitivity as well, nausea/vomiting with these as well. She treats these with excedrin, mucinex, tylenol and improves it. She is not taking imitrex currently. She uses excedrin about 2 days/week only. She will use mucinex about 2 days a week. She tries to limit her OTC use.                  She is currently on TPX 50mg bid and feels it's helping her with the intensity/frequency and had been initially on it for weight loss. She did not ice that headaches were not as severe as they had been before. When she was off it headaches worsened.  She has been having more recently fullness on Rt ear and went to ENT where she was told it could be ETD vs. Vestibular migraine.      Headache history confirmed on today's visit:  Age at onset and course over time: headaches since childhood, worse with puberty, ovulation, and childbirth. She reported multiple headache types.   History from Dr. Vergara (prior headache specialist): "One is triggered by smell and is acute, unilateral, with nausea and vomiting, resolves after a couple of hours (very quick progression). 2nd is one that occurs upon waking, from occipital to " "eye, lastrs one day, photo and nausea and vomiting but not always. She associated this with a lipoma that she had removed. This occurs 2-3 times per month. Third headache is a "sinus headache" that occurs with weather changes, sometimes escalates to the 2nd type, this is the most frequent type. She was referred by ENT. She also has dizziness but this is intermittent. She feels off balance. Gets dizzy when looking up 9 (in [employer]), sec to minutes. No other types of dizziness."  She was diagnosed with chronic migraine and venlafaxine was prescribed which was ineffective. Currently she is on TPX 50 mg bid with improvement intensity/frequency. Feels either related to sinus or neck movements and continues to find she has different heaache types depending on trigger.     Location: orbital, occipital, cervical   Quality:  [x] Stabbing [x] Pressure [] Tight [x] Throbbing/pounding [x] Sharp    Duration: [] Seconds [] Minutes [x] Hours [x] Days [] Constant   Frequency: [] Daily [x] Weekly [] Monthly   How many days per month is your head or neck 100% pain free:   Headaches awaken at night?:   3  Worst time of day: upon waking   Intensity of pain: at best 1/10, at worst 10/10   Associated with: [x] Photophobia [x]  Phonophobia [x] Osmophobia [x] Loss of appetite [x] Nausea [x] Vomiting   [x] Dizziness [x] Vertigo [x] Ringing in the ears [x] Blurry vision [x] Double vision  [x] Anxiety/Anger/Irritability [x] Problems with concentration [x] Problems with memory [x] Problems with task completion   [x] Problems with relaxation [x] Neck tightness/ neck pain [x] Nasal congestion [x] Nasal or sinus pressure [x] Aura   Alleviated by:  [x] Sleep [x] Darkness [x] Local pressure [x] Massage [x] Heat [] Ice [] Menses [x] Medication  Exacerbated by:  [x] Fatigue [x] Light [x] Noise [x] Smells [x] Coughing [x] Sneezing  [x] Bending over [x] Change in weather [] Ovulation [] Menses [x] Alcohol [x] Stress [x]  Food  Ipsilateral autonomic: [] " nasal congestion [] lacrimation [] ptosis [] injection [] edema [] foreign body sensation [] ear fullness   ICP:  [] transient visual obscurations  [x] tinnitus - high pitched, steady, bilateral   [] positional headache  [] non-positional     Bowl Habits: [] Normal [] Constipation [] Diarrhea IBS   Caffeine intake: 3 Diet cokes per day   Sleep habits: good   Water intake: 60 oz per day    Eye Exam: up to date   Family history of migraine: mother, two sisters, 3 sons    Gyn status (if female) (birth control with estrogen, hysterectomy): menopausal   History of asthma, cancer, glaucoma, kidney stones, CVA and osteoporosis: none     HIT 6: 61    Current acute treatment:  Excedrin  Advil  Sudafed   Imitrex     Current prevention:  Topamax  Ajovy     Previously tried/failed acute treatment:  Tylenol  Aspirin  Motrin  Aleve   Maxalt   Relpax  Nurtec    Previously tried/failed preventative treatment:  Wellbutrin   Topamax- brain fog in the past   Effexor- ineffective     Considerations:   - Prednisone taper somewhat effective     Review of patient's allergies indicates:  No Known Allergies  Current Outpatient Medications   Medication Sig Dispense Refill    albuterol (VENTOLIN HFA) 90 mcg/actuation inhaler Inhale 2 puffs into the lungs every 6 (six) hours as needed for Wheezing. Rescue 18 g 0    aspirin-acetaminophen-caffeine 250-250-65 mg (EXCEDRIN MIGRAINE) 250-250-65 mg per tablet       colesevelam (WELCHOL) 625 mg tablet Take 1 tablet (625 mg total) by mouth 2 (two) times daily with meals. Diarrhea (Patient not taking: Reported on 4/16/2024) 60 tablet 1    dextroamphetamine-amphetamine (ADDERALL) 20 mg tablet Take 1 tablet by mouth 2 (two) times a day. # 2 60 tablet 0    dicyclomine (BENTYL) 10 MG capsule Take 1 capsule (10 mg total) by mouth 4 (four) times daily. Prn stomach cramps (Patient not taking: Reported on 2/6/2025) 30 capsule 0    ergocalciferol (ERGOCALCIFEROL) 50,000 unit Cap Take 1 capsule (50,000 Units  total) by mouth every 7 days. 12 capsule 2    esomeprazole (NEXIUM) 40 MG capsule TAKE 1 CAPSULE BY MOUTH BEFORE BREAKFAST. 30 capsule 11    fremanezumab-vfrm (AJOVY AUTOINJECTOR) 225 mg/1.5 mL autoinjector Inject 1.5 mLs (225 mg total) into the skin every 28 days. 1 each 11    furosemide (LASIX) 20 MG tablet TAKE 1 TABLET ONCE A DAY AS NEEDED SWELLING (Patient not taking: Reported on 9/9/2024)  12    galcanezumab-gnlm 120 mg/mL PnIj Inject 240 mg (2 injections) subcutaneous at separate sites, once (loading dose). Start maintenance dose 28 days later (Patient not taking: Reported on 2/6/2025) 2 mL 0    galcanezumab-gnlm 120 mg/mL PnIj Inject 240 mg (2 injections) subcutaneous at separate sites, once (loading dose). Start maintenance dose 28 days later 2 mL 0    galcanezumab-gnlm 120 mg/mL Syrg Inject 120 mg into the skin every 28 days. 1 mL 11    lisdexamfetamine (VYVANSE) 20 MG capsule Take 1 capsule (20 mg total) by mouth every morning. #2 (Patient not taking: Reported on 9/9/2024) 30 capsule 0    multivitamin (DAILY VITAMIN ORAL)       nystatin-triamcinolone (MYCOLOG) ointment Apply topically 2 (two) times daily. 30 g 0    ondansetron (ZOFRAN-ODT) 4 MG TbDL Take 1 tablet (4 mg total) by mouth every 8 (eight) hours as needed (nausea). 30 tablet 3    oxybutynin (DITROPAN-XL) 5 MG TR24 Take 1 tablet (5 mg total) by mouth once daily. Bladder (Patient not taking: Reported on 7/22/2024) 90 tablet 2    pantoprazole (PROTONIX) 40 MG tablet Take 40 mg by mouth once daily.      pravastatin (PRAVACHOL) 40 MG tablet Take 1 tablet (40 mg total) by mouth once daily. 90 tablet 2    predniSONE (DELTASONE) 10 MG tablet 4 tab PO in AM x 2 days, 3 tab in AM x2 days, 2 tab in AM x 2 days, 1 tab in AM x 2 days then stop 20 tablet 0    promethazine (PHENERGAN) 25 MG tablet Take 1 tablet (25 mg total) by mouth every 6 (six) hours as needed for Nausea. 30 tablet 0    tirzepatide, weight loss, (ZEPBOUND) 2.5 mg/0.5 mL PnIj Inject 2.5 mg  into the skin every 7 days. 4 Pen 0    topiramate (TOPAMAX) 25 MG tablet Take with 50 mg twice daily 60 tablet 6    topiramate (TOPAMAX) 50 MG tablet TAKE 1 TABLET BY MOUTH EVERY DAY 90 tablet 1    ZOLMitriptan (ZOMIG) 5 mg Spry 1 spray in one nostril PRN migraine. May repeat once in 2 hours. No more than 2 days/week. 6 each 11     Current Facility-Administered Medications   Medication Dose Route Frequency Provider Last Rate Last Admin    [START ON 2025] onabotulinumtoxina injection 200 Units  200 Units Intramuscular Q90 Days            Past Medical History  Past Medical History:   Diagnosis Date    Allergy     Arthritis     COVID-19 virus infection     2021    Dizziness     Food allergy     Food: banana, cholocate, milk, nuts (skin testing)    GERD (gastroesophageal reflux disease)     Headache(784.0)     Hiatal hernia     Hyperlipidemia     Obesity     Personal history of colonic polyps 2024    Julian Robledo MD    PONV (postoperative nausea and vomiting)     Snoring        Past Surgical History  Past Surgical History:   Procedure Laterality Date    BREAST BIOPSY Right     core bx.    BREAST BIOPSY Right     removal of duct    BREAST SURGERY   (?)    Rt duct     SECTION      CHOLECYSTECTOMY      COLONOSCOPY  2024    Julian Robledo MD    FIXATION OF SYNDESMOSIS OF ANKLE Right 2019    Procedure: FIXATION, SYNDESMOSIS, ANKLE;  Surgeon: Fuentes Devries MD;  Location: [facility] OR;  Service: Orthopedics;  Laterality: Right;    FRACTURE SURGERY  2019    Rt ankle    LIPOMA RESECTION Left 10/24/2018    Procedure: EXCISION, LIPOMA - Left Scapula;  Surgeon: Smith Kenyon MD;  Location: [facility] OR;  Service: General;  Laterality: Left;    OPEN REDUCTION AND INTERNAL FIXATION (ORIF) OF INJURY OF ANKLE Right 2019    Procedure: ORIF, ANKLE;  Surgeon: Fuentes Devries MD;  Location: [facility] OR;  Service: Orthopedics;  Laterality: Right;       Family History  Family  History   Problem Relation Name Age of Onset    Heart disease Mother stents     Lung cancer Father Lung cancer     Goiter Father Lung cancer     Cancer Father Lung cancer     Thyroid cancer Sister Thyroid cancer     Cancer Sister Thyroid cancer     Heart attack Brother      Cancer Paternal Aunt Pancreatic-         Pancreatic    Cancer Maternal Uncle Colon         Colon    Thyroid nodules Sister         Social History  Social History     Socioeconomic History    Marital status:     Number of children: 3   Tobacco Use    Smoking status: Former     Current packs/day: 0.00     Types: Cigarettes     Passive exposure: Past    Smokeless tobacco: Never   Substance and Sexual Activity    Alcohol use: No    Drug use: No     Social Drivers of Health     Financial Resource Strain: Low Risk  (2025)    Overall Financial Resource Strain (CARDIA)     Difficulty of Paying Living Expenses: Not hard at all   Food Insecurity: No Food Insecurity (2025)    Hunger Vital Sign     Worried About Running Out of Food in the Last Year: Never true     Ran Out of Food in the Last Year: Never true   Transportation Needs: No Transportation Needs (2024)    PRAPARE - Transportation     Lack of Transportation (Medical): No     Lack of Transportation (Non-Medical): No   Physical Activity: Insufficiently Active (2025)    Exercise Vital Sign     Days of Exercise per Week: 1 day     Minutes of Exercise per Session: 10 min   Stress: No Stress Concern Present (2025)    [employer] - Occupational Stress Questionnaire     Feeling of Stress : Only a little   Housing Stability: Low Risk  (2024)    Housing Stability Vital Sign     Unable to Pay for Housing in the Last Year: No     Number of Places Lived in the Last Year: 1     Unstable Housing in the Last Year: No        Review of Systems  14-point review of systems as follows:   No check darren indicates NEGATIVE response   Constitutional: []  weight loss [] change to appetite   Eyes: [] change in vision [] double vision   Ears, nose, mouth, throat: [] frequent nose bleeds [] ringing in the ears   Respiratory: [] cough [] wheezing   Cardiovascular: [] chest pain [] palpitations   Gastrointestinal: [] jaundice [] nausea/vomiting   Genitourinary: [] incontinence [] burning with urination   Hematologic/lymphatic: [] easy bruising/bleeding [] night sweats   Neurological: [] numbness [] weakness   Endocrine: [] fatigue [] heat/cold intolerance   Allergy/Immunologic: [] fevers [] chills   Musculoskeletal: [] muscle pain [] joint pain   Psychiatric: [] thoughts of harming self/others [] depression   Integumentary: [] rashes [] sores that do not heal     Objective:        There were no vitals filed for this visit.      There is no height or weight on file to calculate BMI.    General exam:  Alert, cooperative, not in distress  Normocephalic and atraumatic  Pink conjunctiva, anicteric sclera, moist mucous membranes  No cervical lymphadenopathy   No joint swelling or tenderness    Data Review:     I have personally reviewed the referring provider's notes, labs, & imaging made available to me today.      RADIOLOGY STUDIES:  I have personally reviewed the pertinent images performed.       No results found for this or any previous visit.    Lab Results   Component Value Date     10/30/2024    K 4.3 10/30/2024     10/30/2024    CO2 24 10/30/2024    BUN 17 10/30/2024    CREATININE 0.9 10/30/2024    GLU 96 10/30/2024    HGBA1C 5.0 12/28/2023    AST 14 10/30/2024    ALT 17 10/30/2024    ALBUMIN 4.2 10/30/2024    PROT 7.9 10/30/2024    BILITOT 0.3 10/30/2024    CHOL 193 10/30/2024    HDL 48 10/30/2024    LDLCALC 123.8 10/30/2024    TRIG 106 10/30/2024       Lab Results   Component Value Date    WBC 9.10 10/30/2024    HGB 14.8 10/30/2024    HCT 42.5 10/30/2024    MCV 91 10/30/2024     10/30/2024       Lab Results   Component Value Date    TSH 1.831  10/30/2024           Assessment & Plan:       Problem List Items Addressed This Visit       Intractable chronic migraine without aura and without status migrainosus - Primary    Overview   Strong family history of migraine onset in childhood with expected flares during typical hormonal milestones. Gradually progressive to chronic migraine over time. Very typical symptoms of weather sensitivity, osmophobia, nausea, light and noise sensitivity.      Headaches are typically unilateral, moderate to severe in intensity, worsen with activity, pounding in quality and associated with sensitivity to light and sound.     Gradual progression pattern, lack of red flag features on history, and normal neurological exam are reassuring for primary as opposed to secondary etiology of headaches thus imaging will not be pursued for this history and this exam at this time.    Continue Topamax 75 mg twice daily and Magnesium nightly. Continue Emgality 28 days thereafter - discussed adjusting injection date to second week of the month to better differentiate GI upset from medication vs menstrual cycle. Start Botox every 3 months - first appointment in 6 weeks as she had cosmetic Botox one month ago and has concerns for ptosis.Discussed increasing Imitrex to 100 mg - defers. Start Zomig nasal spray as needed for escalations. Decrease daily diet coke per day. Headache diary.          Relevant Medications    ZOLMitriptan (ZOMIG) 5 mg Spry    onabotulinumtoxina injection 200 Units (Start on 5/7/2025 12:00 AM)    Other Relevant Orders    Prior authorization Order    Cervicalgia    Overview   Left more than right axial neck pain especially overnight. I think bulk of her neck pain will improve if her migraines are better controlled but we may need to consider trigger points or CMBB in the future         Relevant Orders    Ambulatory referral/consult to Physical/Occupational Therapy               Please call our clinic at [phone] or send a  message on the [employer] portal if there are any changes to the plan described below, for example,if you are not contacted for the requested tests, referral(s) within one week, if you are unable to receive the medications prescribed, or if you feel you need to change the treatment course for any reason.     TESTING: Consider MRI Brain in the future if atypical migraine presentation     REFERRALS: Physical therapy for cervical pain as contributor to headaches     PREVENTION (use daily regardless of headache):  - Continue Magnesium in ONE of the following preparations -               1. Magnesium oxide 800mg daily (the most common over the counter kind, may causes loose stools)              2. Magnesium citrate 400-500mg daily (harder to find, but more neutral on the bowels)              3. Magnesium glycinate 400mg daily (hardest to find, look online, but most bowel-neutral, best absorbed)   - Continue Topamax 75 mg twice daily  - Continue Emgality 28 days thereafter - discussed adjusting injection date to second week of the month to better differentiate GI upset from medication vs menstrual cycle   - Start Botox every 3 months - first appointment in 6 weeks as she had cosmetic Botox one month ago and has concerns for ptosis     AS-NEEDED TREATMENT (use total no more than 10 days per month unless otherwise stated):  - Discussed increasing Imitrex to 100 mg - defers  - Start Zomig nasal spray as needed for escalations   - Consider CGRP in the future     OTHER:   - Decrease daily diet coke per day   - Headache diary       Follow up in about 6 weeks (around 5/28/2025) for Botox .       SEVERIANO ValentineC      I have spent 30 minutes of total time on the total encounter which includes face to face time and non-face to face time preparing to see the patient (eg, review of labs, previous encounters, care everywhere), obtaining and/or reviewing separately obtained history, documenting clinical information in the electronic or  health record, independently interpreting results, and communicating results to the patient/family/caregiver, or care coordination.

## 2025-04-16 NOTE — PATIENT INSTRUCTIONS
Please call our clinic at [phone] or send a message on the [employer] portal if there are any changes to the plan described below, for example,if you are not contacted for the requested tests, referral(s) within one week, if you are unable to receive the medications prescribed, or if you feel you need to change the treatment course for any reason.     TESTING: Consider MRI Brain in the future if atypical migraine presentation     REFERRALS: Physical therapy for cervical pain as contributor to headaches     PREVENTION (use daily regardless of headache):  - Continue Magnesium in ONE of the following preparations -               1. Magnesium oxide 800mg daily (the most common over the counter kind, may causes loose stools)              2. Magnesium citrate 400-500mg daily (harder to find, but more neutral on the bowels)              3. Magnesium glycinate 400mg daily (hardest to find, look online, but most bowel-neutral, best absorbed)   - Continue Topamax 75 mg twice daily  - Continue Emgality 28 days thereafter - discussed adjusting injection date to second week of the month to better differentiate GI upset from medication vs menstrual cycle   - Start Botox every 3 months - first appointment in 6 weeks as she had cosmetic Botox one month ago and has concerns for ptosis     AS-NEEDED TREATMENT (use total no more than 10 days per month unless otherwise stated):  - Discussed increasing Imitrex to 100 mg - defers  - Start Zomig nasal spray as needed for escalations   - Consider CGRP in the future     OTHER:   - Decrease daily diet coke per day   - Headache diary

## 2025-04-17 ENCOUNTER — TELEPHONE (OUTPATIENT)
Dept: NEUROLOGY | Facility: CLINIC | Age: 55
End: 2025-04-17
Payer: COMMERCIAL

## 2025-04-21 ENCOUNTER — RESULTS FOLLOW-UP (OUTPATIENT)
Dept: FAMILY MEDICINE | Facility: CLINIC | Age: 55
End: 2025-04-21

## 2025-04-21 ENCOUNTER — HOSPITAL ENCOUNTER (OUTPATIENT)
Dept: RADIOLOGY | Facility: HOSPITAL | Age: 55
Discharge: HOME | End: 2025-04-21
Attending: NURSE PRACTITIONER
Payer: COMMERCIAL

## 2025-04-21 ENCOUNTER — OFFICE VISIT (OUTPATIENT)
Dept: FAMILY MEDICINE | Facility: CLINIC | Age: 55
End: 2025-04-21
Payer: COMMERCIAL

## 2025-04-21 VITALS
SYSTOLIC BLOOD PRESSURE: 110 MMHG | OXYGEN SATURATION: 98 % | HEIGHT: 67 IN | HEART RATE: 78 BPM | BODY MASS INDEX: 32.17 KG/M2 | WEIGHT: 204.94 LBS | DIASTOLIC BLOOD PRESSURE: 80 MMHG

## 2025-04-21 DIAGNOSIS — M25.531 BILATERAL WRIST PAIN: ICD-10-CM

## 2025-04-21 DIAGNOSIS — M25.512 ACUTE PAIN OF LEFT SHOULDER: ICD-10-CM

## 2025-04-21 DIAGNOSIS — S46.912A STRAIN OF LEFT SHOULDER, INITIAL ENCOUNTER: Primary | ICD-10-CM

## 2025-04-21 DIAGNOSIS — M25.532 BILATERAL WRIST PAIN: ICD-10-CM

## 2025-04-21 PROCEDURE — 1159F MED LIST DOCD IN RCRD: CPT | Mod: CPTII,S$GLB,, | Performed by: NURSE PRACTITIONER

## 2025-04-21 PROCEDURE — 3074F SYST BP LT 130 MM HG: CPT | Mod: CPTII,S$GLB,, | Performed by: NURSE PRACTITIONER

## 2025-04-21 PROCEDURE — 73030 X-RAY EXAM OF SHOULDER: CPT | Mod: TC,PN,LT

## 2025-04-21 PROCEDURE — 99999 PR PBB SHADOW E&M-EST. PATIENT-LVL V: CPT | Mod: PBBFAC,,, | Performed by: NURSE PRACTITIONER

## 2025-04-21 PROCEDURE — 73030 X-RAY EXAM OF SHOULDER: CPT | Mod: 26,LT,, | Performed by: RADIOLOGY

## 2025-04-21 PROCEDURE — 99214 OFFICE O/P EST MOD 30 MIN: CPT | Mod: S$GLB,,, | Performed by: NURSE PRACTITIONER

## 2025-04-21 PROCEDURE — 3008F BODY MASS INDEX DOCD: CPT | Mod: CPTII,S$GLB,, | Performed by: NURSE PRACTITIONER

## 2025-04-21 PROCEDURE — 1160F RVW MEDS BY RX/DR IN RCRD: CPT | Mod: CPTII,S$GLB,, | Performed by: NURSE PRACTITIONER

## 2025-04-21 PROCEDURE — 3079F DIAST BP 80-89 MM HG: CPT | Mod: CPTII,S$GLB,, | Performed by: NURSE PRACTITIONER

## 2025-04-21 RX ORDER — ESTRADIOL 0.07 MG/D
1 FILM, EXTENDED RELEASE TRANSDERMAL
COMMUNITY
Start: 2025-04-06

## 2025-04-21 RX ORDER — METHOCARBAMOL 500 MG/1
500 TABLET, FILM COATED ORAL 4 TIMES DAILY
Qty: 40 TABLET | Refills: 0 | Status: SHIPPED | OUTPATIENT
Start: 2025-04-21 | End: 2025-05-01

## 2025-04-21 RX ORDER — ESTRADIOL 0.1 MG/G
CREAM VAGINAL
COMMUNITY
Start: 2024-11-09

## 2025-04-21 RX ORDER — ESTRADIOL 10 UG/1
1 TABLET VAGINAL
COMMUNITY
Start: 2024-12-09

## 2025-04-21 RX ORDER — PROGESTERONE 100 MG/1
100 CAPSULE ORAL
COMMUNITY
Start: 2024-12-09

## 2025-04-21 NOTE — PROGRESS NOTES
THIS DOCUMENT WAS MADE IN PART WITH VOICE RECOGNITION SOFTWARE.  OCCASIONALLY THIS SOFTWARE WILL MISINTERPRET WORDS OR PHRASES.     Assessment and Plan:    Strain of left shoulder, initial encounter  -     methocarbamoL (ROBAXIN) 500 MG Tab; Take 1 tablet (500 mg total) by mouth 4 (four) times daily. for 10 days  Dispense: 40 tablet; Refill: 0    Acute pain of left shoulder  -     X-Ray Shoulder 2 or More Views Left; Future; Expected date: 04/21/2025  -     methocarbamoL (ROBAXIN) 500 MG Tab; Take 1 tablet (500 mg total) by mouth 4 (four) times daily. for 10 days  Dispense: 40 tablet; Refill: 0    Bilateral wrist pain             Visit summary:    Evaluated left shoulder pain- suspect strain.  Reported symptoms seem to be resolving.   Evaluated bilateral wrist pain. Suspect carpal tunnel syndrome based on reported wrist symptoms and  difficulties.  Consider referral to ortho if persistent.       PLAN SUMMARY:  - XR Left Shoulder ordered- reviewed- mild degenerative changes noted, otherwise normal.   - Started Robaxin (methocarbamol) 500mg 1 tablet up to 4 times daily  - advised on symptomatic treatment with stretches, warm or cold compresses, OTC ibuprofen and Tylenol as needed.  - advised on wearing wrist splints- consistently at night.  - Consider referral to ortho if wrist pain persists.  - Consider referral to PT if shoulder pain does not continue to improve over the next 2 weeks.         Follow up in 4 weeks (on 5/19/2025) for Follow-up with PCP.   ______________________________________________________________________  Subjective:    History of Present Illness    CHIEF COMPLAINT:  - Patient presents with left shoulder pain and discomfort, particularly bothersome at night and affecting sleep.    HPI:  Patient reports left shoulder pain that started approximately 1 week ago. The pain is primarily located in the front of the shoulder and sometimes radiates down the arm. She describes the pain as uncomfortable  rather than sharp, characterizing it as a sore feeling. The discomfort is most pronounced at night, interfering with sleep, especially when lying on the affected side. During the day, she can move the arm and lift things, but has discomfort when extending the arm outward. The area around the shoulder becomes more painful when sitting in certain positions.    She reports intermittent wrist pain in both hands for a couple months, describing it as a  ache with difficulty gripping objects and aching in all knuckles. The thumb is particularly affected, with  numbness reported. The wrist pain is variable, sometimes affecting one side more than the other on different days. She has tried wearing wrist braces at home, which helps keep the wrists steady.    She also mentions suffering from migraines and neck pain, which she speculates could be related to the shoulder discomfort. She has not taken any specific medication for the shoulder pain, noting that the discomfort is not constant and seemed to be improving until the night before the visit when sleep was again disturbed.    She suggests she might have strained the shoulder.    She denies popping sounds in the shoulder and any specific injury to the shoulder or wrists.         Medications:  Medications Ordered Prior to Encounter[1]    Review of Systems:  Review of Systems   Constitutional:  Negative for activity change and unexpected weight change.   HENT:  Negative for hearing loss, rhinorrhea and trouble swallowing.    Eyes:  Negative for discharge and visual disturbance.   Respiratory:  Negative for chest tightness and wheezing.    Cardiovascular:  Negative for chest pain and palpitations.   Gastrointestinal:  Negative for blood in stool, constipation, diarrhea and vomiting.   Endocrine: Negative for polydipsia and polyuria.   Genitourinary:  Negative for difficulty urinating, dysuria, hematuria and menstrual problem.   Musculoskeletal:  Positive for arthralgias, joint  "swelling and neck pain.   Neurological:  Positive for headaches. Negative for weakness.   Psychiatric/Behavioral:  Negative for confusion and dysphoric mood.        Past Medical History:  Past Medical History:   Diagnosis Date    Allergy     Arthritis     COVID-19 virus infection     12/2021    Dizziness     Food allergy     Food: banana, cholocate, milk, nuts (skin testing)    GERD (gastroesophageal reflux disease)     Headache(784.0)     Hiatal hernia     Hyperlipidemia     Obesity     Personal history of colonic polyps 06/28/2024    Julian Robledo MD    PONV (postoperative nausea and vomiting)     Snoring        Objective:    Vitals:  Vitals:    04/21/25 0755   BP: 110/80   Pulse: 78   SpO2: 98%   Weight: 92.9 kg (204 lb 14.7 oz)   Height: 5' 7" (1.702 m)   PainSc:   6       Physical Exam  Vitals and nursing note reviewed.   Constitutional:       General: She is not in acute distress.  HENT:      Head: Normocephalic and atraumatic.   Eyes:      General: No scleral icterus.     Conjunctiva/sclera: Conjunctivae normal.   Cardiovascular:      Rate and Rhythm: Normal rate and regular rhythm.   Pulmonary:      Effort: Pulmonary effort is normal. No respiratory distress.      Breath sounds: Normal breath sounds.   Musculoskeletal:      Left shoulder: Tenderness (Anterior lateral aspect) present. No bony tenderness or crepitus. Normal range of motion.      Right wrist: No swelling, bony tenderness or snuff box tenderness. Normal range of motion.      Left wrist: No swelling, bony tenderness or snuff box tenderness. Normal range of motion.        Arms:       Right lower leg: No edema.      Left lower leg: No edema.   Skin:     General: Skin is warm and dry.   Neurological:      General: No focal deficit present.      Mental Status: She is alert and oriented to person, place, and time.   Psychiatric:         Mood and Affect: Mood normal.         Behavior: Behavior normal.         Thought Content: Thought content " normal.         Data:    CMP  Sodium   Date Value Ref Range Status   10/30/2024 141 136 - 145 mmol/L Final     Potassium   Date Value Ref Range Status   10/30/2024 4.3 3.5 - 5.1 mmol/L Final     Chloride   Date Value Ref Range Status   10/30/2024 107 95 - 110 mmol/L Final     CO2   Date Value Ref Range Status   10/30/2024 24 23 - 29 mmol/L Final     Glucose   Date Value Ref Range Status   10/30/2024 96 70 - 110 mg/dL Final     BUN   Date Value Ref Range Status   10/30/2024 17 6 - 20 mg/dL Final     Creatinine   Date Value Ref Range Status   10/30/2024 0.9 0.5 - 1.4 mg/dL Final     Calcium   Date Value Ref Range Status   10/30/2024 9.9 8.7 - 10.5 mg/dL Final     Total Protein   Date Value Ref Range Status   10/30/2024 7.9 6.0 - 8.4 g/dL Final     Albumin   Date Value Ref Range Status   10/30/2024 4.2 3.5 - 5.2 g/dL Final     Total Bilirubin   Date Value Ref Range Status   10/30/2024 0.3 0.1 - 1.0 mg/dL Final     Comment:     For infants and newborns, interpretation of results should be based  on gestational age, weight and in agreement with clinical  observations.    Premature Infant recommended reference ranges:  Up to 24 hours.............<8.0 mg/dL  Up to 48 hours............<12.0 mg/dL  3-5 days..................<15.0 mg/dL  6-29 days.................<15.0 mg/dL       Alkaline Phosphatase   Date Value Ref Range Status   10/30/2024 59 40 - 150 U/L Final     AST   Date Value Ref Range Status   10/30/2024 14 10 - 40 U/L Final     ALT   Date Value Ref Range Status   10/30/2024 17 10 - 44 U/L Final     Anion Gap   Date Value Ref Range Status   10/30/2024 10 8 - 16 mmol/L Final     eGFR   Date Value Ref Range Status   10/30/2024 >60.0 >60 mL/min/1.73 m^2 Final        Medical history reviewed, Medications reconciled.      I spent a total of 30 minutes on the day of the visit.This includes face to face time and non-face to face time preparing to see the patient (eg, review of tests), obtaining and/or reviewing separately  obtained history, documenting clinical information in the electronic or other health record, independently interpreting results and communicating results to the patient/family/caregiver, or care coordinator.     ARIELLE Hoff-KARON  Family Medicine           [1]   Current Outpatient Medications on File Prior to Visit   Medication Sig Dispense Refill    aspirin-acetaminophen-caffeine 250-250-65 mg (EXCEDRIN MIGRAINE) 250-250-65 mg per tablet       dextroamphetamine-amphetamine (ADDERALL) 20 mg tablet Take 1 tablet by mouth 2 (two) times a day. # 2 60 tablet 0    ergocalciferol (ERGOCALCIFEROL) 50,000 unit Cap Take 1 capsule (50,000 Units total) by mouth every 7 days. 12 capsule 2    estradioL (ESTRACE) 0.01 % (0.1 mg/gram) vaginal cream SMARTSI Gram(s) Vaginal      estradioL (VIVELLE-DOT) 0.075 mg/24 hr Place 1 patch onto the skin twice a week.      fremanezumab-vfrm (AJOVY AUTOINJECTOR) 225 mg/1.5 mL autoinjector Inject 1.5 mLs (225 mg total) into the skin every 28 days. 1 each 11    galcanezumab-gnlm 120 mg/mL PnIj Inject 240 mg (2 injections) subcutaneous at separate sites, once (loading dose). Start maintenance dose 28 days later 2 mL 0    galcanezumab-gnlm 120 mg/mL Syrg Inject 120 mg into the skin every 28 days. 1 mL 11    multivitamin (DAILY VITAMIN ORAL)       ondansetron (ZOFRAN-ODT) 4 MG TbDL Take 1 tablet (4 mg total) by mouth every 8 (eight) hours as needed (nausea). 30 tablet 3    pantoprazole (PROTONIX) 40 MG tablet Take 40 mg by mouth once daily.      pravastatin (PRAVACHOL) 40 MG tablet Take 1 tablet (40 mg total) by mouth once daily. 90 tablet 2    progesterone (PROMETRIUM) 100 MG capsule Take 100 mg by mouth.      topiramate (TOPAMAX) 25 MG tablet Take with 50 mg twice daily 60 tablet 6    topiramate (TOPAMAX) 50 MG tablet TAKE 1 TABLET BY MOUTH EVERY DAY 90 tablet 1    YUVAFEM 10 mcg Tab Place 1 tablet vaginally twice a week.      albuterol (VENTOLIN HFA) 90 mcg/actuation inhaler Inhale 2  puffs into the lungs every 6 (six) hours as needed for Wheezing. Rescue (Patient not taking: Reported on 4/21/2025) 18 g 0    colesevelam (WELCHOL) 625 mg tablet Take 1 tablet (625 mg total) by mouth 2 (two) times daily with meals. Diarrhea (Patient not taking: Reported on 4/21/2025) 60 tablet 1    dicyclomine (BENTYL) 10 MG capsule Take 1 capsule (10 mg total) by mouth 4 (four) times daily. Prn stomach cramps (Patient not taking: Reported on 1/3/2025) 30 capsule 0    esomeprazole (NEXIUM) 40 MG capsule TAKE 1 CAPSULE BY MOUTH BEFORE BREAKFAST. (Patient not taking: Reported on 4/21/2025) 30 capsule 11    furosemide (LASIX) 20 MG tablet TAKE 1 TABLET ONCE A DAY AS NEEDED SWELLING (Patient not taking: Reported on 4/21/2025)  12    galcanezumab-gnlm 120 mg/mL PnIj Inject 240 mg (2 injections) subcutaneous at separate sites, once (loading dose). Start maintenance dose 28 days later (Patient not taking: Reported on 1/3/2025) 2 mL 0    lisdexamfetamine (VYVANSE) 20 MG capsule Take 1 capsule (20 mg total) by mouth every morning. #2 (Patient not taking: Reported on 4/21/2025) 30 capsule 0    nystatin-triamcinolone (MYCOLOG) ointment Apply topically 2 (two) times daily. (Patient not taking: Reported on 4/21/2025) 30 g 0    oxybutynin (DITROPAN-XL) 5 MG TR24 Take 1 tablet (5 mg total) by mouth once daily. Bladder (Patient not taking: Reported on 4/21/2025) 90 tablet 2    predniSONE (DELTASONE) 10 MG tablet 4 tab PO in AM x 2 days, 3 tab in AM x2 days, 2 tab in AM x 2 days, 1 tab in AM x 2 days then stop (Patient not taking: Reported on 4/21/2025) 20 tablet 0    promethazine (PHENERGAN) 25 MG tablet Take 1 tablet (25 mg total) by mouth every 6 (six) hours as needed for Nausea. (Patient not taking: Reported on 4/21/2025) 30 tablet 0    tirzepatide, weight loss, (ZEPBOUND) 2.5 mg/0.5 mL PnIj Inject 2.5 mg into the skin every 7 days. (Patient not taking: Reported on 4/21/2025) 4 Pen 0    ZOLMitriptan (ZOMIG) 5 mg Spry 1 spray in  one nostril PRN migraine. May repeat once in 2 hours. No more than 2 days/week. (Patient not taking: Reported on 4/21/2025) 6 each 11     Current Facility-Administered Medications on File Prior to Visit   Medication Dose Route Frequency Provider Last Rate Last Admin    [START ON 5/7/2025] onabotulinumtoxina injection 200 Units  200 Units Intramuscular Q90 Days

## 2025-04-27 ENCOUNTER — PATIENT MESSAGE (OUTPATIENT)
Dept: FAMILY MEDICINE | Facility: CLINIC | Age: 55
End: 2025-04-27
Payer: COMMERCIAL

## 2025-07-01 ENCOUNTER — PATIENT MESSAGE (OUTPATIENT)
Dept: NEUROLOGY | Facility: CLINIC | Age: 55
End: 2025-07-01
Payer: COMMERCIAL

## 2025-07-01 ENCOUNTER — PATIENT MESSAGE (OUTPATIENT)
Dept: FAMILY MEDICINE | Facility: CLINIC | Age: 55
End: 2025-07-01
Payer: COMMERCIAL

## 2025-07-10 ENCOUNTER — PATIENT MESSAGE (OUTPATIENT)
Dept: FAMILY MEDICINE | Facility: CLINIC | Age: 55
End: 2025-07-10
Payer: COMMERCIAL

## 2025-07-10 NOTE — TELEPHONE ENCOUNTER
Spoke w/ patient she had scheduled herself via patient portal for annual as virtual visit on 8/14/2025 7:40am, patient needs to change appt to in person.

## 2025-07-16 ENCOUNTER — PATIENT MESSAGE (OUTPATIENT)
Dept: FAMILY MEDICINE | Facility: CLINIC | Age: 55
End: 2025-07-16
Payer: COMMERCIAL

## 2025-07-24 ENCOUNTER — HOSPITAL ENCOUNTER (OUTPATIENT)
Dept: RADIOLOGY | Facility: HOSPITAL | Age: 55
Discharge: HOME | End: 2025-07-24
Attending: INTERNAL MEDICINE
Payer: COMMERCIAL

## 2025-07-24 ENCOUNTER — OFFICE VISIT (OUTPATIENT)
Dept: FAMILY MEDICINE | Facility: CLINIC | Age: 55
End: 2025-07-24
Payer: COMMERCIAL

## 2025-07-24 VITALS
HEART RATE: 78 BPM | SYSTOLIC BLOOD PRESSURE: 116 MMHG | BODY MASS INDEX: 31.3 KG/M2 | TEMPERATURE: 98 F | RESPIRATION RATE: 16 BRPM | HEIGHT: 67 IN | WEIGHT: 199.44 LBS | DIASTOLIC BLOOD PRESSURE: 85 MMHG | OXYGEN SATURATION: 97 %

## 2025-07-24 DIAGNOSIS — K21.00 GASTROESOPHAGEAL REFLUX DISEASE WITH ESOPHAGITIS, UNSPECIFIED WHETHER HEMORRHAGE: ICD-10-CM

## 2025-07-24 DIAGNOSIS — M79.642 PAIN IN BOTH HANDS: ICD-10-CM

## 2025-07-24 DIAGNOSIS — Z13.6 ENCOUNTER FOR SCREENING FOR CARDIOVASCULAR DISORDERS: ICD-10-CM

## 2025-07-24 DIAGNOSIS — M79.646 THUMB PAIN, UNSPECIFIED LATERALITY: ICD-10-CM

## 2025-07-24 DIAGNOSIS — E55.9 VITAMIN D DEFICIENCY: ICD-10-CM

## 2025-07-24 DIAGNOSIS — D80.3 IGG3 SUBCLASS DEFICIENCY: ICD-10-CM

## 2025-07-24 DIAGNOSIS — M79.641 PAIN IN BOTH HANDS: ICD-10-CM

## 2025-07-24 DIAGNOSIS — R04.0 BLEEDING NOSE: ICD-10-CM

## 2025-07-24 DIAGNOSIS — F41.9 ANXIETY: ICD-10-CM

## 2025-07-24 DIAGNOSIS — E78.2 MIXED HYPERLIPIDEMIA: ICD-10-CM

## 2025-07-24 DIAGNOSIS — E78.41 ELEVATED LIPOPROTEIN(A): ICD-10-CM

## 2025-07-24 DIAGNOSIS — J30.89 ENVIRONMENTAL AND SEASONAL ALLERGIES: ICD-10-CM

## 2025-07-24 DIAGNOSIS — Z00.00 WELLNESS EXAMINATION: Primary | ICD-10-CM

## 2025-07-24 DIAGNOSIS — F98.8 ADD (ATTENTION DEFICIT DISORDER) WITHOUT HYPERACTIVITY: ICD-10-CM

## 2025-07-24 DIAGNOSIS — E66.811 CLASS 1 OBESITY WITH SERIOUS COMORBIDITY AND BODY MASS INDEX (BMI) OF 31.0 TO 31.9 IN ADULT, UNSPECIFIED OBESITY TYPE: ICD-10-CM

## 2025-07-24 DIAGNOSIS — G43.719 INTRACTABLE CHRONIC MIGRAINE WITHOUT AURA AND WITHOUT STATUS MIGRAINOSUS: ICD-10-CM

## 2025-07-24 PROCEDURE — 73130 X-RAY EXAM OF HAND: CPT | Mod: TC,50,PN

## 2025-07-24 PROCEDURE — 73130 X-RAY EXAM OF HAND: CPT | Mod: 26,,, | Performed by: RADIOLOGY

## 2025-07-24 PROCEDURE — 99999 PR PBB SHADOW E&M-EST. PATIENT-LVL V: CPT | Mod: PBBFAC,,, | Performed by: INTERNAL MEDICINE

## 2025-07-24 RX ORDER — DEXTROAMPHETAMINE SACCHARATE, AMPHETAMINE ASPARTATE, DEXTROAMPHETAMINE SULFATE AND AMPHETAMINE SULFATE 5; 5; 5; 5 MG/1; MG/1; MG/1; MG/1
1 TABLET ORAL 2 TIMES DAILY
Qty: 60 TABLET | Refills: 0 | Status: SHIPPED | OUTPATIENT
Start: 2025-07-24

## 2025-07-24 RX ORDER — PRAVASTATIN SODIUM 40 MG/1
40 TABLET ORAL DAILY
Qty: 90 TABLET | Refills: 2 | Status: SHIPPED | OUTPATIENT
Start: 2025-07-24 | End: 2026-07-24

## 2025-07-24 RX ORDER — DICLOFENAC SODIUM 75 MG/1
75 TABLET, DELAYED RELEASE ORAL 2 TIMES DAILY PRN
Qty: 60 TABLET | Refills: 1 | Status: SHIPPED | OUTPATIENT
Start: 2025-07-24

## 2025-07-24 RX ORDER — MUPIROCIN 20 MG/G
OINTMENT TOPICAL DAILY
Qty: 15 G | Refills: 0 | Status: SHIPPED | OUTPATIENT
Start: 2025-07-24

## 2025-07-24 RX ORDER — BUPROPION HYDROCHLORIDE 150 MG/1
150 TABLET ORAL EVERY MORNING
Qty: 30 TABLET | Refills: 3 | Status: SHIPPED | OUTPATIENT
Start: 2025-07-24 | End: 2026-07-24

## 2025-07-24 RX ORDER — ERGOCALCIFEROL 1.25 MG/1
50000 CAPSULE ORAL
Qty: 12 CAPSULE | Refills: 2 | Status: SHIPPED | OUTPATIENT
Start: 2025-07-24

## 2025-07-24 NOTE — PROGRESS NOTES
"Subjective:       Patient ID: Siobhan Richards is a 54 y.o. female.    Chief Complaint: Follow-up, Shoulder Pain, Sinus Problem (X 3 days - sinus pressure/pain & nose bleeding ), Medication Refill, and Annual Exam   HPI     History of Present Illness            Gyn:  Dr. Camilo /  HRT P cramps, E patch   MM/24   Dexa:  2020 normal   Vit D Def: forget Vit D2 recommend adding OTC daily   Colonoscopy:  Dr Robledo  2024 Adenoma r/c 5 yr               Egd + GERD w esophagitis  Immunizations:  Tdap: check old note Pneumonia:  Shingles: recommend   Smoker:  Former  Get old note    Wellness   Lab review    Mother w early Dementia- stopped Topamax poss SE memory     C/o shoulder pain left xray OA improving     C/o sinus issue congestion pressure maxillary sinus also some light bleeding from right nasal.  Not taking antihistamine does not use nasal sprays   Has never needed cautery for nasal bleeding    C/o B hand pain and wrist pain.  Thumbs B hurt drops things on off.  Left wrist small recurrent cyst        US thyroid: 24" CT small nodules       Migraines:  [facility]  Did not care for Ajvoy injection needle.  Change to Emgality.  Stopped Topamax red side effects possible memory loss, worried with mother history of dementia.         ADD:  FL:  Rx prn Adderall 20 mg work days.  One Rx sent in  Prev Vyvanse prefers immediate release Adderall, Adderall 20 IR     Glucose intolerance:   Improved fasting glucose.  G 95 //    A1C 5.1   Off Metformin QD.               (Highest 5.1 and 107) /   GLP 1 not covered by insurance      Anxiety: cyclic worse recently with mother's progressive memory loss.  Start trial low-dose Wellbutrin 150  Hx of panic attacks, driving anxiety. Rx  prn Buspar.         IBS D/C: urgency/diarrhea:  Cyclic.  Worse post gallbladder surgery.  Trial of Xifaxan GI              : Celiac biopsy negative      Metabolic syndrome: BMI highest 37, BMI 33 & 212, / 207, " 31/199  Prev Adipex and  Topamax 50 .      GERD w small HH:  controlled Rx: Protonix 40      Migraines w/w/o aura: chronic   Rx Emgality, Zomig, Phenergan  Off Topamax 75, Ajovy, Zomig            Mgmt O Neuro NP Elvi Srivastava, NP     Edema lower extremities/venous insufficiency:  Cyclic Rx prn furosemide 20     Mixed HLD/elevated LPA: LDL goal < 70 /  order CT Calcium.  controlled  Rx Pravastatin 40.     Hx Elevated Lpa  45     Allergies:  + tree, grass. Prev Allergy  recommended shots. Deferred rx.  Mgmt Allergy Dr Dominguez   Pnx titers all low.    22' mild low subclass IgG 3, mild elevated IgA       ____________________________________________________________________________________________________  Assessment & Plan:  1. Wellness examination    2. Pain in both hands  - diclofenac (VOLTAREN) 75 MG EC tablet; Take 1 tablet (75 mg total) by mouth 2 (two) times daily as needed (joint pain).  Dispense: 60 tablet; Refill: 1  - X-Ray Hand 3 View Bilateral; Future  - Rheumatoid Factor; Future  - Sedimentation rate; Future  - C-Reactive Protein; Future  - Cyclic Citrullinated Peptide Antibody, IgG; Future    3. Thumb pain, unspecified laterality  - X-Ray Hand 3 View Bilateral; Future  - Rheumatoid Factor; Future  - Sedimentation rate; Future  - C-Reactive Protein; Future  - Cyclic Citrullinated Peptide Antibody, IgG; Future    4. Bleeding nose  - mupirocin (BACTROBAN) 2 % ointment; Apply topically once daily. Nasal bleeding  Dispense: 15 g; Refill: 0    5. Environmental and seasonal allergies    6. Elevated lipoprotein(a)  - pravastatin (PRAVACHOL) 40 MG tablet; Take 1 tablet (40 mg total) by mouth once daily.  Dispense: 90 tablet; Refill: 2    7. Intractable chronic migraine without aura and without status migrainosus    8. Vitamin D deficiency  - ergocalciferol (ERGOCALCIFEROL) 50,000 unit Cap; Take 1 capsule (50,000 Units total) by mouth every 7 days.  Dispense: 12 capsule; Refill: 2    9. Anxiety  - buPROPion  (WELLBUTRIN XL) 150 MG TB24 tablet; Take 1 tablet (150 mg total) by mouth every morning.  Dispense: 30 tablet; Refill: 3    10. ADD (attention deficit disorder) without hyperactivity  - dextroamphetamine-amphetamine (ADDERALL) 20 mg tablet; Take 1 tablet by mouth 2 (two) times a day. 1  Dispense: 60 tablet; Refill: 0    11. Mixed hyperlipidemia  - CT Cardiac Scoring; Future  - pravastatin (PRAVACHOL) 40 MG tablet; Take 1 tablet (40 mg total) by mouth once daily.  Dispense: 90 tablet; Refill: 2    12. Encounter for screening for cardiovascular disorders  - CT Cardiac Scoring; Future    13. IgG3 subclass deficiency    14. Class 1 obesity with serious comorbidity and body mass index (BMI) of 31.0 to 31.9 in adult, unspecified obesity type    15. Gastroesophageal reflux disease with esophagitis, unspecified whether hemorrhage     Wellness examination    Pain in both hands  Comments:  Get x-ray check labs.  Trial anti-inflammatory  Orders:  -     diclofenac (VOLTAREN) 75 MG EC tablet; Take 1 tablet (75 mg total) by mouth 2 (two) times daily as needed (joint pain).  Dispense: 60 tablet; Refill: 1  -     X-Ray Hand 3 View Bilateral; Future; Expected date: 07/24/2025  -     Rheumatoid Factor; Future; Expected date: 07/24/2025  -     Sedimentation rate; Future; Expected date: 07/24/2025  -     C-Reactive Protein; Future; Expected date: 07/24/2025  -     Cyclic Citrullinated Peptide Antibody, IgG; Future; Expected date: 07/24/2025    Thumb pain, unspecified laterality  -     X-Ray Hand 3 View Bilateral; Future; Expected date: 07/24/2025  -     Rheumatoid Factor; Future; Expected date: 07/24/2025  -     Sedimentation rate; Future; Expected date: 07/24/2025  -     C-Reactive Protein; Future; Expected date: 07/24/2025  -     Cyclic Citrullinated Peptide Antibody, IgG; Future; Expected date: 07/24/2025    Bleeding nose  Comments:  Add over-the-counter nasal saline, Zyrtec or Claritin daily.  Continues will have you see  ENT  Orders:  -     mupirocin (BACTROBAN) 2 % ointment; Apply topically once daily. Nasal bleeding  Dispense: 15 g; Refill: 0    Environmental and seasonal allergies    Elevated lipoprotein(a)  Comments:  45  Orders:  -     pravastatin (PRAVACHOL) 40 MG tablet; Take 1 tablet (40 mg total) by mouth once daily.  Dispense: 90 tablet; Refill: 2    Intractable chronic migraine without aura and without status migrainosus    Vitamin D deficiency  -     ergocalciferol (ERGOCALCIFEROL) 50,000 unit Cap; Take 1 capsule (50,000 Units total) by mouth every 7 days.  Dispense: 12 capsule; Refill: 2    Anxiety  -     buPROPion (WELLBUTRIN XL) 150 MG TB24 tablet; Take 1 tablet (150 mg total) by mouth every morning.  Dispense: 30 tablet; Refill: 3    ADD (attention deficit disorder) without hyperactivity  -     dextroamphetamine-amphetamine (ADDERALL) 20 mg tablet; Take 1 tablet by mouth 2 (two) times a day. 1  Dispense: 60 tablet; Refill: 0    Mixed hyperlipidemia  -     CT Cardiac Scoring; Future; Expected date: 07/24/2025  -     pravastatin (PRAVACHOL) 40 MG tablet; Take 1 tablet (40 mg total) by mouth once daily.  Dispense: 90 tablet; Refill: 2    Encounter for screening for cardiovascular disorders  -     CT Cardiac Scoring; Future; Expected date: 07/24/2025    IgG3 subclass deficiency    Class 1 obesity with serious comorbidity and body mass index (BMI) of 31.0 to 31.9 in adult, unspecified obesity type    Gastroesophageal reflux disease with esophagitis, unspecified whether hemorrhage        Continue to work on maintain healthy weight, balanced diet. Avoid unhealthy habits: smoking/vaping, excessive alcohol intake.     Recommend diet exercise:  High protein, low fat, low carb diet - calories women under <1200 & men <1800, carbs <100 G, protein 50-60 G daily. Protein supplements to replace one meal.  Keep all beverages < 10 calories per serving. Keep snacks < 100 calories.   Recommend exercise 160 minutes per week, combo of  cardio and weight/strength training.     Visit today included increased complexity associated with the care of the episodic problem addressed and managing the longitudinal care of the patient due to the serious and/or complex managed problem(s). HTN      Disclaimer: This note was partly generated using dictation software which may occasionally result in transcription errors  ____________________________________________________________________________________________________  Review of Systems:  Review of Systems      Negative     Objective:     Wt Readings from Last 3 Encounters:   07/24/25 90.5 kg (199 lb 6.5 oz)   04/21/25 92.9 kg (204 lb 14.7 oz)   02/06/25 99.8 kg (220 lb 0.3 oz)     BP Readings from Last 3 Encounters:   07/24/25 116/85   04/21/25 110/80   02/06/25 (!) 143/83       Lab Results   Component Value Date    WBC 8.92 07/15/2025    HGB 14.6 07/15/2025    HCT 42.1 07/15/2025     07/15/2025     07/15/2025    K 4.7 07/15/2025     07/15/2025    ALT 14 07/15/2025    AST 14 07/15/2025    CO2 24 07/15/2025    CREATININE 0.84 07/15/2025    BUN 18 07/15/2025    GLU 96 07/15/2025      Hemoglobin A1C   Date Value Ref Range Status   07/15/2025 4.9 4.0 - 5.6 % Final     Comment:     Reference Interval:  5.0 - 5.6 Normal   5.7 - 6.4 High Risk   > 6.5 Diabetic      Hgb A1c results are standardized based on the ([employer]) National   Glycohemoglobin Standardization Program.      Hemoglobin A1C levels are related to mean serum/plasma glucose   during the preceding 2-3 months.        12/28/2023 5.0 0.0 - 5.6 % Final     Comment:     Reference Interval:  5.0 - 5.6 Normal   5.7 - 6.4 High Risk   > 6.5 Diabetic      Hgb A1c results are standardized based on the (NGSP) National   Glycohemoglobin Standardization Program.      Hemoglobin A1C levels are related to mean serum/plasma glucose   during the preceding 2-3 months.        11/05/2022 5.1 0.0 - 5.6 % Final     Comment:     Reference Interval:  5.0 - 5.6  Normal   5.7 - 6.4 High Risk   > 6.5 Diabetic      Hgb A1c results are standardized based on the (NGSP) National   Glycohemoglobin Standardization Program.      Hemoglobin A1C levels are related to mean serum/plasma glucose   during the preceding 2-3 months.           Lab Results   Component Value Date    TSH 2.477 07/15/2025    TSH 1.831 10/30/2024    TSH 1.800 2024     Lab Results   Component Value Date    FREET4 0.96 07/15/2025    FREET4 0.86 10/30/2024    FREET4 0.90 2024     Lab Results   Component Value Date    LDLCALC 97.8 07/15/2025    LDLCALC 123.8 10/30/2024    LDLCALC 162.8 (H) 10/19/2024     Lab Results   Component Value Date    TRIG 86 07/15/2025    TRIG 106 10/30/2024    TRIG 91 10/19/2024            Physical Exam  Constitutional:       Appearance: Normal appearance.   HENT:      Head: Normocephalic and atraumatic.   Eyes:      Extraocular Movements: Extraocular movements intact.      Pupils: Pupils are equal, round, and reactive to light.   Pulmonary:      Effort: Pulmonary effort is normal.   Musculoskeletal:        Hands:    Neurological:      Mental Status: She is alert and oriented to person, place, and time.   Psychiatric:         Mood and Affect: Mood normal.               Medication List with Changes/Refills   New Medications    BUPROPION (WELLBUTRIN XL) 150 MG TB24 TABLET    Take 1 tablet (150 mg total) by mouth every morning.    DICLOFENAC (VOLTAREN) 75 MG EC TABLET    Take 1 tablet (75 mg total) by mouth 2 (two) times daily as needed (joint pain).    MUPIROCIN (BACTROBAN) 2 % OINTMENT    Apply topically once daily. Nasal bleeding   Current Medications    ASPIRIN-ACETAMINOPHEN-CAFFEINE 250-250-65 MG (EXCEDRIN MIGRAINE) 250-250-65 MG PER TABLET        ESTRADIOL (ESTRACE) 0.01 % (0.1 MG/GRAM) VAGINAL CREAM    SMARTSI Gram(s) Vaginal    ESTRADIOL 0.05 MG/24 HR TD PTSW (VIVELLE-DOT) 0.05 MG/24 HR    Place 1 patch onto the skin twice a week.    GALCANEZUMAB-GNLM 120 MG/ML SYRG     Inject 120 mg into the skin every 28 days.    MULTIVITAMIN (DAILY VITAMIN ORAL)        NYSTATIN-TRIAMCINOLONE (MYCOLOG) OINTMENT    Apply topically 2 (two) times daily.    ONDANSETRON (ZOFRAN-ODT) 4 MG TBDL    Take 1 tablet (4 mg total) by mouth every 8 (eight) hours as needed (nausea).    PANTOPRAZOLE (PROTONIX) 40 MG TABLET    Take 40 mg by mouth once daily.    PROGESTERONE (PROMETRIUM) 100 MG CAPSULE    Take 200 mg by mouth. 12 days monthly    PROMETHAZINE (PHENERGAN) 25 MG TABLET    Take 1 tablet (25 mg total) by mouth every 6 (six) hours as needed for Nausea.    YUVAFEM 10 MCG TAB    Place 1 tablet vaginally twice a week.    ZOLMITRIPTAN (ZOMIG) 5 MG SPRY    1 spray in one nostril PRN migraine. May repeat once in 2 hours. No more than 2 days/week.   Changed and/or Refilled Medications    Modified Medication Previous Medication    DEXTROAMPHETAMINE-AMPHETAMINE (ADDERALL) 20 MG TABLET dextroamphetamine-amphetamine (ADDERALL) 20 mg tablet       Take 1 tablet by mouth 2 (two) times a day. 1    Take 1 tablet by mouth 2 (two) times a day. # 2    ERGOCALCIFEROL (ERGOCALCIFEROL) 50,000 UNIT CAP ergocalciferol (ERGOCALCIFEROL) 50,000 unit Cap       Take 1 capsule (50,000 Units total) by mouth every 7 days.    Take 1 capsule (50,000 Units total) by mouth every 7 days.    PRAVASTATIN (PRAVACHOL) 40 MG TABLET pravastatin (PRAVACHOL) 40 MG tablet       Take 1 tablet (40 mg total) by mouth once daily.    Take 1 tablet (40 mg total) by mouth once daily.   Discontinued Medications    ALBUTEROL (VENTOLIN HFA) 90 MCG/ACTUATION INHALER    Inhale 2 puffs into the lungs every 6 (six) hours as needed for Wheezing. Rescue    COLESEVELAM (WELCHOL) 625 MG TABLET    Take 1 tablet (625 mg total) by mouth 2 (two) times daily with meals. Diarrhea    DICYCLOMINE (BENTYL) 10 MG CAPSULE    Take 1 capsule (10 mg total) by mouth 4 (four) times daily. Prn stomach cramps    ESOMEPRAZOLE (NEXIUM) 40 MG CAPSULE    TAKE 1 CAPSULE BY MOUTH BEFORE  BREAKFAST.    FREMANEZUMAB-VFRM (AJOVY AUTOINJECTOR) 225 MG/1.5 ML AUTOINJECTOR    Inject 1.5 mLs (225 mg total) into the skin every 28 days.    FUROSEMIDE (LASIX) 20 MG TABLET        GALCANEZUMAB-GNLM 120 MG/ML PNIJ    Inject 240 mg (2 injections) subcutaneous at separate sites, once (loading dose). Start maintenance dose 28 days later    GALCANEZUMAB-GNLM 120 MG/ML PNIJ    Inject 240 mg (2 injections) subcutaneous at separate sites, once (loading dose). Start maintenance dose 28 days later    LISDEXAMFETAMINE (VYVANSE) 20 MG CAPSULE    Take 1 capsule (20 mg total) by mouth every morning. #2    OXYBUTYNIN (DITROPAN-XL) 5 MG TR24    Take 1 tablet (5 mg total) by mouth once daily. Bladder    PREDNISONE (DELTASONE) 10 MG TABLET    4 tab PO in AM x 2 days, 3 tab in AM x2 days, 2 tab in AM x 2 days, 1 tab in AM x 2 days then stop    TIRZEPATIDE, WEIGHT LOSS, (ZEPBOUND) 2.5 MG/0.5 ML PNIJ    Inject 2.5 mg into the skin every 7 days.    TOPIRAMATE (TOPAMAX) 25 MG TABLET    Take with 50 mg twice daily    TOPIRAMATE (TOPAMAX) 50 MG TABLET    TAKE 1 TABLET BY MOUTH EVERY DAY

## 2025-07-29 ENCOUNTER — HOSPITAL ENCOUNTER (OUTPATIENT)
Dept: RADIOLOGY | Facility: HOSPITAL | Age: 55
Discharge: HOME | End: 2025-07-29
Attending: INTERNAL MEDICINE
Payer: COMMERCIAL

## 2025-07-29 ENCOUNTER — RESULTS FOLLOW-UP (OUTPATIENT)
Dept: FAMILY MEDICINE | Facility: CLINIC | Age: 55
End: 2025-07-29
Payer: COMMERCIAL

## 2025-07-29 DIAGNOSIS — E78.2 MIXED HYPERLIPIDEMIA: ICD-10-CM

## 2025-07-29 DIAGNOSIS — I25.10 CORONARY ATHEROSCLEROSIS DUE TO CALCIFIED CORONARY LESION: Primary | ICD-10-CM

## 2025-07-29 DIAGNOSIS — R16.1 SPLEEN ENLARGED: ICD-10-CM

## 2025-07-29 DIAGNOSIS — R93.1 AGATSTON CORONARY ARTERY CALCIUM SCORE LESS THAN 100: ICD-10-CM

## 2025-07-29 DIAGNOSIS — I25.84 CORONARY ATHEROSCLEROSIS DUE TO CALCIFIED CORONARY LESION: Primary | ICD-10-CM

## 2025-07-29 DIAGNOSIS — Z13.6 ENCOUNTER FOR SCREENING FOR CARDIOVASCULAR DISORDERS: ICD-10-CM

## 2025-07-29 PROCEDURE — 75571 CT HRT W/O DYE W/CA TEST: CPT | Mod: 26,,, | Performed by: STUDENT IN AN ORGANIZED HEALTH CARE EDUCATION/TRAINING PROGRAM

## 2025-07-29 PROCEDURE — 75571 CT HRT W/O DYE W/CA TEST: CPT | Mod: TC,PO

## 2025-08-01 PROBLEM — R93.1 AGATSTON CORONARY ARTERY CALCIUM SCORE LESS THAN 100: Status: ACTIVE | Noted: 2025-08-01

## 2025-08-06 ENCOUNTER — HOSPITAL ENCOUNTER (OUTPATIENT)
Dept: RADIOLOGY | Facility: HOSPITAL | Age: 55
Discharge: HOME | End: 2025-08-06
Attending: INTERNAL MEDICINE
Payer: COMMERCIAL

## 2025-08-06 DIAGNOSIS — R16.1 SPLEEN ENLARGED: ICD-10-CM

## 2025-08-06 PROCEDURE — 76700 US EXAM ABDOM COMPLETE: CPT | Mod: 26,,, | Performed by: STUDENT IN AN ORGANIZED HEALTH CARE EDUCATION/TRAINING PROGRAM

## 2025-08-06 PROCEDURE — 76700 US EXAM ABDOM COMPLETE: CPT | Mod: TC,PO

## 2025-08-13 ENCOUNTER — OFFICE VISIT (OUTPATIENT)
Dept: NEUROLOGY | Facility: CLINIC | Age: 55
End: 2025-08-13
Payer: COMMERCIAL

## 2025-08-13 VITALS
RESPIRATION RATE: 18 BRPM | SYSTOLIC BLOOD PRESSURE: 122 MMHG | WEIGHT: 203.81 LBS | BODY MASS INDEX: 31.99 KG/M2 | HEART RATE: 75 BPM | TEMPERATURE: 99 F | HEIGHT: 67 IN | DIASTOLIC BLOOD PRESSURE: 78 MMHG

## 2025-08-13 DIAGNOSIS — G43.719 INTRACTABLE CHRONIC MIGRAINE WITHOUT AURA AND WITHOUT STATUS MIGRAINOSUS: Primary | ICD-10-CM

## 2025-08-13 PROCEDURE — 99213 OFFICE O/P EST LOW 20 MIN: CPT | Mod: S$GLB,,, | Performed by: NURSE PRACTITIONER

## 2025-08-13 PROCEDURE — 99999 PR PBB SHADOW E&M-EST. PATIENT-LVL IV: CPT | Mod: PBBFAC,,, | Performed by: NURSE PRACTITIONER

## 2025-08-13 PROCEDURE — 3078F DIAST BP <80 MM HG: CPT | Mod: CPTII,S$GLB,, | Performed by: NURSE PRACTITIONER

## 2025-08-13 PROCEDURE — 3044F HG A1C LEVEL LT 7.0%: CPT | Mod: CPTII,S$GLB,, | Performed by: NURSE PRACTITIONER

## 2025-08-13 PROCEDURE — 1159F MED LIST DOCD IN RCRD: CPT | Mod: CPTII,S$GLB,, | Performed by: NURSE PRACTITIONER

## 2025-08-13 PROCEDURE — 3008F BODY MASS INDEX DOCD: CPT | Mod: CPTII,S$GLB,, | Performed by: NURSE PRACTITIONER

## 2025-08-13 PROCEDURE — 3074F SYST BP LT 130 MM HG: CPT | Mod: CPTII,S$GLB,, | Performed by: NURSE PRACTITIONER

## 2025-08-13 RX ORDER — RIMEGEPANT SULFATE 75 MG/75MG
75 TABLET, ORALLY DISINTEGRATING ORAL
Qty: 8 TABLET | Refills: 11 | Status: SHIPPED | OUTPATIENT
Start: 2025-08-13

## 2025-08-23 ENCOUNTER — PATIENT MESSAGE (OUTPATIENT)
Dept: NEUROLOGY | Facility: CLINIC | Age: 55
End: 2025-08-23
Payer: COMMERCIAL

## 2025-09-02 ENCOUNTER — TELEPHONE (OUTPATIENT)
Dept: NEUROLOGY | Facility: CLINIC | Age: 55
End: 2025-09-02
Payer: COMMERCIAL

## 2025-09-03 ENCOUNTER — PROCEDURE VISIT (OUTPATIENT)
Dept: NEUROLOGY | Facility: CLINIC | Age: 55
End: 2025-09-03
Payer: COMMERCIAL

## 2025-09-03 VITALS
DIASTOLIC BLOOD PRESSURE: 82 MMHG | SYSTOLIC BLOOD PRESSURE: 135 MMHG | HEART RATE: 71 BPM | WEIGHT: 203.69 LBS | BODY MASS INDEX: 31.97 KG/M2 | TEMPERATURE: 98 F | HEIGHT: 67 IN | RESPIRATION RATE: 18 BRPM

## 2025-09-03 DIAGNOSIS — G43.719 INTRACTABLE CHRONIC MIGRAINE WITHOUT AURA AND WITHOUT STATUS MIGRAINOSUS: Primary | ICD-10-CM
